# Patient Record
Sex: FEMALE | Race: WHITE | NOT HISPANIC OR LATINO | Employment: OTHER | URBAN - METROPOLITAN AREA
[De-identification: names, ages, dates, MRNs, and addresses within clinical notes are randomized per-mention and may not be internally consistent; named-entity substitution may affect disease eponyms.]

---

## 2023-02-24 LAB
NATRIURETIC PEPTIDE B (PG/ML) IN SER/PLAS: 140 PG/ML (ref 0–99)
THYROTROPIN (MIU/L) IN SER/PLAS BY DETECTION LIMIT <= 0.05 MIU/L: 1.47 MIU/L (ref 0.44–3.98)

## 2023-04-01 DIAGNOSIS — D64.9 ANEMIA, UNSPECIFIED: ICD-10-CM

## 2023-04-01 DIAGNOSIS — E11.49 TYPE 2 DIABETES MELLITUS WITH OTHER DIABETIC NEUROLOGICAL COMPLICATION (MULTI): ICD-10-CM

## 2023-04-01 DIAGNOSIS — I63.9 CEREBRAL INFARCTION, UNSPECIFIED (MULTI): ICD-10-CM

## 2023-04-01 DIAGNOSIS — E03.9 HYPOTHYROIDISM, UNSPECIFIED: ICD-10-CM

## 2023-04-01 DIAGNOSIS — I10 ESSENTIAL (PRIMARY) HYPERTENSION: ICD-10-CM

## 2023-04-01 DIAGNOSIS — E78.5 HYPERLIPIDEMIA, UNSPECIFIED: ICD-10-CM

## 2023-04-04 RX ORDER — LOSARTAN POTASSIUM 25 MG/1
TABLET ORAL
Qty: 30 TABLET | Refills: 1 | Status: SHIPPED | OUTPATIENT
Start: 2023-04-04 | End: 2023-04-24

## 2023-04-04 RX ORDER — ATORVASTATIN CALCIUM 80 MG/1
TABLET, FILM COATED ORAL
Qty: 90 TABLET | Refills: 0 | Status: SHIPPED | OUTPATIENT
Start: 2023-04-04 | End: 2024-01-09 | Stop reason: SDUPTHER

## 2023-04-04 RX ORDER — LEVOTHYROXINE SODIUM 150 UG/1
TABLET ORAL
Qty: 30 TABLET | Refills: 1 | Status: SHIPPED | OUTPATIENT
Start: 2023-04-04 | End: 2023-05-01

## 2023-04-04 RX ORDER — LEVOTHYROXINE SODIUM 137 UG/1
TABLET ORAL
Qty: 30 TABLET | Refills: 1 | OUTPATIENT
Start: 2023-04-04

## 2023-04-04 RX ORDER — GLIPIZIDE 5 MG/1
TABLET ORAL
Qty: 60 TABLET | Refills: 1 | Status: SHIPPED | OUTPATIENT
Start: 2023-04-04 | End: 2023-12-22 | Stop reason: SDUPTHER

## 2023-04-04 RX ORDER — FERROUS SULFATE TAB 325 MG (65 MG ELEMENTAL FE) 325 (65 FE) MG
TAB ORAL
Qty: 90 TABLET | Refills: 0 | Status: SHIPPED | OUTPATIENT
Start: 2023-04-04 | End: 2023-07-27 | Stop reason: SDUPTHER

## 2023-04-04 RX ORDER — CLOPIDOGREL BISULFATE 75 MG/1
TABLET ORAL
Qty: 90 TABLET | Refills: 0 | Status: SHIPPED | OUTPATIENT
Start: 2023-04-04 | End: 2023-04-24

## 2023-04-14 ENCOUNTER — PATIENT OUTREACH (OUTPATIENT)
Dept: CARE COORDINATION | Facility: CLINIC | Age: 88
End: 2023-04-14
Payer: MEDICARE

## 2023-04-21 ENCOUNTER — APPOINTMENT (OUTPATIENT)
Dept: PRIMARY CARE | Facility: CLINIC | Age: 88
End: 2023-04-21
Payer: MEDICARE

## 2023-04-26 ENCOUNTER — OFFICE VISIT (OUTPATIENT)
Dept: PRIMARY CARE | Facility: CLINIC | Age: 88
End: 2023-04-26
Payer: MEDICARE

## 2023-04-26 VITALS
BODY MASS INDEX: 23.08 KG/M2 | HEART RATE: 82 BPM | DIASTOLIC BLOOD PRESSURE: 71 MMHG | OXYGEN SATURATION: 96 % | WEIGHT: 135.2 LBS | SYSTOLIC BLOOD PRESSURE: 156 MMHG | HEIGHT: 64 IN

## 2023-04-26 DIAGNOSIS — R21 RASH: ICD-10-CM

## 2023-04-26 DIAGNOSIS — N18.32 STAGE 3B CHRONIC KIDNEY DISEASE (MULTI): ICD-10-CM

## 2023-04-26 DIAGNOSIS — R20.3 INCREASED SKIN SENSATION: ICD-10-CM

## 2023-04-26 DIAGNOSIS — R93.1 ABNORMAL ECHOCARDIOGRAM: ICD-10-CM

## 2023-04-26 DIAGNOSIS — E78.5 DYSLIPIDEMIA: ICD-10-CM

## 2023-04-26 DIAGNOSIS — G44.89 OTHER HEADACHE SYNDROME: ICD-10-CM

## 2023-04-26 DIAGNOSIS — D50.8 OTHER IRON DEFICIENCY ANEMIA: ICD-10-CM

## 2023-04-26 DIAGNOSIS — F32.0 CURRENT MILD EPISODE OF MAJOR DEPRESSIVE DISORDER WITHOUT PRIOR EPISODE (CMS-HCC): ICD-10-CM

## 2023-04-26 DIAGNOSIS — M31.6 TEMPORAL ARTERITIS (MULTI): ICD-10-CM

## 2023-04-26 DIAGNOSIS — I50.89 OTHER HEART FAILURE (MULTI): ICD-10-CM

## 2023-04-26 DIAGNOSIS — R13.12 OROPHARYNGEAL DYSPHAGIA: ICD-10-CM

## 2023-04-26 DIAGNOSIS — J98.4 PNEUMONITIS: Primary | ICD-10-CM

## 2023-04-26 DIAGNOSIS — E89.0 POSTABLATIVE HYPOTHYROIDISM: ICD-10-CM

## 2023-04-26 DIAGNOSIS — I10 BENIGN ESSENTIAL HTN: ICD-10-CM

## 2023-04-26 PROBLEM — R41.82 ACUTE ALTERATION IN MENTAL STATUS: Status: ACTIVE | Noted: 2023-04-26

## 2023-04-26 PROBLEM — H91.90 HARD OF HEARING: Status: ACTIVE | Noted: 2023-04-26

## 2023-04-26 PROBLEM — N39.0 ACUTE LOWER UTI: Status: ACTIVE | Noted: 2023-04-26

## 2023-04-26 PROBLEM — G89.29 CHRONIC PAIN OF BOTH KNEES: Status: ACTIVE | Noted: 2023-04-26

## 2023-04-26 PROBLEM — E16.2 HYPOGLYCEMIA: Status: ACTIVE | Noted: 2023-04-26

## 2023-04-26 PROBLEM — L30.9 ECZEMA: Status: ACTIVE | Noted: 2023-04-26

## 2023-04-26 PROBLEM — I50.9 HEART FAILURE (MULTI): Status: ACTIVE | Noted: 2023-04-26

## 2023-04-26 PROBLEM — M10.9 GOUT FLARE: Status: ACTIVE | Noted: 2023-04-26

## 2023-04-26 PROBLEM — M25.571 CHRONIC PAIN OF RIGHT ANKLE: Status: ACTIVE | Noted: 2023-04-26

## 2023-04-26 PROBLEM — M25.562 CHRONIC PAIN OF BOTH KNEES: Status: ACTIVE | Noted: 2023-04-26

## 2023-04-26 PROBLEM — E78.1 HYPERTRIGLYCERIDEMIA: Status: ACTIVE | Noted: 2023-04-26

## 2023-04-26 PROBLEM — R44.3 HALLUCINATION: Status: ACTIVE | Noted: 2023-04-26

## 2023-04-26 PROBLEM — I63.9 CVA (CEREBRAL VASCULAR ACCIDENT) (MULTI): Status: ACTIVE | Noted: 2023-04-26

## 2023-04-26 PROBLEM — R53.82 CHRONIC FATIGUE: Status: ACTIVE | Noted: 2023-04-26

## 2023-04-26 PROBLEM — S89.92XA: Status: ACTIVE | Noted: 2023-04-26

## 2023-04-26 PROBLEM — R29.6 FREQUENT FALLS: Status: ACTIVE | Noted: 2023-04-26

## 2023-04-26 PROBLEM — M19.90 ARTHRITIS: Status: ACTIVE | Noted: 2023-04-26

## 2023-04-26 PROBLEM — D64.9 ANEMIA: Status: ACTIVE | Noted: 2023-04-26

## 2023-04-26 PROBLEM — M25.561 CHRONIC PAIN OF BOTH KNEES: Status: ACTIVE | Noted: 2023-04-26

## 2023-04-26 PROBLEM — G89.29 CHRONIC PAIN OF RIGHT ANKLE: Status: ACTIVE | Noted: 2023-04-26

## 2023-04-26 PROBLEM — R05.9 COUGH: Status: ACTIVE | Noted: 2023-04-26

## 2023-04-26 PROBLEM — I25.10 ATHEROSCLEROSIS OF NATIVE CORONARY ARTERY OF NATIVE HEART WITHOUT ANGINA PECTORIS: Status: ACTIVE | Noted: 2023-04-26

## 2023-04-26 PROBLEM — E03.9 HYPOTHYROIDISM: Status: ACTIVE | Noted: 2023-04-26

## 2023-04-26 PROCEDURE — 3078F DIAST BP <80 MM HG: CPT | Performed by: FAMILY MEDICINE

## 2023-04-26 PROCEDURE — 3077F SYST BP >= 140 MM HG: CPT | Performed by: FAMILY MEDICINE

## 2023-04-26 PROCEDURE — 1160F RVW MEDS BY RX/DR IN RCRD: CPT | Performed by: FAMILY MEDICINE

## 2023-04-26 PROCEDURE — 1036F TOBACCO NON-USER: CPT | Performed by: FAMILY MEDICINE

## 2023-04-26 PROCEDURE — 1159F MED LIST DOCD IN RCRD: CPT | Performed by: FAMILY MEDICINE

## 2023-04-26 PROCEDURE — 99214 OFFICE O/P EST MOD 30 MIN: CPT | Performed by: FAMILY MEDICINE

## 2023-04-26 RX ORDER — FUROSEMIDE 20 MG/1
20 TABLET ORAL DAILY
COMMUNITY
End: 2023-06-02 | Stop reason: ALTCHOICE

## 2023-04-26 RX ORDER — TRIAMCINOLONE ACETONIDE 1 MG/G
OINTMENT TOPICAL
Qty: 60 G | Refills: 0 | Status: SHIPPED | OUTPATIENT
Start: 2023-04-26 | End: 2024-02-12 | Stop reason: WASHOUT

## 2023-04-26 RX ORDER — INSULIN DEGLUDEC 100 U/ML
16 INJECTION, SOLUTION SUBCUTANEOUS EVERY MORNING
COMMUNITY
Start: 2017-12-04 | End: 2023-11-27 | Stop reason: SDUPTHER

## 2023-04-26 RX ORDER — ERGOCALCIFEROL 1.25 MG/1
1 CAPSULE ORAL
COMMUNITY
End: 2023-06-02 | Stop reason: ALTCHOICE

## 2023-04-26 RX ORDER — MAGNESIUM HYDROXIDE 400 MG/5ML
1 SUSPENSION, ORAL (FINAL DOSE FORM) ORAL DAILY
COMMUNITY
End: 2023-06-02 | Stop reason: ALTCHOICE

## 2023-04-26 RX ORDER — LANOLIN ALCOHOL/MO/W.PET/CERES
1 CREAM (GRAM) TOPICAL DAILY
COMMUNITY
Start: 2021-03-07 | End: 2023-10-27 | Stop reason: SDUPTHER

## 2023-04-26 RX ORDER — METOPROLOL TARTRATE 25 MG/1
1 TABLET, FILM COATED ORAL 2 TIMES DAILY
COMMUNITY
Start: 2019-04-04 | End: 2023-05-19

## 2023-04-26 ASSESSMENT — PAIN SCALES - GENERAL: PAINLEVEL: 0-NO PAIN

## 2023-04-26 ASSESSMENT — ENCOUNTER SYMPTOMS
LOSS OF SENSATION IN FEET: 0
DEPRESSION: 0
OCCASIONAL FEELINGS OF UNSTEADINESS: 0

## 2023-04-26 NOTE — PROGRESS NOTES
Ashlee Douglas A Saturday is a 87 y.o. female who presents for No chief complaint on file..          HPI  The patient is a 87 year-old female presenting to the clinic after being discharged from a Rehabilitation facility.  Recently she was still in the hospital.  Was discharged home from rehabilitation facility  Blood sugars are running normal.  Recent blood sugar was 118.  Complaining hot sensation in the scalp on and off and happening for more than 6 months.  Also feels like she is having headache.  Denies tingling numbness.  Denies tingling weakness.  Treated in the hospital for pneumonitis.  History of congestive heart failure.  Denies PND.  Denies orthopnea.  Educated on chronic kidney disease.  We will continue monitor.  Has been having trouble swallowing on and off discussed echocardiogram results.  Refer to cardiology.  Educated on hypertension and low-salt and exercise.  Educated on hypothyroidism.  Educated on anemia.  Denies blood in the stool.  History of depression.  Denies depression.  Denies anxiety.  Denies suicidal.  Denies homicidal.  Educated on dyslipidemia and diet and exercise.    Was treated for temporal arteritis in the past.  Patient stated that she had been to the neurologist.              Review of Systems  Review of systems    General.  Denies fever.  Denies chills.    HEENT detail as above     respiratory.  Denies cough.  Denies shortness of breath.    Cardiovascular.  Denies chest pain.  Denies heart palpitations.  Denies shortness of breath.    Gastrointestinal.  Denies nausea vomiting diarrhea.  Denies abdominal pain.    Genitourinary denies burning urination.  Denies frequent urination.  Denies flank pain.  Denies blood in the urine.  Denies abnormal vaginal discharge.    Neurology.  Dictated as above  Musculoskeletal.  Denies body aches.  Denies joint pains.  Denies muscle aches.  Denies muscle weakness    Endocrinology.  Denies cold intolerance.  Denies hot  "intolerance.    Psychiatric.  Denies depression.  Denies anxiety.  Denies suicidal.  Denies homicidal.  Skin.  Dictated as above.  Objective       4/26/2023 11:19 AM   /71   BP Location Left arm   Patient Position Sitting   BP Cuff Size Adult   Pulse 82   SpO2 96 %   Weight 61.3 kg (135 lb 3.2 oz)   Height 1.626 m (5' 4\")   Pain Score 0-No pain    Other Vitals   BMI 23.21 kg/m2   BSA 1.66 m2   Menstrual status Postmenopausal   OB/Gyn status reviewed 4/26/2023   Tobacco   Smoking status Never   Counseling given? No   Smokeless status Never   Reviewed 4/26/2023          Physical Exam  General.  Not in distress.  HEENT normocephalic anicteric sclerae.  Neck soft supple no thyromegaly.  No carotid bruit.  Lungs are clear.  Heart regular.  Abdomen soft nontender nondistended bowel sounds are positive.  Extremities no clubbing cyanosis or edema.  Psychiatric.  Has good eye contact.  No crying spells noted.  Speech was normal.  Denies depression.  Denies suicidal.  Denies homicidal.  Foot exam.  Bilateral foot exam.  No rashes noted.  No lesions noted.  No ulcers noted.  No onychomycosis noted.  Sensation was intact with a monofilament.  Bilateral posterior tibial and dorsalis pedis arteries are palpable  Skin exam.  Skin rash on upper and lower extremities and on the trunk consistent with a dermatitis    Assessment/Plan   1.  Pneumonitis.  Lungs are clear.  Was recently treated in the hospital.  Denies shortness of breath.    2.  Chronic kidney disease.  Dictated as above    3.  Temporal arteritis.  Patient stated she had been to the specialist and she was treated by specialist.    Oropharyngeal dysphagia.  Recommended to the ENT.    5.  Abnormal echocardiogram.  Dictated as above    6.  Hypertension.  Educated on low-salt and exercise.  Continue current management.    7.  Hypothyroidism.  Educated on hypothyroidism.    8.  Iron deficiency anemia.  Dictated as above.    9.  Depression.  Denies anxiety.  Denies " suicidal.  Denies homicidal.    10.  Skin rash.  Educated on skin rash care.    11.  Headache.  Neurological exam was normal.                          4.      3.  Heart failure.  Dictated as above.    4.          Problem List Items Addressed This Visit    None  Scribe Attestation  By signing my name below, IGeovanna Scribe   attest that this documentation has been prepared under the direction and in the presence of Gamaliel Wallace MD.

## 2023-05-19 DIAGNOSIS — I25.10 ATHEROSCLEROTIC HEART DISEASE OF NATIVE CORONARY ARTERY WITHOUT ANGINA PECTORIS: ICD-10-CM

## 2023-05-19 RX ORDER — METOPROLOL TARTRATE 25 MG/1
TABLET, FILM COATED ORAL
Qty: 60 TABLET | Refills: 1 | Status: SHIPPED | OUTPATIENT
Start: 2023-05-19 | End: 2023-07-27 | Stop reason: SDUPTHER

## 2023-06-02 ENCOUNTER — OFFICE VISIT (OUTPATIENT)
Dept: PRIMARY CARE | Facility: CLINIC | Age: 88
End: 2023-06-02
Payer: MEDICARE

## 2023-06-02 VITALS
HEIGHT: 64 IN | HEART RATE: 60 BPM | WEIGHT: 131.5 LBS | OXYGEN SATURATION: 99 % | SYSTOLIC BLOOD PRESSURE: 153 MMHG | BODY MASS INDEX: 22.45 KG/M2 | DIASTOLIC BLOOD PRESSURE: 70 MMHG

## 2023-06-02 DIAGNOSIS — I63.89 CEREBROVASCULAR ACCIDENT (CVA) DUE TO OTHER MECHANISM (MULTI): ICD-10-CM

## 2023-06-02 DIAGNOSIS — N18.32 STAGE 3B CHRONIC KIDNEY DISEASE (MULTI): ICD-10-CM

## 2023-06-02 DIAGNOSIS — E87.6 HYPOKALEMIA: ICD-10-CM

## 2023-06-02 DIAGNOSIS — R60.0 LOCALIZED EDEMA: ICD-10-CM

## 2023-06-02 DIAGNOSIS — Z79.4 TYPE 2 DIABETES MELLITUS WITH OTHER SPECIFIED COMPLICATION, WITH LONG-TERM CURRENT USE OF INSULIN (MULTI): ICD-10-CM

## 2023-06-02 DIAGNOSIS — E11.69 TYPE 2 DIABETES MELLITUS WITH OTHER SPECIFIED COMPLICATION, WITH LONG-TERM CURRENT USE OF INSULIN (MULTI): ICD-10-CM

## 2023-06-02 DIAGNOSIS — E78.5 DYSLIPIDEMIA: ICD-10-CM

## 2023-06-02 DIAGNOSIS — E55.9 VITAMIN D DEFICIENCY: ICD-10-CM

## 2023-06-02 DIAGNOSIS — G44.89 OTHER HEADACHE SYNDROME: Primary | ICD-10-CM

## 2023-06-02 PROCEDURE — 1159F MED LIST DOCD IN RCRD: CPT | Performed by: FAMILY MEDICINE

## 2023-06-02 PROCEDURE — 99214 OFFICE O/P EST MOD 30 MIN: CPT | Performed by: FAMILY MEDICINE

## 2023-06-02 PROCEDURE — 3077F SYST BP >= 140 MM HG: CPT | Performed by: FAMILY MEDICINE

## 2023-06-02 PROCEDURE — 1036F TOBACCO NON-USER: CPT | Performed by: FAMILY MEDICINE

## 2023-06-02 PROCEDURE — 1160F RVW MEDS BY RX/DR IN RCRD: CPT | Performed by: FAMILY MEDICINE

## 2023-06-02 PROCEDURE — 3078F DIAST BP <80 MM HG: CPT | Performed by: FAMILY MEDICINE

## 2023-06-02 RX ORDER — ERGOCALCIFEROL 1.25 MG/1
1 CAPSULE ORAL
Qty: 12 CAPSULE | Refills: 0 | Status: CANCELLED | OUTPATIENT
Start: 2023-06-02

## 2023-06-02 RX ORDER — POTASSIUM CHLORIDE 20 MEQ/1
20 TABLET, EXTENDED RELEASE ORAL DAILY
Qty: 30 TABLET | Refills: 0 | Status: SHIPPED | OUTPATIENT
Start: 2023-06-02 | End: 2023-07-02

## 2023-06-02 RX ORDER — FUROSEMIDE 20 MG/1
20 TABLET ORAL DAILY
Qty: 90 TABLET | Refills: 1 | Status: CANCELLED | OUTPATIENT
Start: 2023-06-02

## 2023-06-02 ASSESSMENT — PAIN SCALES - GENERAL: PAINLEVEL: 0-NO PAIN

## 2023-06-02 NOTE — PROGRESS NOTES
Ashlee Douglas A Saturday is a 87 y.o. female who presents for Follow-up (Follow up meds/lab results).  Today she is accompanied by her son.      HPI  The patient is a 87 year-old female presenting to the clinic for follow up        Discussed diabetes and management.  Referral placed with Neurology 6/2/2023.  Take potassium as directed  Follow up in office.   Comes with her nephew.  I have reviewed MRI results.  IMPRESSION:  No acute intracranial abnormality was identified with nonspecific  white matter abnormalities likely due to chronic small vessel  ischemic changes given the age of the patient, multiple foci of old  ischemia most prominently involving the left cerebellar hemisphere  and thalami. Few nonspecific foci of cortical increased  susceptibility some of which may be due to remote injury, scattered  cavernomas, or in the appropriate clinical context minimal infectious  inflammatory change for instance.    Still experiencing headache.  Denies tingling numbness.  Denies weakness.  Recommended neurology consultation.  Advised to go to the emergency room if headache continues or gets worse.  Educated on edema.  Denies PND.  Denies orthopnea.  Educated on vitamin D deficiency.  Discussed chronic kidney disease.  Does not wish to go see the nephrologist but does not wish to go for 24-hour creatinine clearance.  We will continue monitor.  Educated on hypokalemia.  Has history of CVA.  Had been to the neurologist in the past.  Educated on dyslipidemia and diet and exercise but educated on type 2 diabetes and diet exercise.              Review of Systems  Review of systems:    General:  Denies fever.  Denies chills.    HEENT: Denies nasal congestion.  Denies sinus pressure.    Respiratory:  Denies cough.  Denies shortness of breath.    Cardiovascular:  Denies chest pain.  Denies heart palpitations.  Denies shortness of breath.    Gastrointestinal:  Denies nausea vomiting diarrhea.  Denies abdominal  "pain.    Genitourinary: Denies burning urination.  Denies frequent urination.  Denies flank pain.  Denies blood in the urine.  Denies abnormal vaginal discharge.    Neurology:  Denies tingling numbness but denies weakness.  Denies headache.  Denies blurred vision.    Musculoskeletal:  Denies body aches.  Denies joint pains.  Denies muscle aches.  Denies muscle weakness    Endocrinology:  Dictated as above.    Psychiatric:  Denies depression.  Denies anxiety.  Denies suicidal.  Denies homicidal.      Objective   /70 (BP Location: Left arm, Patient Position: Sitting, BP Cuff Size: Adult)   Pulse 60   Ht 1.626 m (5' 4\")   Wt 59.6 kg (131 lb 8 oz)   SpO2 99%   BMI 22.57 kg/m²           Physical Exam  General.  Not in distress.  HEENT normocephalic anicteric sclerae.  Neck soft supple no thyromegaly.  No carotid bruit.  Lungs are clear.  Heart regular.  Abdomen soft nontender nondistended bowel sounds are positive.  Extremities no clubbing cyanosis or edema.  Psychiatric.  Has good eye contact.  No crying spells noted.  Speech was normal.  Denies depression.  Denies suicidal.  Denies homicidal.  Neurological exam was normal.  Foot exam.  Bilateral foot exam.  No rashes noted.  No lesions noted.  No ulcers noted.  No onychomycosis noted.  Sensation was intact with a monofilament.  Bilateral posterior tibial and dorsalis pedis arteries are palpable    Assessment/Plan     1.  Headache.  Neurological exam was normal.  Reviewed MRI results.  Keep appointment with the neurologist.  2.  Edema.  Denies PND.  Denies orthopnea.  Continue current management.    3.  Vitamin D deficiency.  Educated on vitamin D deficiency we will continue monitor.    4.  Chronic kidney disease.  Dictated as above.    5.  Hypokalemia.  Educated on hypokalemia.  Advised to increase dietary potassium intake.    6.  CVA.  Dictated as above.    7.  Dyslipidemia.  Educated on diet exercise.  We will continue monitor    8.  Type 2 diabetes.  " Educated on diet and exercise.  Advised to check blood sugars twice daily.  Recommended eye exam once a year.  Educated on diabetic foot care.  We will continue monitor.                                  Problem List Items Addressed This Visit          Nervous    CVA (cerebral vascular accident) (CMS/[facility])       Genitourinary    Stage 3b chronic kidney disease       Other    Dyslipidemia     Other Visit Diagnoses       Other headache syndrome    -  Primary    Relevant Orders    Referral to Neurology    Vitamin D deficiency        Localized edema        Hypokalemia        Relevant Medications    potassium chloride CR (Klor-Con M20) 20 mEq ER tablet    Other Relevant Orders    Comprehensive metabolic panel    Type 2 diabetes mellitus with other specified complication, with long-term current use of insulin (CMS/[facility])            Scribe Attestation  By signing my name below, IGeovanna Scribe   attest that this documentation has been prepared under the direction and in the presence of Gamaliel Wallace MD.

## 2023-07-27 DIAGNOSIS — D64.9 ANEMIA, UNSPECIFIED: ICD-10-CM

## 2023-07-27 DIAGNOSIS — E03.9 HYPOTHYROIDISM, UNSPECIFIED: ICD-10-CM

## 2023-07-27 DIAGNOSIS — E87.6 HYPOKALEMIA: ICD-10-CM

## 2023-07-27 DIAGNOSIS — I25.10 ATHEROSCLEROTIC HEART DISEASE OF NATIVE CORONARY ARTERY WITHOUT ANGINA PECTORIS: ICD-10-CM

## 2023-07-27 RX ORDER — FERROUS SULFATE 325(65) MG
1 TABLET ORAL DAILY
Qty: 90 TABLET | Refills: 0 | Status: SHIPPED | OUTPATIENT
Start: 2023-07-27 | End: 2024-02-12 | Stop reason: SDUPTHER

## 2023-07-27 RX ORDER — METOPROLOL TARTRATE 25 MG/1
25 TABLET, FILM COATED ORAL 2 TIMES DAILY
Qty: 180 TABLET | Refills: 0 | Status: SHIPPED | OUTPATIENT
Start: 2023-07-27 | End: 2024-01-09 | Stop reason: SDUPTHER

## 2023-07-27 RX ORDER — POTASSIUM CHLORIDE 1500 MG/1
20 TABLET, EXTENDED RELEASE ORAL DAILY
Qty: 90 TABLET | Refills: 0 | Status: SHIPPED | OUTPATIENT
Start: 2023-07-27 | End: 2023-12-11 | Stop reason: SDUPTHER

## 2023-07-27 RX ORDER — LEVOTHYROXINE SODIUM 150 UG/1
150 TABLET ORAL DAILY
Qty: 90 TABLET | Refills: 0 | Status: SHIPPED | OUTPATIENT
Start: 2023-07-27 | End: 2023-12-07 | Stop reason: SDUPTHER

## 2023-07-27 NOTE — TELEPHONE ENCOUNTER
Apt with you on 10-9, pot was 3.3 on 4-12, TSH was 0.05 on 4-6, RBC was 3.17, hemo 9.0 and hemat 28.9 on 4-5, requesting the potassium be refilled it looks like this pt was receiving this from dr. GRACE johnson of hypokalemia, pharm confirms pt has been receiving, all changes have been made.

## 2023-08-17 ENCOUNTER — NURSING HOME VISIT (OUTPATIENT)
Dept: POST ACUTE CARE | Facility: EXTERNAL LOCATION | Age: 88
End: 2023-08-17
Payer: MEDICARE

## 2023-08-17 DIAGNOSIS — E03.9 HYPOTHYROIDISM, UNSPECIFIED TYPE: ICD-10-CM

## 2023-08-17 DIAGNOSIS — E87.6 HYPOKALEMIA: ICD-10-CM

## 2023-08-17 DIAGNOSIS — I21.4 NSTEMI (NON-ST ELEVATION MYOCARDIAL INFARCTION) (MULTI): ICD-10-CM

## 2023-08-17 DIAGNOSIS — I63.9 CEREBROVASCULAR ACCIDENT (CVA), UNSPECIFIED MECHANISM (MULTI): ICD-10-CM

## 2023-08-17 DIAGNOSIS — E78.5 HYPERLIPIDEMIA, UNSPECIFIED HYPERLIPIDEMIA TYPE: ICD-10-CM

## 2023-08-17 DIAGNOSIS — D50.8 OTHER IRON DEFICIENCY ANEMIA: ICD-10-CM

## 2023-08-17 DIAGNOSIS — E11.9 TYPE 2 DIABETES MELLITUS WITHOUT COMPLICATION, UNSPECIFIED WHETHER LONG TERM INSULIN USE (MULTI): ICD-10-CM

## 2023-08-17 DIAGNOSIS — R53.81 PHYSICAL DECONDITIONING: ICD-10-CM

## 2023-08-17 DIAGNOSIS — I10 BENIGN ESSENTIAL HTN: Primary | ICD-10-CM

## 2023-08-17 DIAGNOSIS — M15.9 OSTEOARTHRITIS OF MULTIPLE JOINTS, UNSPECIFIED OSTEOARTHRITIS TYPE: ICD-10-CM

## 2023-08-17 DIAGNOSIS — E55.9 VITAMIN D DEFICIENCY: ICD-10-CM

## 2023-08-17 DIAGNOSIS — N18.32 STAGE 3B CHRONIC KIDNEY DISEASE (MULTI): ICD-10-CM

## 2023-08-17 PROCEDURE — 99309 SBSQ NF CARE MODERATE MDM 30: CPT | Performed by: NURSE PRACTITIONER

## 2023-08-17 NOTE — LETTER
Patient: Stella Saturday  : 1935    Encounter Date: 2023    Patient ID: Stella WILKES Saturday is a 87 y.o. female who is acute skilled care being seen and evaluated for multiple medical problems.  [id number]   1935    /74   Pulse 80   Temp 36.1 °C (97 °F)   Resp 17   Wt 62.1 kg (137 lb)   SpO2 96%   BMI 23.52 kg/m²     Assessment/Plan  Problem List Items Addressed This Visit       Anemia     Ferrous sulfate 325 mg by mouth daily   B12 1000 mcg by mouth daily          Benign essential HTN - Primary     Losartan 25 mg by mouth daily   Metoprolol tart 25 mg by mouth BID         CVA (cerebral vascular accident) (CMS/[facility])     RUE weakness  MRI brain- Acute left MCA territory infarcts          Hyperlipemia     Atorvastatin 80 mg  by mouth daily          Hypothyroidism     Levothyroxine 150 mcg by mouth daily   TSH x 1            Stage 3b chronic kidney disease (CMS/[facility])     Avoid NSAIDS   Monitor renal panel  2023 BUN/Creat 18/1.4         Hypokalemia     Potassium 20 meq by mouth daily   2023 Potassium- 4.2         NSTEMI (non-ST elevation myocardial infarction) (CMS/[facility])     Denies CP  C 2023 Stenosis proximal nondominant left Cx, lesion stable   Beta Blocker, Statin, and  Plavix 75 mg by mouth daily                Osteoarthritis of multiple joints     Acetaminophen 650 mg by mouth every 6 hrs as needed          Physical deconditioning     PT/OT         Vitamin D deficiency     D2 50,000 units by mouth every 2023: Vitamin D- 31.72         Type 2 diabetes mellitus without complication (CMS/[facility])     Glipizide 5 mg by mouth BID  Degludec 16 units subcutaneous daily   Glucose checks BID               HPI: Client admitted at [facility] from 8/3/2023- 8/10/2023 skilled, diagnosis Weakness, CVA, NSTEMI, and Dementia. On 2023 client presented to [facility] ED with CP and SOB. Cardiology consulted- acute coronary syndrome. [facility]23. RUE weakness- MRI brain  showed acute left MCA CVA. Client is [place]. Client denies HA, dizziness, or lightheadedness. Denies CP, SOB, Cough, or increased edema. RA. Denies abdominal pain, N/V, diarrhea, or constipation. Denies dysuria. States appetite is decreased. No current C/O pain.      Review of Systems: Limited, poor historian     Physical Exam  Constitutional:       Comments: Resting in bed    HENT:      Mouth/Throat:      Mouth: Mucous membranes are moist.   Cardiovascular:      Rate and Rhythm: Normal rate.   Pulmonary:      Effort: Pulmonary effort is normal.      Breath sounds: Normal breath sounds.      Comments: RA  Abdominal:      General: Bowel sounds are normal.   Genitourinary:     Comments: Denies dysuria   Musculoskeletal:      Cervical back: Neck supple.      Comments: CVA- RUE weakness  PT/OT    Skin:     General: Skin is warm and dry.   Neurological:      Mental Status: She is alert. Mental status is at baseline.   Psychiatric:         Mood and Affect: Mood normal.      Comments: Conversational                    Electronically Signed By: ANUSHKA Angelo   8/19/23  8:36 PM

## 2023-08-19 VITALS
RESPIRATION RATE: 17 BRPM | TEMPERATURE: 97 F | WEIGHT: 137 LBS | SYSTOLIC BLOOD PRESSURE: 136 MMHG | DIASTOLIC BLOOD PRESSURE: 74 MMHG | HEART RATE: 80 BPM | OXYGEN SATURATION: 96 % | BODY MASS INDEX: 23.52 KG/M2

## 2023-08-19 PROBLEM — R53.82 CHRONIC FATIGUE: Status: RESOLVED | Noted: 2023-04-26 | Resolved: 2023-08-19

## 2023-08-19 PROBLEM — G47.30 SLEEP APNEA: Status: ACTIVE | Noted: 2020-09-21

## 2023-08-19 PROBLEM — R20.0 NUMBNESS: Status: ACTIVE | Noted: 2023-08-19

## 2023-08-19 PROBLEM — I25.118 CORONARY ARTERY DISEASE OF NATIVE ARTERY OF NATIVE HEART WITH STABLE ANGINA PECTORIS (CMS-HCC): Status: ACTIVE | Noted: 2023-08-01

## 2023-08-19 PROBLEM — R29.6 FREQUENT FALLS: Status: RESOLVED | Noted: 2023-04-26 | Resolved: 2023-08-19

## 2023-08-19 PROBLEM — R05.9 COUGH: Status: RESOLVED | Noted: 2023-04-26 | Resolved: 2023-08-19

## 2023-08-19 PROBLEM — R53.81 PHYSICAL DECONDITIONING: Status: ACTIVE | Noted: 2023-08-19

## 2023-08-19 PROBLEM — M15.9 OSTEOARTHRITIS OF MULTIPLE JOINTS: Status: ACTIVE | Noted: 2023-08-19

## 2023-08-19 PROBLEM — R42 DIZZINESS: Status: RESOLVED | Noted: 2023-08-19 | Resolved: 2023-08-19

## 2023-08-19 PROBLEM — N39.0 ACUTE LOWER UTI: Status: RESOLVED | Noted: 2023-04-26 | Resolved: 2023-08-19

## 2023-08-19 PROBLEM — M81.0 OSTEOPOROSIS: Status: ACTIVE | Noted: 2023-08-19

## 2023-08-19 PROBLEM — I63.512 ACUTE ISCHEMIC LEFT MCA STROKE (MULTI): Status: ACTIVE | Noted: 2023-08-01

## 2023-08-19 PROBLEM — I21.4 NSTEMI (NON-ST ELEVATION MYOCARDIAL INFARCTION) (MULTI): Status: ACTIVE | Noted: 2023-07-31

## 2023-08-19 PROBLEM — G47.00 INSOMNIA: Status: ACTIVE | Noted: 2023-08-19

## 2023-08-19 PROBLEM — H47.012: Status: ACTIVE | Noted: 2018-07-20

## 2023-08-19 PROBLEM — R42 DIZZINESS: Status: ACTIVE | Noted: 2023-08-19

## 2023-08-19 PROBLEM — R44.3 HALLUCINATION: Status: RESOLVED | Noted: 2023-04-26 | Resolved: 2023-08-19

## 2023-08-19 PROBLEM — K58.0 IRRITABLE BOWEL SYNDROME WITH DIARRHEA: Status: ACTIVE | Noted: 2020-09-21

## 2023-08-19 PROBLEM — G45.9 TIA (TRANSIENT ISCHEMIC ATTACK): Status: ACTIVE | Noted: 2021-03-07

## 2023-08-19 PROBLEM — L84 PRE-ULCERATIVE CORN OR CALLOUS: Status: ACTIVE | Noted: 2023-08-19

## 2023-08-19 PROBLEM — E55.9 VITAMIN D DEFICIENCY: Status: ACTIVE | Noted: 2023-08-19

## 2023-08-19 PROBLEM — E78.5 DYSLIPIDEMIA: Status: RESOLVED | Noted: 2023-04-26 | Resolved: 2023-08-19

## 2023-08-19 PROBLEM — E11.9 TYPE 2 DIABETES MELLITUS WITHOUT COMPLICATION (MULTI): Status: ACTIVE | Noted: 2023-08-19

## 2023-08-19 PROBLEM — H53.9 VISION CHANGES: Status: ACTIVE | Noted: 2023-08-19

## 2023-08-19 PROBLEM — D50.9 IRON DEFICIENCY ANEMIA: Status: RESOLVED | Noted: 2020-09-21 | Resolved: 2023-08-19

## 2023-08-19 PROBLEM — D50.9 IRON DEFICIENCY ANEMIA: Status: ACTIVE | Noted: 2020-09-21

## 2023-08-19 PROBLEM — E16.2 HYPOGLYCEMIA: Status: RESOLVED | Noted: 2023-04-26 | Resolved: 2023-08-19

## 2023-08-19 PROBLEM — F41.9 ANXIETY: Status: ACTIVE | Noted: 2020-09-21

## 2023-08-20 NOTE — PROGRESS NOTES
Patient ID: Stella WILKES Saturday is a 87 y.o. female who is acute skilled care being seen and evaluated for multiple medical problems.  [id number]   1935    /74   Pulse 80   Temp 36.1 °C (97 °F)   Resp 17   Wt 62.1 kg (137 lb)   SpO2 96%   BMI 23.52 kg/m²     Assessment/Plan  Problem List Items Addressed This Visit       Anemia     Ferrous sulfate 325 mg by mouth daily   B12 1000 mcg by mouth daily          Benign essential HTN - Primary     Losartan 25 mg by mouth daily   Metoprolol tart 25 mg by mouth BID         CVA (cerebral vascular accident) (CMS/[facility])     RUE weakness  MRI brain- Acute left MCA territory infarcts          Hyperlipemia     Atorvastatin 80 mg  by mouth daily          Hypothyroidism     Levothyroxine 150 mcg by mouth daily   TSH x 1            Stage 3b chronic kidney disease (CMS/[facility])     Avoid NSAIDS   Monitor renal panel  8/11/2023 BUN/Creat 18/1.4         Hypokalemia     Potassium 20 meq by mouth daily   8/11/2023 Potassium- 4.2         NSTEMI (non-ST elevation myocardial infarction) (CMS/[facility])     Denies CP  C 7/21/2023 Stenosis proximal nondominant left Cx, lesion stable   Beta Blocker, Statin, and  Plavix 75 mg by mouth daily                Osteoarthritis of multiple joints     Acetaminophen 650 mg by mouth every 6 hrs as needed          Physical deconditioning     PT/OT         Vitamin D deficiency     D2 50,000 units by mouth every Wednesday 8/11/2023: Vitamin D- 31.72         Type 2 diabetes mellitus without complication (CMS/[facility])     Glipizide 5 mg by mouth BID  Degludec 16 units subcutaneous daily   Glucose checks BID               HPI: Client admitted at [facility] from 8/3/2023- 8/10/2023 skilled, diagnosis Weakness, CVA, NSTEMI, and Dementia. On 7/30/2023 client presented to [facility] ED with CP and SOB. Cardiology consulted- acute coronary syndrome. [facility] completed 7/31/2023. RUE weakness- MRI brain showed acute left MCA CVA. Client is [place]. Client denies HA, dizziness, or  lightheadedness. Denies CP, SOB, Cough, or increased edema. RA. Denies abdominal pain, N/V, diarrhea, or constipation. Denies dysuria. States appetite is decreased. No current C/O pain.      Review of Systems: Limited, poor historian     Physical Exam  Constitutional:       Comments: Resting in bed    HENT:      Mouth/Throat:      Mouth: Mucous membranes are moist.   Cardiovascular:      Rate and Rhythm: Normal rate.   Pulmonary:      Effort: Pulmonary effort is normal.      Breath sounds: Normal breath sounds.      Comments: RA  Abdominal:      General: Bowel sounds are normal.   Genitourinary:     Comments: Denies dysuria   Musculoskeletal:      Cervical back: Neck supple.      Comments: CVA- RUE weakness  PT/OT    Skin:     General: Skin is warm and dry.   Neurological:      Mental Status: She is alert. Mental status is at baseline.   Psychiatric:         Mood and Affect: Mood normal.      Comments: Conversational

## 2023-08-20 NOTE — ASSESSMENT & PLAN NOTE
Denies BRIDGER  [facility] 7/21/2023 Stenosis proximal nondominant left Cx, lesion stable   Beta Blocker, Statin, and  Plavix 75 mg by mouth daily          ambulatory

## 2023-08-25 ENCOUNTER — NURSING HOME VISIT (OUTPATIENT)
Dept: POST ACUTE CARE | Facility: EXTERNAL LOCATION | Age: 88
End: 2023-08-25
Payer: MEDICARE

## 2023-08-25 DIAGNOSIS — E11.9 TYPE 2 DIABETES MELLITUS WITHOUT COMPLICATION, WITH LONG-TERM CURRENT USE OF INSULIN (MULTI): ICD-10-CM

## 2023-08-25 DIAGNOSIS — I10 BENIGN ESSENTIAL HTN: Primary | ICD-10-CM

## 2023-08-25 DIAGNOSIS — Z79.4 TYPE 2 DIABETES MELLITUS WITHOUT COMPLICATION, WITH LONG-TERM CURRENT USE OF INSULIN (MULTI): ICD-10-CM

## 2023-08-25 DIAGNOSIS — I63.9 CEREBROVASCULAR ACCIDENT (CVA), UNSPECIFIED MECHANISM (MULTI): ICD-10-CM

## 2023-08-25 DIAGNOSIS — R53.81 PHYSICAL DECONDITIONING: ICD-10-CM

## 2023-08-25 PROCEDURE — 99308 SBSQ NF CARE LOW MDM 20: CPT | Performed by: NURSE PRACTITIONER

## 2023-08-25 NOTE — LETTER
Patient: Stella Saturday  : 1935    Encounter Date: 2023    Patient ID: Stella WILKES Saturday is a 87 y.o. female who is acute skilled care being seen and evaluated for multiple medical problems.  [id number]   1935    /60   Pulse 67   Temp 36.6 °C (97.8 °F)   Resp 16   Wt 61.2 kg (135 lb)   SpO2 99%   BMI 23.17 kg/m²     Assessment/Plan  Problem List Items Addressed This Visit       Benign essential HTN - Primary     Losartan 25 mg by mouth daily   Metoprolol tart 25 mg by mouth BID         CVA (cerebral vascular accident) (CMS/HCC)     RUE weakness  MRI brain- Acute left MCA territory infarcts         Physical deconditioning     PT/OT         Type 2 diabetes mellitus without complication (CMS/HCC)     Glipizide 5 mg by mouth BID  Degludec 16 units subcutaneous daily   Glucose checks BID               HPI: Client continues to receive PT services. Client [place]. Client denies HA, dizziness, or lightheadedness. Denies CP, SOB, Cough, or increased edema. Denies abdominal pain, N/V, diarrhea, or constipation. Denies dysuria. Denies change in appetite. Denies insomnia. Denies current pain. Expresses desire to be DC to home.      Review of Systems ROS as described in in HPI     Physical Exam  Constitutional:       Comments: Sitting at bedside    HENT:      Mouth/Throat:      Mouth: Mucous membranes are moist.   Cardiovascular:      Rate and Rhythm: Normal rate.   Pulmonary:      Effort: Pulmonary effort is normal.      Breath sounds: Normal breath sounds.      Comments: RA  Abdominal:      General: Bowel sounds are normal.   Genitourinary:     Comments: Denies dysuria   Musculoskeletal:      Cervical back: Neck supple.      Comments: PT/OT  Slight RUE weakness  S/P CVA   Skin:     General: Skin is warm and dry.   Neurological:      Mental Status: She is alert. Mental status is at baseline.   Psychiatric:         Mood and Affect: Mood normal.      Comments: Conversational                     Electronically Signed By: SD Angelo-CNP   8/28/23  6:26 AM

## 2023-08-28 VITALS
TEMPERATURE: 97.8 F | DIASTOLIC BLOOD PRESSURE: 60 MMHG | HEART RATE: 67 BPM | RESPIRATION RATE: 16 BRPM | WEIGHT: 135 LBS | SYSTOLIC BLOOD PRESSURE: 133 MMHG | OXYGEN SATURATION: 99 % | BODY MASS INDEX: 23.17 KG/M2

## 2023-08-28 NOTE — PROGRESS NOTES
Patient ID: Stella WILKES Saturday is a 87 y.o. female who is acute skilled care being seen and evaluated for multiple medical problems.  [id number]   1935    /60   Pulse 67   Temp 36.6 °C (97.8 °F)   Resp 16   Wt 61.2 kg (135 lb)   SpO2 99%   BMI 23.17 kg/m²     Assessment/Plan  Problem List Items Addressed This Visit       Benign essential HTN - Primary     Losartan 25 mg by mouth daily   Metoprolol tart 25 mg by mouth BID         CVA (cerebral vascular accident) (CMS/HCC)     RUE weakness  MRI brain- Acute left MCA territory infarcts         Physical deconditioning     PT/OT         Type 2 diabetes mellitus without complication (CMS/HCC)     Glipizide 5 mg by mouth BID  Degludec 16 units subcutaneous daily   Glucose checks BID               HPI: Client continues to receive PT services. Client [place]. Client denies HA, dizziness, or lightheadedness. Denies CP, SOB, Cough, or increased edema. Denies abdominal pain, N/V, diarrhea, or constipation. Denies dysuria. Denies change in appetite. Denies insomnia. Denies current pain. Expresses desire to be DC to home.      Review of Systems ROS as described in in HPI     Physical Exam  Constitutional:       Comments: Sitting at bedside    HENT:      Mouth/Throat:      Mouth: Mucous membranes are moist.   Cardiovascular:      Rate and Rhythm: Normal rate.   Pulmonary:      Effort: Pulmonary effort is normal.      Breath sounds: Normal breath sounds.      Comments: RA  Abdominal:      General: Bowel sounds are normal.   Genitourinary:     Comments: Denies dysuria   Musculoskeletal:      Cervical back: Neck supple.      Comments: PT/OT  Slight RUE weakness  S/P CVA   Skin:     General: Skin is warm and dry.   Neurological:      Mental Status: She is alert. Mental status is at baseline.   Psychiatric:         Mood and Affect: Mood normal.      Comments: Conversational

## 2023-09-05 ENCOUNTER — NURSING HOME VISIT (OUTPATIENT)
Dept: POST ACUTE CARE | Facility: EXTERNAL LOCATION | Age: 88
End: 2023-09-05
Payer: MEDICARE

## 2023-09-05 DIAGNOSIS — I10 BENIGN ESSENTIAL HTN: Primary | ICD-10-CM

## 2023-09-05 DIAGNOSIS — E11.9 TYPE 2 DIABETES MELLITUS WITHOUT COMPLICATION, WITHOUT LONG-TERM CURRENT USE OF INSULIN (MULTI): ICD-10-CM

## 2023-09-05 DIAGNOSIS — R53.81 PHYSICAL DECONDITIONING: ICD-10-CM

## 2023-09-05 DIAGNOSIS — I63.9 CEREBROVASCULAR ACCIDENT (CVA), UNSPECIFIED MECHANISM (MULTI): ICD-10-CM

## 2023-09-05 PROCEDURE — 99308 SBSQ NF CARE LOW MDM 20: CPT | Performed by: NURSE PRACTITIONER

## 2023-09-05 NOTE — LETTER
Patient: Stella Saturday  : 1935    Encounter Date: 2023    Patient ID: Stella WILKES Saturday is a 87 y.o. female who is acute skilled care being seen and evaluated for multiple medical problems.  [id number]   1935    /89   Pulse 82   Temp 36.6 °C (97.8 °F)   Resp 15   Wt 61.1 kg (134 lb 12.8 oz)   SpO2 98%   BMI 23.14 kg/m²     Assessment/Plan  Problem List Items Addressed This Visit       Benign essential HTN - Primary     Losartan 25 mg by mouth daily   Metoprolol tart 25 mg by mouth BID         CVA (cerebral vascular accident) (CMS/[facility])     RUE weakness  MRI brain- Acute left MCA territory infarcts         Physical deconditioning     PT/OT         Type 2 diabetes mellitus without complication, without long-term current use of insulin (CMS/[facility])     Glipizide 5 mg by mouth BID  Degludec 16 units subcutaneous daily   Glucose checks BID               HPI: Client continues to receive PT services, H/O CVA. Client denies HA, dizziness, or lightheadedness. Denies CP, SOB, Cough, or increased edema. Denies abdominal pain, N/V, diarrhea, or constipation. Denies dysuria or hematuria. Denies change in appetite. Denies insomnia. Denies current pain.      Review of Systems ROS as described in in HPI     Physical Exam  Constitutional:       Comments: Sitting at bedside    HENT:      Mouth/Throat:      Mouth: Mucous membranes are moist.   Cardiovascular:      Rate and Rhythm: Normal rate.   Pulmonary:      Effort: Pulmonary effort is normal.      Breath sounds: Normal breath sounds.      Comments: RA  Abdominal:      General: Bowel sounds are normal.   Genitourinary:     Comments: Denies dysuria   Musculoskeletal:      Cervical back: Neck supple.      Right lower leg: Edema (slight) present.      Left lower leg: Edema (slight) present.      Comments: PT   Skin:     General: Skin is warm and dry.   Neurological:      Mental Status: She is alert. Mental status is at baseline.   Psychiatric:         Mood  and Affect: Mood normal.      Comments: Pleasant   Conversational                    Electronically Signed By: ANUSHKA Angelo   9/6/23  5:50 PM

## 2023-09-06 VITALS
HEART RATE: 82 BPM | DIASTOLIC BLOOD PRESSURE: 89 MMHG | BODY MASS INDEX: 23.14 KG/M2 | WEIGHT: 134.8 LBS | RESPIRATION RATE: 15 BRPM | SYSTOLIC BLOOD PRESSURE: 117 MMHG | TEMPERATURE: 97.8 F | OXYGEN SATURATION: 98 %

## 2023-09-06 PROBLEM — M15.0 PRIMARY GENERALIZED (OSTEO)ARTHRITIS: Status: ACTIVE | Noted: 2020-09-14

## 2023-09-06 PROBLEM — E11.641: Status: ACTIVE | Noted: 2020-09-21

## 2023-09-06 PROBLEM — E11.641: Status: RESOLVED | Noted: 2020-09-21 | Resolved: 2023-09-06

## 2023-09-06 PROBLEM — I11.9 HYPERTENSIVE HEART DISEASE WITHOUT HEART FAILURE: Status: ACTIVE | Noted: 2023-09-06

## 2023-09-06 PROBLEM — Z91.81 HISTORY OF FALLING: Status: ACTIVE | Noted: 2020-09-21

## 2023-09-06 PROBLEM — E11.9 TYPE 2 DIABETES MELLITUS WITHOUT COMPLICATION, WITHOUT LONG-TERM CURRENT USE OF INSULIN (MULTI): Status: ACTIVE | Noted: 2020-09-21

## 2023-09-06 PROBLEM — Z79.82 LONG TERM (CURRENT) USE OF ASPIRIN: Status: ACTIVE | Noted: 2020-09-21

## 2023-09-06 PROBLEM — K57.90 DIVERTICULOSIS OF INTESTINE, PART UNSPECIFIED, WITHOUT PERFORATION OR ABSCESS WITHOUT BLEEDING: Status: ACTIVE | Noted: 2020-09-21

## 2023-09-06 RX ORDER — GUAIFENESIN 600 MG/1
TABLET, EXTENDED RELEASE ORAL
COMMUNITY
Start: 2022-08-08 | End: 2024-02-12 | Stop reason: WASHOUT

## 2023-09-06 RX ORDER — ACETAMINOPHEN 325 MG/1
TABLET ORAL
COMMUNITY
Start: 2023-04-05

## 2023-09-06 RX ORDER — ERGOCALCIFEROL 1.25 MG/1
1 CAPSULE ORAL WEEKLY
COMMUNITY
Start: 2015-11-04 | End: 2023-10-27 | Stop reason: SDUPTHER

## 2023-09-06 RX ORDER — ROSUVASTATIN CALCIUM 40 MG/1
TABLET, COATED ORAL
COMMUNITY
Start: 2023-08-03 | End: 2024-02-12 | Stop reason: WASHOUT

## 2023-09-06 RX ORDER — TRIAMTERENE/HYDROCHLOROTHIAZID 37.5-25 MG
TABLET ORAL
COMMUNITY
Start: 2023-08-14 | End: 2024-02-12 | Stop reason: WASHOUT

## 2023-09-06 RX ORDER — PEN NEEDLE, DIABETIC 30 GX3/16"
NEEDLE, DISPOSABLE MISCELLANEOUS
COMMUNITY

## 2023-09-06 RX ORDER — LEVETIRACETAM 500 MG/1
TABLET ORAL
COMMUNITY
Start: 2022-04-25 | End: 2024-02-12 | Stop reason: WASHOUT

## 2023-09-06 RX ORDER — AMLODIPINE BESYLATE 2.5 MG/1
1 TABLET ORAL DAILY
COMMUNITY
Start: 2022-09-22 | End: 2024-04-22 | Stop reason: SDUPTHER

## 2023-09-06 RX ORDER — INSULIN ASPART 100 [IU]/ML
INJECTION, SOLUTION INTRAVENOUS; SUBCUTANEOUS
COMMUNITY
Start: 2023-04-05 | End: 2023-11-27 | Stop reason: SDUPTHER

## 2023-09-06 RX ORDER — SIMVASTATIN 40 MG/1
TABLET, FILM COATED ORAL
COMMUNITY
Start: 2018-11-08 | End: 2024-02-12 | Stop reason: WASHOUT

## 2023-09-06 RX ORDER — IBANDRONATE SODIUM 150 MG/1
TABLET, FILM COATED ORAL
COMMUNITY
End: 2024-02-12 | Stop reason: WASHOUT

## 2023-09-06 RX ORDER — IBUPROFEN 200 MG
TABLET ORAL
COMMUNITY
Start: 2020-08-26

## 2023-09-06 RX ORDER — QUETIAPINE FUMARATE 25 MG/1
TABLET, FILM COATED ORAL
COMMUNITY
Start: 2023-08-03 | End: 2024-02-12 | Stop reason: WASHOUT

## 2023-09-06 RX ORDER — ISOSORBIDE MONONITRATE 30 MG/1
TABLET, EXTENDED RELEASE ORAL
COMMUNITY
Start: 2023-08-04 | End: 2024-02-12 | Stop reason: WASHOUT

## 2023-09-06 RX ORDER — DOCUSATE SODIUM 100 MG/1
CAPSULE, LIQUID FILLED ORAL
COMMUNITY
Start: 2023-04-05

## 2023-09-06 RX ORDER — LIDOCAINE 50 MG/G
PATCH TOPICAL
COMMUNITY
Start: 2022-10-20

## 2023-09-06 RX ORDER — GLUCOSAM/CHONDRO/HERB 149/HYAL 750-100 MG
TABLET ORAL
COMMUNITY

## 2023-09-06 RX ORDER — LEVOTHYROXINE SODIUM 112 UG/1
TABLET ORAL
COMMUNITY
Start: 2022-11-18 | End: 2023-12-07 | Stop reason: WASHOUT

## 2023-09-06 RX ORDER — LEVOTHYROXINE SODIUM 100 UG/1
TABLET ORAL
COMMUNITY
Start: 2023-08-04 | End: 2023-12-07 | Stop reason: WASHOUT

## 2023-09-06 RX ORDER — COLCHICINE 0.6 MG/1
TABLET ORAL
COMMUNITY
Start: 2020-01-01 | End: 2024-02-12 | Stop reason: WASHOUT

## 2023-09-06 RX ORDER — LEVOTHYROXINE SODIUM 137 UG/1
TABLET ORAL
COMMUNITY
Start: 2023-02-03 | End: 2023-12-07 | Stop reason: WASHOUT

## 2023-09-06 RX ORDER — FUROSEMIDE 20 MG/1
1 TABLET ORAL DAILY
COMMUNITY
End: 2024-02-12 | Stop reason: WASHOUT

## 2023-09-06 RX ORDER — LEVOTHYROXINE SODIUM 75 UG/1
TABLET ORAL
COMMUNITY
Start: 2022-04-25 | End: 2023-12-07 | Stop reason: WASHOUT

## 2023-09-06 NOTE — PROGRESS NOTES
Patient ID: Stella WILKES Saturday is a 87 y.o. female who is acute skilled care being seen and evaluated for multiple medical problems.  [id number]   1935    /89   Pulse 82   Temp 36.6 °C (97.8 °F)   Resp 15   Wt 61.1 kg (134 lb 12.8 oz)   SpO2 98%   BMI 23.14 kg/m²     Assessment/Plan  Problem List Items Addressed This Visit       Benign essential HTN - Primary     Losartan 25 mg by mouth daily   Metoprolol tart 25 mg by mouth BID         CVA (cerebral vascular accident) (CMS/[facility])     RUE weakness  MRI brain- Acute left MCA territory infarcts         Physical deconditioning     PT/OT         Type 2 diabetes mellitus without complication, without long-term current use of insulin (CMS/[facility])     Glipizide 5 mg by mouth BID  Degludec 16 units subcutaneous daily   Glucose checks BID               HPI: Client continues to receive PT services, H/O CVA. Client denies HA, dizziness, or lightheadedness. Denies CP, SOB, Cough, or increased edema. Denies abdominal pain, N/V, diarrhea, or constipation. Denies dysuria or hematuria. Denies change in appetite. Denies insomnia. Denies current pain.      Review of Systems ROS as described in in HPI     Physical Exam  Constitutional:       Comments: Sitting at bedside    HENT:      Mouth/Throat:      Mouth: Mucous membranes are moist.   Cardiovascular:      Rate and Rhythm: Normal rate.   Pulmonary:      Effort: Pulmonary effort is normal.      Breath sounds: Normal breath sounds.      Comments: RA  Abdominal:      General: Bowel sounds are normal.   Genitourinary:     Comments: Denies dysuria   Musculoskeletal:      Cervical back: Neck supple.      Right lower leg: Edema (slight) present.      Left lower leg: Edema (slight) present.      Comments: PT   Skin:     General: Skin is warm and dry.   Neurological:      Mental Status: She is alert. Mental status is at baseline.   Psychiatric:         Mood and Affect: Mood normal.      Comments: Pleasant   Conversational

## 2023-09-21 ENCOUNTER — NURSING HOME VISIT (OUTPATIENT)
Dept: POST ACUTE CARE | Facility: EXTERNAL LOCATION | Age: 88
End: 2023-09-21
Payer: MEDICARE

## 2023-09-21 DIAGNOSIS — I10 BENIGN ESSENTIAL HTN: Primary | ICD-10-CM

## 2023-09-21 DIAGNOSIS — N18.32 STAGE 3B CHRONIC KIDNEY DISEASE (MULTI): ICD-10-CM

## 2023-09-21 DIAGNOSIS — E11.9 TYPE 2 DIABETES MELLITUS WITHOUT COMPLICATION, WITHOUT LONG-TERM CURRENT USE OF INSULIN (MULTI): ICD-10-CM

## 2023-09-21 DIAGNOSIS — R53.81 PHYSICAL DECONDITIONING: ICD-10-CM

## 2023-09-21 PROCEDURE — 99308 SBSQ NF CARE LOW MDM 20: CPT | Performed by: NURSE PRACTITIONER

## 2023-09-21 NOTE — LETTER
Patient: Stella Saturday  : 1935    Encounter Date: 2023    Patient ID: Stella WILKES Saturday is a 87 y.o. female who is acute skilled care being seen and evaluated for multiple medical problems.  [id number]   1935    /60   Pulse 72   Temp 36.6 °C (97.9 °F)   Resp 16   Wt 61.2 kg (135 lb)   SpO2 98%   BMI 23.17 kg/m²     Assessment/Plan  Problem List Items Addressed This Visit       Benign essential HTN - Primary     Losartan 25 mg by mouth daily   Metoprolol tart 25 mg by mouth BID         Stage 3b chronic kidney disease (CMS/[facility])     Avoid NSAIDS   Monitor renal panel  2023 BUN/Creat 18/1.4         Physical deconditioning     PT/OT         Type 2 diabetes mellitus without complication, without long-term current use of insulin (CMS/[facility])     Glipizide 5 mg by mouth BID  Degludec 16 units subcutaneous daily   Glucose checks BID   2023 HGBA1C 6.0              HPI: Client continues to receive PT services. Client pending DC to home tomorrow. Client denies HA, dizziness, or lightheadedness. Denies CP, SOB, Cough, or increased edema. Denies abdominal pain, N/V, diarrhea, or constipation. Denies dysuria. Denies change in appetite. Denies insomnia. No current C/O pain.     Review of Systems ROS as described in in HPI     Physical Exam  Constitutional:       Comments: Ambulating with Rolator    HENT:      Mouth/Throat:      Mouth: Mucous membranes are moist.   Cardiovascular:      Rate and Rhythm: Normal rate.   Pulmonary:      Effort: Pulmonary effort is normal.      Comments: RA  Abdominal:      General: Bowel sounds are normal.   Genitourinary:     Comments: Denies dysuria   Musculoskeletal:      Cervical back: Neck supple.      Comments: PT/OT  Rolator    Skin:     General: Skin is warm and dry.   Neurological:      Mental Status: She is alert. Mental status is at baseline.   Psychiatric:         Mood and Affect: Mood normal.      Comments: Pleasant  Pleased to be going home tomorrow                     Electronically Signed By: SD Angelo-CNP   9/23/23  7:15 PM

## 2023-09-23 VITALS
BODY MASS INDEX: 23.17 KG/M2 | HEART RATE: 72 BPM | OXYGEN SATURATION: 98 % | TEMPERATURE: 97.9 F | WEIGHT: 135 LBS | SYSTOLIC BLOOD PRESSURE: 112 MMHG | RESPIRATION RATE: 16 BRPM | DIASTOLIC BLOOD PRESSURE: 60 MMHG

## 2023-09-23 NOTE — PROGRESS NOTES
Patient ID: Stella WILKES Saturday is a 87 y.o. female who is acute skilled care being seen and evaluated for multiple medical problems.  [id number]   1935    /60   Pulse 72   Temp 36.6 °C (97.9 °F)   Resp 16   Wt 61.2 kg (135 lb)   SpO2 98%   BMI 23.17 kg/m²     Assessment/Plan  Problem List Items Addressed This Visit       Benign essential HTN - Primary     Losartan 25 mg by mouth daily   Metoprolol tart 25 mg by mouth BID         Stage 3b chronic kidney disease (CMS/HCC)     Avoid NSAIDS   Monitor renal panel  8/11/2023 BUN/Creat 18/1.4         Physical deconditioning     PT/OT         Type 2 diabetes mellitus without complication, without long-term current use of insulin (CMS/[facility])     Glipizide 5 mg by mouth BID  Degludec 16 units subcutaneous daily   Glucose checks BID   8/28/2023 HGBA1C 6.0              HPI: Client continues to receive PT services. Client pending DC to home tomorrow. Client denies HA, dizziness, or lightheadedness. Denies CP, SOB, Cough, or increased edema. Denies abdominal pain, N/V, diarrhea, or constipation. Denies dysuria. Denies change in appetite. Denies insomnia. No current C/O pain.     Review of Systems ROS as described in in HPI     Physical Exam  Constitutional:       Comments: Ambulating with Rolator    HENT:      Mouth/Throat:      Mouth: Mucous membranes are moist.   Cardiovascular:      Rate and Rhythm: Normal rate.   Pulmonary:      Effort: Pulmonary effort is normal.      Comments: RA  Abdominal:      General: Bowel sounds are normal.   Genitourinary:     Comments: Denies dysuria   Musculoskeletal:      Cervical back: Neck supple.      Comments: PT/OT  Rolator    Skin:     General: Skin is warm and dry.   Neurological:      Mental Status: She is alert. Mental status is at baseline.   Psychiatric:         Mood and Affect: Mood normal.      Comments: Pleasant  Pleased to be going home tomorrow

## 2023-09-23 NOTE — ASSESSMENT & PLAN NOTE
Glipizide 5 mg by mouth BID  Degludec 16 units subcutaneous daily   Glucose checks BID   8/28/2023 HGBA1C 6.0

## 2023-10-09 ENCOUNTER — OFFICE VISIT (OUTPATIENT)
Dept: PRIMARY CARE | Facility: CLINIC | Age: 88
End: 2023-10-09
Payer: MEDICARE

## 2023-10-09 VITALS
OXYGEN SATURATION: 96 % | HEIGHT: 64 IN | HEART RATE: 111 BPM | BODY MASS INDEX: 21.51 KG/M2 | WEIGHT: 126 LBS | DIASTOLIC BLOOD PRESSURE: 61 MMHG | SYSTOLIC BLOOD PRESSURE: 119 MMHG

## 2023-10-09 DIAGNOSIS — Z00.00 MEDICARE ANNUAL WELLNESS VISIT, SUBSEQUENT: ICD-10-CM

## 2023-10-09 DIAGNOSIS — R53.81 PHYSICAL DECONDITIONING: ICD-10-CM

## 2023-10-09 DIAGNOSIS — I11.9 HYPERTENSIVE HEART DISEASE WITHOUT HEART FAILURE: ICD-10-CM

## 2023-10-09 DIAGNOSIS — N18.9 CHRONIC KIDNEY DISEASE, UNSPECIFIED CKD STAGE: ICD-10-CM

## 2023-10-09 DIAGNOSIS — E11.9 TYPE 2 DIABETES MELLITUS WITHOUT COMPLICATION, WITHOUT LONG-TERM CURRENT USE OF INSULIN (MULTI): ICD-10-CM

## 2023-10-09 DIAGNOSIS — I21.4 NSTEMI (NON-ST ELEVATION MYOCARDIAL INFARCTION) (MULTI): ICD-10-CM

## 2023-10-09 DIAGNOSIS — D64.9 ANEMIA, UNSPECIFIED TYPE: ICD-10-CM

## 2023-10-09 DIAGNOSIS — Z91.81 HISTORY OF FALLING: ICD-10-CM

## 2023-10-09 DIAGNOSIS — I63.9 CEREBROVASCULAR ACCIDENT (CVA), UNSPECIFIED MECHANISM (MULTI): ICD-10-CM

## 2023-10-09 DIAGNOSIS — I25.118 CORONARY ARTERY DISEASE OF NATIVE ARTERY OF NATIVE HEART WITH STABLE ANGINA PECTORIS (CMS-HCC): ICD-10-CM

## 2023-10-09 DIAGNOSIS — N18.32 STAGE 3B CHRONIC KIDNEY DISEASE (MULTI): ICD-10-CM

## 2023-10-09 DIAGNOSIS — E78.5 HYPERLIPIDEMIA, UNSPECIFIED HYPERLIPIDEMIA TYPE: ICD-10-CM

## 2023-10-09 DIAGNOSIS — E03.9 HYPOTHYROIDISM, UNSPECIFIED TYPE: ICD-10-CM

## 2023-10-09 PROCEDURE — 1160F RVW MEDS BY RX/DR IN RCRD: CPT | Performed by: INTERNAL MEDICINE

## 2023-10-09 PROCEDURE — 1036F TOBACCO NON-USER: CPT | Performed by: INTERNAL MEDICINE

## 2023-10-09 PROCEDURE — 1126F AMNT PAIN NOTED NONE PRSNT: CPT | Performed by: INTERNAL MEDICINE

## 2023-10-09 PROCEDURE — 1170F FXNL STATUS ASSESSED: CPT | Performed by: INTERNAL MEDICINE

## 2023-10-09 PROCEDURE — 1159F MED LIST DOCD IN RCRD: CPT | Performed by: INTERNAL MEDICINE

## 2023-10-09 PROCEDURE — 3074F SYST BP LT 130 MM HG: CPT | Performed by: INTERNAL MEDICINE

## 2023-10-09 PROCEDURE — G0439 PPPS, SUBSEQ VISIT: HCPCS | Performed by: INTERNAL MEDICINE

## 2023-10-09 PROCEDURE — 99213 OFFICE O/P EST LOW 20 MIN: CPT | Performed by: INTERNAL MEDICINE

## 2023-10-09 PROCEDURE — 3078F DIAST BP <80 MM HG: CPT | Performed by: INTERNAL MEDICINE

## 2023-10-09 ASSESSMENT — ENCOUNTER SYMPTOMS
CARDIOVASCULAR NEGATIVE: 1
RESPIRATORY NEGATIVE: 1
GASTROINTESTINAL NEGATIVE: 1
CONSTITUTIONAL NEGATIVE: 1

## 2023-10-09 ASSESSMENT — PATIENT HEALTH QUESTIONNAIRE - PHQ9
2. FEELING DOWN, DEPRESSED OR HOPELESS: NOT AT ALL
SUM OF ALL RESPONSES TO PHQ9 QUESTIONS 1 AND 2: 0
1. LITTLE INTEREST OR PLEASURE IN DOING THINGS: NOT AT ALL

## 2023-10-09 ASSESSMENT — ACTIVITIES OF DAILY LIVING (ADL)
DOING_HOUSEWORK: TOTAL CARE
DRESSING: NEEDS ASSISTANCE
TAKING_MEDICATION: NEEDS ASSISTANCE
MANAGING_FINANCES: TOTAL CARE
GROCERY_SHOPPING: NEEDS ASSISTANCE
BATHING: NEEDS ASSISTANCE

## 2023-10-09 NOTE — PATIENT INSTRUCTIONS
Please continue to take your current medications   Please call us with updated medication list when you are able  Make sure you eat a snack at bedtime, such as cracker and cheese, avoid sugary foods at night  Decrease insulin to 14 units daily  Please get bloodwork drawn in two weeks. You do not need to fast for these labs. We will call you with results.    Follow up in three months

## 2023-10-09 NOTE — PROGRESS NOTES
Patient ID: She states she is doing ok, other than she has some shortness of breath after doing physical therapy this morning. She states she has been having SOB at times. She is accompanied by her nephew who reports she uses cane or walker to ambulate around her home and when out. Her nephew reports that she has diarrhea since being put on iron pill. She states she had a couple episodes of low blood sugar since being home from SNF and she had to take sugar tablets.      ANIBAL Douglas A Saturday is a 87 y.o. female with PMH remarkable for HTN, IDDM-II, hypothyroidism, CKD who presents to the office today to establish care. She was recently admitted to [facility] after presenting to ER on 7/30 with complaint of shortness of breath and dull chest pain. Initial troponin was 0.117 and trended up to 0.479. EKG showed normal sinus rhythm. Chest x-ray showed no acute abnormality. Per hospital notes, cardiology was consulted, suspecting acute coronary syndrome and not acute diastolic heart failure. LHC on 7/31 showed stenosis of proximal nondominant LCx, lesion is not critical or unstable, recommended nitrates in addition to beta-blockers, statin and Plavix. Could be feasible for PCI but would prefer to avoid per cardiology. Per hospital notes, pt was having right upper extremity weakness before the cath, therefore brain MRI was done which showed acute left MCA territory infarcts.  Per neurology , no indication for DAPT, might not be appropriate for oral anticoagulant with hx of falls. Attempted RE on 8/3 but pt became very drowsy after starting sedation so procedure was stopped. Discussed with cardiology, to hold off RE with  risk of sedation with anesthesia as it won't  regarding AC for now given her risk of falls. While inpatient, MOCA score was found to be 4, was felt she has dementia. She was discharged to SNF on 8/10/23 for rehab.     HEALTH MAINTENANCE: Establish Care  Previous PCP: Dr. Wallace  Smoking:  "never smoker  Mammogram (40-75): n/a due to age  Pap smear (21-65): n/a due to age  Labs: 8/4/23  Colonoscopy (45-75): n/a due to age  Lung cancer screening (55-80 + 30 pack year + smoking/quit in last 15 years): n/a  DEXA (65+, q 2 years): 10/28/2020 osteopenia  2D echo on 3/6/23: 1. Left ventricular systolic function is normal with a 65-70% estimated ejection fraction. 2. Spectral Doppler shows an impaired relaxation pattern of left ventricular diastolic filling. 3. There is moderate to severe mitral annular calcification. 4. Mild aortic valve stenosis. 5. There is moderate aortic valve cusp calcification.    SOCIAL HISTORY:  Social History     Tobacco Use    Smoking status: Never    Smokeless tobacco: Never   Vaping Use    Vaping Use: Never used   Substance Use Topics    Alcohol use: Never    Drug use: Never     IMMUNIZATIONS:  Immunization History   Administered Date(s) Administered    Influenza, High Dose Seasonal, Preservative Free 10/11/2016, 10/11/2017, 09/11/2018, 09/14/2020, 11/01/2021, 09/22/2022    Influenza, Unspecified 09/18/2013, 10/03/2019    Influenza, seasonal, injectable 10/08/2012    Moderna SARS-CoV-2 Vaccination 01/30/2021, 02/27/2021, 08/30/2022    Pneumococcal polysaccharide vaccine, 23-valent, age 2 years and older (PNEUMOVAX 23) 10/03/2019     REVIEW OF SYSTEMS:  Review of Systems   Constitutional: Negative.    HENT:  Positive for hearing loss.    Respiratory: Negative.     Cardiovascular: Negative.    Gastrointestinal: Negative.    Genitourinary: Negative.      ALLERGIES:  Allergies   Allergen Reactions    Metformin Diarrhea    Penicillins Nausea Only and Other     SOB    Sulfa (Sulfonamide Antibiotics) Unknown    Allopurinol Itching and Rash      VITAL SIGNS:  Vitals:    10/09/23 1037   BP: 119/61   BP Location: Right arm   Pulse: (!) 111   SpO2: 96%   Weight: 57.2 kg (126 lb)   Height: 1.626 m (5' 4\")     Physical Exam  Vitals reviewed.   Constitutional:       Appearance: She is " cachectic.   HENT:      Head: Normocephalic and atraumatic.   Cardiovascular:      Rate and Rhythm: Normal rate and regular rhythm.      Pulses: Normal pulses.      Heart sounds: Normal heart sounds.   Pulmonary:      Effort: Pulmonary effort is normal.      Breath sounds: Normal breath sounds.   Abdominal:      General: Bowel sounds are normal.      Palpations: Abdomen is soft.   Skin:     General: Skin is warm and dry.   Neurological:      General: No focal deficit present.      Mental Status: She is alert and oriented to person, place, and time.   Psychiatric:         Mood and Affect: Mood normal.         Behavior: Behavior normal.       MEDICATIONS:  Current Outpatient Medications on File Prior to Visit   Medication Sig Dispense Refill    acetaminophen (Tylenol) 325 mg tablet Take by mouth.      amLODIPine (Norvasc) 2.5 mg tablet Take 1 tablet (2.5 mg) by mouth once daily.      atorvastatin (Lipitor) 80 mg tablet take 1 tablet by mouth at bedtime 90 tablet 0    blood sugar diagnostic strip 3 times a day. TEST      clopidogrel (Plavix) 75 mg tablet TAKE 1 TABLET BY MOUTH EVERY DAY 90 tablet 0    colchicine, gout, (Colcrys) 0.6 mg tablet 0.6 mg DAILY (route: oral)      cyanocobalamin (Vitamin B-12) 1,000 mcg tablet Take 1 tablet (1,000 mcg) by mouth once daily.      docusate sodium (Colace) 100 mg capsule Take by mouth.      ergocalciferol (Vitamin D-2) 1.25 MG (75906 UT) capsule Take 1 capsule (1,250 mcg) by mouth once a week.      ferrous sulfate (FeroSuL) 325 (65 Fe) MG tablet Take 1 tablet (325 mg) by mouth once daily. as directed 90 tablet 0    furosemide (Lasix) 20 mg tablet Take 1 tablet (20 mg) by mouth once daily. As directed      glipiZIDE (Glucotrol) 5 mg tablet TAKE 1 (ONE) TABLET BY MOUTH TWICE DAILY BEFORE MEALS 60 tablet 1    glucose 4 gram chewable tablet 8 g AS DIRECTED (route: BY MOUTH)      guaiFENesin (Mucinex) 600 mg 12 hr tablet Take by mouth.      ibandronate (Boniva) 150 mg tablet Take by  "mouth.      insulin aspart (NovoLOG) 100 unit/mL (3 mL) pen Inject under the skin.      isosorbide mononitrate ER (Imdur) 30 mg 24 hr tablet Take by mouth.      levETIRAcetam (Keppra) 500 mg tablet Take by mouth.      levothyroxine (Synthroid, Levoxyl) 100 mcg tablet Take by mouth.      levothyroxine (Synthroid, Levoxyl) 112 mcg tablet Take by mouth.      levothyroxine (Synthroid, Levoxyl) 137 mcg tablet Take by mouth. AS DIRECTED      levothyroxine (Synthroid, Levoxyl) 150 mcg tablet Take 1 tablet (150 mcg) by mouth once daily. 90 tablet 0    levothyroxine (Synthroid, Levoxyl) 75 mcg tablet Take by mouth.      lidocaine (Lidoderm) 5 % patch Apply topically.      losartan (Cozaar) 25 mg tablet TAKE 1 TABLET BY MOUTH EVERY DAY 90 tablet 0    metoprolol tartrate (Lopressor) 25 mg tablet Take 1 tablet (25 mg) by mouth 2 times a day. 180 tablet 0    omega 3-dha-epa-fish oil (Fish OiL) 1,000 mg (120 mg-180 mg) capsule Take by mouth.      omega-3s-dha-epa-fish oil (Sea-Omega) 200 mg-300 mg- 100 mg-1,000 mg capsule Take by mouth.      pen needle, diabetic 31 gauge x 5/16\" needle Use 1 time daily with insulin pen      potassium chloride CR (K-Tab) 20 mEq ER tablet Take 1 tablet (20 mEq) by mouth once daily. Do not crush, chew, or split. 90 tablet 0    QUEtiapine (SEROquel) 25 mg tablet Take by mouth.      rosuvastatin (Crestor) 40 mg tablet Take by mouth.      simvastatin (Zocor) 40 mg tablet Take by mouth.      Tresiba FlexTouch U-100 100 unit/mL (3 mL) injection Inject 16 Units under the skin once daily in the morning.      triamcinolone (Kenalog) 0.1 % ointment Apply twice daily 60 g 0    triamterene-hydrochlorothiazid (Maxzide-25) 37.5-25 mg tablet Take by mouth.       No current facility-administered medications on file prior to visit.      LABORATORY DATA:  Lab Results   Component Value Date    WBC 10.7 08/04/2023    HGB 9.7 (L) 08/04/2023    HCT 29.7 (L) 08/04/2023     08/04/2023    CHOL 105 12/29/2022    TRIG " "268 (H) 12/29/2022    HDL 29.6 (A) 12/29/2022    LDLDIRECT 95 01/17/2019    ALT 12 12/29/2022    AST 17 12/29/2022     (L) 08/04/2023    K 3.6 08/04/2023     08/04/2023    CREATININE 1.70 (H) 08/04/2023    BUN 38 (H) 08/04/2023    CO2 22 08/04/2023    TSH 0.05 (L) 04/06/2023    INR 0.9 08/22/2018    HGBA1C 6.3 08/01/2023       ASSESSMENT AND PLAN:  Assessment/Plan   Health Maintenance: establish care, former patient of Dr. Wallace and Medicare wellness exam  - she had recent hospitalization for NSTEMI, CVA at [facility] from 8/3-->8/10/23, and then was transferred to SNF for rehab for several weeks, then discharged to home with home health, nursing and therapy  - she is accompanied by her nephew today who reports that home health has been handling all of her medications and he will have to call us back with updated list  - reviewed most recent SNF notes and recent hospital notes from [facility]  - she had labs in August, will input orders to repeat CBC, BMP, TSH and free T4 in two weeks  - no further mammograms, colonoscopy or dexa scans due to her age  - she denies any depression  - Follow up in three months    DM type 2  - according to most recent SNF progress note, her most recent HgbA1c on 8/28/23 was 6.0  - she was continued on Glipizide 5mg bid and Degludec 16units daily  - she reports today that she has had a couple episodes of hypoglycemia and has had to take sugar tabs  - we will decrease Degludec to 14units daily  - will repeat HgbA1c at next follow up    CAD/HTN/recent NSTEMI  - reviewed hospital notes from August which read: \"recent LHC on 7/31 showed stenosis of proximal nondominant LCx, lesion is not critical or unstable, recommended nitrates in addition to beta-blockers, statin and Plavix. Could be feasible for PCI but would prefer to avoid per cardiology\"  - she is followed by cardiology, most recently saw Sarika Dowd in May  - her BP today is 119/61  - according to " most recent SNF note, she is on Metoprolol 25mg bid, Losartan, Plavix, statin  - advised nephew to call office with updated medication list so we can update our records  - she is not a candidate for oral anticoagulation due to age, risk of falling  - she had right sided weakness with CVA, still receiving home PT/OT  - she uses cane and walker for ambulation, per nephew, still gets around pretty good, but has unsteady gait  - Follow up in three months    Recent CVA/low MOCA score  - she had right side weakness prior to heart cath in July, MRI was done which revealed: acute left MCA territory infarcts  - she has upcoming appointment with neurology on 10/11/23, with Dr. Guan  - she had low MOCA score while inpatient in August, 4  - she reports she is forgetful, but she answers all questions appropriately on exam    CKD  - most recent BUN/Creatnine according to SNF note on 08/23/23 was 18/1.40 which was improved  - will continue monitoring, repeat BMP in two weeks    Anemia  - her nephew reports that she has had diarrhea since starting iron supplement  - advised to try to stop iron supplement to see if diarrhea resolves  - will check CBC in two weeks  - advised ok to take PRN imodium for diarrhea    Hypothyroidism  - continue on current levothyroxine dose  - will check TSH and free T4 in two weeks    --------------------  Written by Abida Li LPN, acting as a scribe for Dr. Jolley. This note accurately reflects the work and decisions made by Dr. Jolley.     I, Dr. Jolley, attest all medical record entries made by the scribe were under my direction and were personally dictated by me. I have reviewed the chart and agree that the record accurately reflects my performance of the history, physical exam, and assessment and plan.

## 2023-10-11 ENCOUNTER — OFFICE VISIT (OUTPATIENT)
Dept: NEUROLOGY | Facility: CLINIC | Age: 88
End: 2023-10-11
Payer: MEDICARE

## 2023-10-11 VITALS
SYSTOLIC BLOOD PRESSURE: 108 MMHG | WEIGHT: 128 LBS | HEART RATE: 53 BPM | DIASTOLIC BLOOD PRESSURE: 54 MMHG | HEIGHT: 66 IN | BODY MASS INDEX: 20.57 KG/M2

## 2023-10-11 DIAGNOSIS — R27.8 SENSORY ATAXIA: ICD-10-CM

## 2023-10-11 DIAGNOSIS — G44.221 CHRONIC TENSION-TYPE HEADACHE, INTRACTABLE: Primary | ICD-10-CM

## 2023-10-11 DIAGNOSIS — F03.90 DEMENTIA WITHOUT BEHAVIORAL DISTURBANCE (MULTI): ICD-10-CM

## 2023-10-11 DIAGNOSIS — I63.9 CEREBROVASCULAR ACCIDENT (CVA), UNSPECIFIED MECHANISM (MULTI): ICD-10-CM

## 2023-10-11 PROCEDURE — 99205 OFFICE O/P NEW HI 60 MIN: CPT | Performed by: PSYCHIATRY & NEUROLOGY

## 2023-10-11 PROCEDURE — 1160F RVW MEDS BY RX/DR IN RCRD: CPT | Performed by: PSYCHIATRY & NEUROLOGY

## 2023-10-11 PROCEDURE — 1036F TOBACCO NON-USER: CPT | Performed by: PSYCHIATRY & NEUROLOGY

## 2023-10-11 PROCEDURE — 1159F MED LIST DOCD IN RCRD: CPT | Performed by: PSYCHIATRY & NEUROLOGY

## 2023-10-11 PROCEDURE — 1126F AMNT PAIN NOTED NONE PRSNT: CPT | Performed by: PSYCHIATRY & NEUROLOGY

## 2023-10-11 PROCEDURE — 3074F SYST BP LT 130 MM HG: CPT | Performed by: PSYCHIATRY & NEUROLOGY

## 2023-10-11 PROCEDURE — 3078F DIAST BP <80 MM HG: CPT | Performed by: PSYCHIATRY & NEUROLOGY

## 2023-10-11 RX ORDER — TIZANIDINE 2 MG/1
2 TABLET ORAL DAILY
Qty: 30 TABLET | Refills: 2 | Status: SHIPPED | OUTPATIENT
Start: 2023-10-11 | End: 2024-01-23 | Stop reason: SDUPTHER

## 2023-10-11 NOTE — PATIENT INSTRUCTIONS
You have a chronic tension type headache- try the nightly tizanidine at 2 mg every night.  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  Continue your current medications, using the rollator for balance impairment.  I recommend 24 hour supervision due to the confusion and high risk of falling.  Follow up in 2-3 months, call in 3-4 months with a progress report on the headaches.

## 2023-10-11 NOTE — PROGRESS NOTES
Ashlee Douglas A Saturday is a 87 y.o.   female.  Stroke      This is a 87 year old woman here with her nephew Royal.  In April, she had hot spots in her head, MRI of the brain in May, [facility]. It showed moderate-severe chronic microvascular changes- MRI images reviewed with her nephew.  Around July 30th she went to [facility] for a fall, SOB.  MRI of the brain showed small acute ischemic strokes in the left MCA distribution.  MRA studies of the brain and neck were normal.  She stayed in the hospital until August 4th, discharged to [facility]- for one week of rehab, then transferred to [facility] for 5 weeks of rehab- then home.  She lives at home, semi-independent.  She has a home health aid and PT.  Family monitors her medications.  She has had several falls, now using a rollator.  She has imbalance.  She may have dementia.  She has had several years of intermittent focal head pains.  History of seizures- not recently, not on a AED.    PMH: HTN, IDDM-2, she is very hard of hearing, hypothyroidism.    Objective   Neurological Exam  Mental Status  Awake, alert and oriented to person, place and time. Language is fluent with no aphasia.    Cranial Nerves  CN II: Visual fields full to confrontation.  CN III, IV, VI: Extraocular movements intact bilaterally.  CN V: Facial sensation is normal.  CN VII: Full and symmetric facial movement.  CN VIII: Hearing is normal.  CN IX, X: Palate elevates symmetrically  CN XI: Shoulder shrug strength is normal.  CN XII: Tongue midline without atrophy or fasciculations.    Motor  Normal muscle bulk throughout. No fasciculations present. Normal muscle tone. No abnormal involuntary movements. Strength is 5/5 throughout all four extremities.    Sensory  Sensation is intact to light touch, pinprick, vibration and proprioception in all four extremities.    Coordination  Right: Finger-to-nose normal.Left: Finger-to-nose normal.    Gait  Casual gait:  Wide-based, unsteady.    Physical  Exam  Eyes:      Extraocular Movements: Extraocular movements intact.   Neurological:      Motor: Motor strength is normal.      I personally reviewed laboratory, radiographic, and medical studies which were pertinent for Stella.    Assessment/Plan

## 2023-10-27 DIAGNOSIS — E55.9 VITAMIN D DEFICIENCY: ICD-10-CM

## 2023-10-27 DIAGNOSIS — R53.83 FATIGUE, UNSPECIFIED TYPE: ICD-10-CM

## 2023-10-27 RX ORDER — ERGOCALCIFEROL 1.25 MG/1
1 CAPSULE ORAL
Qty: 12 CAPSULE | Refills: 0 | Status: SHIPPED | OUTPATIENT
Start: 2023-10-27 | End: 2024-02-12 | Stop reason: SDUPTHER

## 2023-10-27 RX ORDER — LANOLIN ALCOHOL/MO/W.PET/CERES
1000 CREAM (GRAM) TOPICAL DAILY
Qty: 90 TABLET | Refills: 1 | Status: SHIPPED | OUTPATIENT
Start: 2023-10-27 | End: 2024-02-12 | Stop reason: SDUPTHER

## 2023-11-27 DIAGNOSIS — E11.9 TYPE 2 DIABETES MELLITUS WITHOUT COMPLICATION, WITHOUT LONG-TERM CURRENT USE OF INSULIN (MULTI): ICD-10-CM

## 2023-11-27 RX ORDER — INSULIN DEGLUDEC 100 U/ML
16 INJECTION, SOLUTION SUBCUTANEOUS EVERY MORNING
Qty: 12 ML | Refills: 1 | Status: SHIPPED | OUTPATIENT
Start: 2023-11-27 | End: 2024-02-12 | Stop reason: SDUPTHER

## 2023-11-27 RX ORDER — INSULIN ASPART 100 [IU]/ML
14 INJECTION, SOLUTION INTRAVENOUS; SUBCUTANEOUS
Qty: 12 ML | Refills: 3 | Status: SHIPPED | OUTPATIENT
Start: 2023-11-27 | End: 2024-02-12

## 2023-11-27 NOTE — TELEPHONE ENCOUNTER
Est pt seeing you next on 2-12, last A1c was 6.3 on 8-1, pt reports refrigerator has stopped working and needs insulin

## 2023-12-07 DIAGNOSIS — E03.9 HYPOTHYROIDISM, UNSPECIFIED: ICD-10-CM

## 2023-12-07 DIAGNOSIS — E11.9 TYPE 2 DIABETES MELLITUS WITHOUT COMPLICATION, WITHOUT LONG-TERM CURRENT USE OF INSULIN (MULTI): ICD-10-CM

## 2023-12-08 RX ORDER — LEVOTHYROXINE SODIUM 150 UG/1
150 TABLET ORAL DAILY
Qty: 90 TABLET | Refills: 1 | Status: SHIPPED | OUTPATIENT
Start: 2023-12-08 | End: 2024-02-12 | Stop reason: SDUPTHER

## 2023-12-11 DIAGNOSIS — E87.6 HYPOKALEMIA: ICD-10-CM

## 2023-12-11 RX ORDER — POTASSIUM CHLORIDE 20 MEQ/1
20 TABLET, EXTENDED RELEASE ORAL DAILY
Qty: 90 TABLET | Refills: 1 | Status: SHIPPED | OUTPATIENT
Start: 2023-12-11 | End: 2023-12-22 | Stop reason: SDUPTHER

## 2023-12-22 DIAGNOSIS — E11.49 TYPE 2 DIABETES MELLITUS WITH OTHER DIABETIC NEUROLOGICAL COMPLICATION (MULTI): ICD-10-CM

## 2023-12-22 DIAGNOSIS — E87.6 HYPOKALEMIA: ICD-10-CM

## 2023-12-22 RX ORDER — GLIPIZIDE 5 MG/1
5 TABLET ORAL
Qty: 180 TABLET | Refills: 1 | Status: SHIPPED | OUTPATIENT
Start: 2023-12-22 | End: 2024-02-01 | Stop reason: ALTCHOICE

## 2023-12-22 RX ORDER — POTASSIUM CHLORIDE 20 MEQ/1
20 TABLET, EXTENDED RELEASE ORAL DAILY
Qty: 90 TABLET | Refills: 1 | Status: SHIPPED | OUTPATIENT
Start: 2023-12-22 | End: 2024-02-12 | Stop reason: SDUPTHER

## 2024-01-09 DIAGNOSIS — I25.10 ATHEROSCLEROTIC HEART DISEASE OF NATIVE CORONARY ARTERY WITHOUT ANGINA PECTORIS: ICD-10-CM

## 2024-01-09 DIAGNOSIS — E78.5 HYPERLIPIDEMIA, UNSPECIFIED: ICD-10-CM

## 2024-01-09 DIAGNOSIS — I10 ESSENTIAL (PRIMARY) HYPERTENSION: ICD-10-CM

## 2024-01-09 NOTE — TELEPHONE ENCOUNTER
Est pt seeing you next on 2-12, last bun 38, creatinine 1.70 from 8-4, last chol. 118, HDL 25, LDL 53, triglycerides 200 from 8-1

## 2024-01-10 RX ORDER — ATORVASTATIN CALCIUM 80 MG/1
80 TABLET, FILM COATED ORAL NIGHTLY
Qty: 30 TABLET | Refills: 0 | Status: SHIPPED | OUTPATIENT
Start: 2024-01-10 | End: 2024-02-12 | Stop reason: SDUPTHER

## 2024-01-10 RX ORDER — LOSARTAN POTASSIUM 25 MG/1
25 TABLET ORAL DAILY
Qty: 30 TABLET | Refills: 0 | Status: SHIPPED | OUTPATIENT
Start: 2024-01-10 | End: 2024-02-12 | Stop reason: SDUPTHER

## 2024-01-10 RX ORDER — METOPROLOL TARTRATE 25 MG/1
25 TABLET, FILM COATED ORAL 2 TIMES DAILY
Qty: 60 TABLET | Refills: 0 | Status: SHIPPED | OUTPATIENT
Start: 2024-01-10 | End: 2024-02-12 | Stop reason: SDUPTHER

## 2024-01-23 DIAGNOSIS — G44.221 CHRONIC TENSION-TYPE HEADACHE, INTRACTABLE: ICD-10-CM

## 2024-01-23 RX ORDER — TIZANIDINE 2 MG/1
2 TABLET ORAL DAILY
Qty: 90 TABLET | Refills: 0 | Status: SHIPPED | OUTPATIENT
Start: 2024-01-23 | End: 2024-02-12 | Stop reason: SDUPTHER

## 2024-01-24 DIAGNOSIS — E55.9 VITAMIN D DEFICIENCY: ICD-10-CM

## 2024-01-27 DIAGNOSIS — E11.9 TYPE 2 DIABETES MELLITUS WITHOUT COMPLICATION, WITHOUT LONG-TERM CURRENT USE OF INSULIN (MULTI): ICD-10-CM

## 2024-01-27 DIAGNOSIS — I63.9 CEREBRAL INFARCTION, UNSPECIFIED (MULTI): ICD-10-CM

## 2024-01-27 DIAGNOSIS — E11.49 TYPE 2 DIABETES MELLITUS WITH OTHER DIABETIC NEUROLOGICAL COMPLICATION (MULTI): ICD-10-CM

## 2024-01-29 RX ORDER — BLOOD SUGAR DIAGNOSTIC
STRIP MISCELLANEOUS
Qty: 200 STRIP | Refills: 2 | Status: SHIPPED | OUTPATIENT
Start: 2024-01-29 | End: 2024-02-01

## 2024-01-30 ENCOUNTER — LAB (OUTPATIENT)
Dept: LAB | Facility: LAB | Age: 89
End: 2024-01-30
Payer: MEDICARE

## 2024-01-30 DIAGNOSIS — D64.9 ANEMIA, UNSPECIFIED TYPE: ICD-10-CM

## 2024-01-30 DIAGNOSIS — E03.9 HYPOTHYROIDISM, UNSPECIFIED TYPE: ICD-10-CM

## 2024-01-30 DIAGNOSIS — E55.9 VITAMIN D DEFICIENCY: ICD-10-CM

## 2024-01-30 DIAGNOSIS — N18.9 CHRONIC KIDNEY DISEASE, UNSPECIFIED CKD STAGE: ICD-10-CM

## 2024-01-30 LAB
ANION GAP SERPL CALC-SCNC: 18 MMOL/L (ref 10–20)
BASOPHILS # BLD AUTO: 0.09 X10*3/UL (ref 0–0.1)
BASOPHILS NFR BLD AUTO: 0.9 %
BUN SERPL-MCNC: 21 MG/DL (ref 6–23)
CALCIUM SERPL-MCNC: 9.6 MG/DL (ref 8.6–10.3)
CHLORIDE SERPL-SCNC: 106 MMOL/L (ref 98–107)
CO2 SERPL-SCNC: 23 MMOL/L (ref 21–32)
CREAT SERPL-MCNC: 1.22 MG/DL (ref 0.5–1.05)
EGFRCR SERPLBLD CKD-EPI 2021: 43 ML/MIN/1.73M*2
EOSINOPHIL # BLD AUTO: 0.31 X10*3/UL (ref 0–0.4)
EOSINOPHIL NFR BLD AUTO: 3.1 %
ERYTHROCYTE [DISTWIDTH] IN BLOOD BY AUTOMATED COUNT: 13.6 % (ref 11.5–14.5)
GLUCOSE SERPL-MCNC: 45 MG/DL (ref 74–99)
HCT VFR BLD AUTO: 38.5 % (ref 36–46)
HGB BLD-MCNC: 11.5 G/DL (ref 12–16)
IMM GRANULOCYTES # BLD AUTO: 0.03 X10*3/UL (ref 0–0.5)
IMM GRANULOCYTES NFR BLD AUTO: 0.3 % (ref 0–0.9)
LYMPHOCYTES # BLD AUTO: 2.49 X10*3/UL (ref 0.8–3)
LYMPHOCYTES NFR BLD AUTO: 25.2 %
MCH RBC QN AUTO: 29.8 PG (ref 26–34)
MCHC RBC AUTO-ENTMCNC: 29.9 G/DL (ref 32–36)
MCV RBC AUTO: 100 FL (ref 80–100)
MONOCYTES # BLD AUTO: 0.64 X10*3/UL (ref 0.05–0.8)
MONOCYTES NFR BLD AUTO: 6.5 %
NEUTROPHILS # BLD AUTO: 6.34 X10*3/UL (ref 1.6–5.5)
NEUTROPHILS NFR BLD AUTO: 64 %
NRBC BLD-RTO: 0 /100 WBCS (ref 0–0)
PLATELET # BLD AUTO: 241 X10*3/UL (ref 150–450)
POTASSIUM SERPL-SCNC: 4.6 MMOL/L (ref 3.5–5.3)
RBC # BLD AUTO: 3.86 X10*6/UL (ref 4–5.2)
SODIUM SERPL-SCNC: 142 MMOL/L (ref 136–145)
T4 FREE SERPL-MCNC: 1.85 NG/DL (ref 0.61–1.12)
TSH SERPL-ACNC: 0.04 MIU/L (ref 0.44–3.98)
WBC # BLD AUTO: 9.9 X10*3/UL (ref 4.4–11.3)

## 2024-01-30 PROCEDURE — 84439 ASSAY OF FREE THYROXINE: CPT

## 2024-01-30 PROCEDURE — 80048 BASIC METABOLIC PNL TOTAL CA: CPT

## 2024-01-30 PROCEDURE — 82306 VITAMIN D 25 HYDROXY: CPT

## 2024-01-30 PROCEDURE — 85025 COMPLETE CBC W/AUTO DIFF WBC: CPT

## 2024-01-30 PROCEDURE — 36415 COLL VENOUS BLD VENIPUNCTURE: CPT

## 2024-01-30 PROCEDURE — 84443 ASSAY THYROID STIM HORMONE: CPT

## 2024-01-31 LAB — 25(OH)D3 SERPL-MCNC: 89 NG/ML (ref 30–100)

## 2024-01-31 RX ORDER — CLOPIDOGREL BISULFATE 75 MG/1
75 TABLET ORAL DAILY
Qty: 90 TABLET | Refills: 0 | Status: SHIPPED | OUTPATIENT
Start: 2024-01-31 | End: 2024-02-12 | Stop reason: SDUPTHER

## 2024-01-31 RX ORDER — GLIPIZIDE 5 MG/1
5 TABLET ORAL
Qty: 180 TABLET | Refills: 1 | OUTPATIENT
Start: 2024-01-31

## 2024-02-01 DIAGNOSIS — E11.9 TYPE 2 DIABETES MELLITUS WITHOUT COMPLICATION, WITHOUT LONG-TERM CURRENT USE OF INSULIN (MULTI): ICD-10-CM

## 2024-02-01 RX ORDER — CALCIUM CITRATE/VITAMIN D3 200MG-6.25
1 TABLET ORAL 3 TIMES DAILY
Qty: 200 STRIP | Refills: 3 | Status: SHIPPED | OUTPATIENT
Start: 2024-02-01

## 2024-02-01 RX ORDER — LANCETS 26 GAUGE
EACH MISCELLANEOUS
Qty: 1 EACH | Refills: 3 | Status: SHIPPED | OUTPATIENT
Start: 2024-02-01 | End: 2025-01-31

## 2024-02-01 RX ORDER — INSULIN PUMP SYRINGE, 3 ML
EACH MISCELLANEOUS
Qty: 1 EACH | Refills: 0 | Status: SHIPPED | OUTPATIENT
Start: 2024-02-01 | End: 2025-01-31

## 2024-02-01 NOTE — PROGRESS NOTES
Pharmacy called requesting these items be ordered as they will only be supplying these items starting in April.

## 2024-02-09 PROBLEM — Z86.39 HISTORY OF HYPERCHOLESTEROLEMIA: Status: ACTIVE | Noted: 2024-02-09

## 2024-02-09 PROBLEM — M17.12 OSTEOARTHRITIS OF LEFT KNEE: Status: ACTIVE | Noted: 2024-02-09

## 2024-02-09 PROBLEM — T38.0X5A ADVERSE EFFECT OF CORTICOSTEROIDS: Status: ACTIVE | Noted: 2024-02-09

## 2024-02-09 PROBLEM — R51.9 HEADACHE DISORDER: Status: ACTIVE | Noted: 2023-05-20

## 2024-02-09 PROBLEM — R06.09 DYSPNEA ON EXERTION: Status: ACTIVE | Noted: 2024-02-09

## 2024-02-09 PROBLEM — R60.0 LOCALIZED EDEMA: Status: ACTIVE | Noted: 2024-02-09

## 2024-02-09 PROBLEM — R07.89 CHEST DISCOMFORT: Status: ACTIVE | Noted: 2024-02-09

## 2024-02-09 PROBLEM — M53.3 SACRAL BACK PAIN: Status: ACTIVE | Noted: 2024-02-09

## 2024-02-09 PROBLEM — L08.9 INFECTION OF SKIN: Status: ACTIVE | Noted: 2024-02-09

## 2024-02-09 PROBLEM — B35.1 ONYCHOMYCOSIS OF TOENAIL: Status: ACTIVE | Noted: 2024-02-09

## 2024-02-09 PROBLEM — K57.92 DIVERTICULITIS OF INTESTINE: Status: ACTIVE | Noted: 2024-02-09

## 2024-02-09 PROBLEM — M79.603 PAIN IN UPPER LIMB: Status: ACTIVE | Noted: 2024-02-09

## 2024-02-09 PROBLEM — F41.8 MIXED ANXIETY DEPRESSIVE DISORDER: Status: ACTIVE | Noted: 2020-09-21

## 2024-02-12 ENCOUNTER — OFFICE VISIT (OUTPATIENT)
Dept: PRIMARY CARE | Facility: CLINIC | Age: 89
End: 2024-02-12
Payer: MEDICARE

## 2024-02-12 ENCOUNTER — HOSPITAL ENCOUNTER (INPATIENT)
Facility: HOSPITAL | Age: 89
LOS: 3 days | Discharge: NURSING, REHAB OR LONG-TERM CARE | DRG: 302 | End: 2024-02-15
Attending: EMERGENCY MEDICINE | Admitting: NURSE PRACTITIONER
Payer: MEDICARE

## 2024-02-12 ENCOUNTER — APPOINTMENT (OUTPATIENT)
Dept: RADIOLOGY | Facility: HOSPITAL | Age: 89
DRG: 302 | End: 2024-02-12
Payer: MEDICARE

## 2024-02-12 ENCOUNTER — APPOINTMENT (OUTPATIENT)
Dept: CARDIOLOGY | Facility: HOSPITAL | Age: 89
DRG: 302 | End: 2024-02-12
Payer: MEDICARE

## 2024-02-12 VITALS
HEART RATE: 71 BPM | BODY MASS INDEX: 21.55 KG/M2 | HEIGHT: 66 IN | SYSTOLIC BLOOD PRESSURE: 123 MMHG | DIASTOLIC BLOOD PRESSURE: 73 MMHG | WEIGHT: 134.1 LBS | RESPIRATION RATE: 16 BRPM | OXYGEN SATURATION: 97 %

## 2024-02-12 DIAGNOSIS — E11.9 TYPE 2 DIABETES MELLITUS WITHOUT COMPLICATION, WITHOUT LONG-TERM CURRENT USE OF INSULIN (MULTI): ICD-10-CM

## 2024-02-12 DIAGNOSIS — N18.32 STAGE 3B CHRONIC KIDNEY DISEASE (MULTI): ICD-10-CM

## 2024-02-12 DIAGNOSIS — D64.9 ANEMIA, UNSPECIFIED: ICD-10-CM

## 2024-02-12 DIAGNOSIS — I11.9 HYPERTENSIVE HEART DISEASE WITHOUT HEART FAILURE: ICD-10-CM

## 2024-02-12 DIAGNOSIS — F32.0 CURRENT MILD EPISODE OF MAJOR DEPRESSIVE DISORDER WITHOUT PRIOR EPISODE (CMS-HCC): ICD-10-CM

## 2024-02-12 DIAGNOSIS — I25.118 CORONARY ARTERY DISEASE OF NATIVE ARTERY OF NATIVE HEART WITH STABLE ANGINA PECTORIS (CMS-HCC): ICD-10-CM

## 2024-02-12 DIAGNOSIS — I10 ESSENTIAL (PRIMARY) HYPERTENSION: ICD-10-CM

## 2024-02-12 DIAGNOSIS — R53.83 FATIGUE, UNSPECIFIED TYPE: ICD-10-CM

## 2024-02-12 DIAGNOSIS — E03.9 HYPOTHYROIDISM, UNSPECIFIED: ICD-10-CM

## 2024-02-12 DIAGNOSIS — I50.9 HEART FAILURE, UNSPECIFIED HF CHRONICITY, UNSPECIFIED HEART FAILURE TYPE (MULTI): ICD-10-CM

## 2024-02-12 DIAGNOSIS — I63.9 CEREBRAL INFARCTION, UNSPECIFIED (MULTI): ICD-10-CM

## 2024-02-12 DIAGNOSIS — R07.9 ACUTE CHEST PAIN: Primary | ICD-10-CM

## 2024-02-12 DIAGNOSIS — F03.90 DEMENTIA WITHOUT BEHAVIORAL DISTURBANCE (MULTI): ICD-10-CM

## 2024-02-12 DIAGNOSIS — G40.219 PARTIAL EPILEPSY WITH IMPAIRMENT OF CONSCIOUSNESS, INTRACTABLE (MULTI): ICD-10-CM

## 2024-02-12 DIAGNOSIS — E87.6 HYPOKALEMIA: ICD-10-CM

## 2024-02-12 DIAGNOSIS — G83.21: ICD-10-CM

## 2024-02-12 DIAGNOSIS — Z79.4 TYPE 2 DIABETES MELLITUS WITH OTHER SPECIFIED COMPLICATION, WITH LONG-TERM CURRENT USE OF INSULIN (MULTI): ICD-10-CM

## 2024-02-12 DIAGNOSIS — R64 CACHEXIA (MULTI): Primary | ICD-10-CM

## 2024-02-12 DIAGNOSIS — I25.10 ATHEROSCLEROTIC HEART DISEASE OF NATIVE CORONARY ARTERY WITHOUT ANGINA PECTORIS: ICD-10-CM

## 2024-02-12 DIAGNOSIS — E11.69 TYPE 2 DIABETES MELLITUS WITH OTHER SPECIFIED COMPLICATION, WITH LONG-TERM CURRENT USE OF INSULIN (MULTI): ICD-10-CM

## 2024-02-12 DIAGNOSIS — E78.5 HYPERLIPIDEMIA, UNSPECIFIED: ICD-10-CM

## 2024-02-12 DIAGNOSIS — M31.6 TEMPORAL ARTERITIS (MULTI): ICD-10-CM

## 2024-02-12 DIAGNOSIS — U07.1 COVID: ICD-10-CM

## 2024-02-12 DIAGNOSIS — G44.221 CHRONIC TENSION-TYPE HEADACHE, INTRACTABLE: ICD-10-CM

## 2024-02-12 DIAGNOSIS — E55.9 VITAMIN D DEFICIENCY: ICD-10-CM

## 2024-02-12 LAB
ALBUMIN SERPL BCP-MCNC: 4.4 G/DL (ref 3.4–5)
ALP SERPL-CCNC: 88 U/L (ref 33–136)
ALT SERPL W P-5'-P-CCNC: 11 U/L (ref 7–45)
ANION GAP SERPL CALC-SCNC: 16 MMOL/L (ref 10–20)
AST SERPL W P-5'-P-CCNC: 16 U/L (ref 9–39)
ATRIAL RATE: 54 BPM
BASOPHILS # BLD AUTO: 0.1 X10*3/UL (ref 0–0.1)
BASOPHILS NFR BLD AUTO: 0.9 %
BILIRUB SERPL-MCNC: 0.9 MG/DL (ref 0–1.2)
BUN SERPL-MCNC: 24 MG/DL (ref 6–23)
CALCIUM SERPL-MCNC: 10 MG/DL (ref 8.6–10.3)
CARDIAC TROPONIN I PNL SERPL HS: 9 NG/L (ref 0–13)
CARDIAC TROPONIN I PNL SERPL HS: 9 NG/L (ref 0–13)
CHLORIDE SERPL-SCNC: 107 MMOL/L (ref 98–107)
CO2 SERPL-SCNC: 19 MMOL/L (ref 21–32)
CREAT SERPL-MCNC: 1.4 MG/DL (ref 0.5–1.05)
D DIMER PPP FEU-MCNC: 822 NG/ML FEU
EGFRCR SERPLBLD CKD-EPI 2021: 37 ML/MIN/1.73M*2
EOSINOPHIL # BLD AUTO: 0.27 X10*3/UL (ref 0–0.4)
EOSINOPHIL NFR BLD AUTO: 2.4 %
ERYTHROCYTE [DISTWIDTH] IN BLOOD BY AUTOMATED COUNT: 13.5 % (ref 11.5–14.5)
FLUAV RNA RESP QL NAA+PROBE: NOT DETECTED
FLUBV RNA RESP QL NAA+PROBE: NOT DETECTED
GLUCOSE SERPL-MCNC: 104 MG/DL (ref 74–99)
HCT VFR BLD AUTO: 34 % (ref 36–46)
HGB BLD-MCNC: 11.2 G/DL (ref 12–16)
IMM GRANULOCYTES # BLD AUTO: 0.05 X10*3/UL (ref 0–0.5)
IMM GRANULOCYTES NFR BLD AUTO: 0.5 % (ref 0–0.9)
LYMPHOCYTES # BLD AUTO: 3.03 X10*3/UL (ref 0.8–3)
LYMPHOCYTES NFR BLD AUTO: 27.3 %
MCH RBC QN AUTO: 29.5 PG (ref 26–34)
MCHC RBC AUTO-ENTMCNC: 32.9 G/DL (ref 32–36)
MCV RBC AUTO: 90 FL (ref 80–100)
MONOCYTES # BLD AUTO: 0.78 X10*3/UL (ref 0.05–0.8)
MONOCYTES NFR BLD AUTO: 7 %
NEUTROPHILS # BLD AUTO: 6.85 X10*3/UL (ref 1.6–5.5)
NEUTROPHILS NFR BLD AUTO: 61.9 %
NRBC BLD-RTO: 0 /100 WBCS (ref 0–0)
P AXIS: 19 DEGREES
P OFFSET: 175 MS
P ONSET: 122 MS
PLATELET # BLD AUTO: 249 X10*3/UL (ref 150–450)
POC HEMOGLOBIN A1C: 6.6 % (ref 4.2–6.5)
POTASSIUM SERPL-SCNC: 4.2 MMOL/L (ref 3.5–5.3)
PR INTERVAL: 204 MS
PROT SERPL-MCNC: 6.7 G/DL (ref 6.4–8.2)
Q ONSET: 224 MS
QRS COUNT: 9 BEATS
QRS DURATION: 84 MS
QT INTERVAL: 440 MS
QTC CALCULATION(BAZETT): 417 MS
QTC FREDERICIA: 424 MS
R AXIS: 26 DEGREES
RBC # BLD AUTO: 3.8 X10*6/UL (ref 4–5.2)
SARS-COV-2 RNA RESP QL NAA+PROBE: NOT DETECTED
SODIUM SERPL-SCNC: 138 MMOL/L (ref 136–145)
T AXIS: 39 DEGREES
T OFFSET: 444 MS
VENTRICULAR RATE: 54 BPM
WBC # BLD AUTO: 11.1 X10*3/UL (ref 4.4–11.3)

## 2024-02-12 PROCEDURE — 71045 X-RAY EXAM CHEST 1 VIEW: CPT

## 2024-02-12 PROCEDURE — 2020000001 HC ICU ROOM DAILY: Mod: IPSPLIT

## 2024-02-12 PROCEDURE — 85379 FIBRIN DEGRADATION QUANT: CPT | Performed by: EMERGENCY MEDICINE

## 2024-02-12 PROCEDURE — 99214 OFFICE O/P EST MOD 30 MIN: CPT | Performed by: INTERNAL MEDICINE

## 2024-02-12 PROCEDURE — 71045 X-RAY EXAM CHEST 1 VIEW: CPT | Performed by: RADIOLOGY

## 2024-02-12 PROCEDURE — 1160F RVW MEDS BY RX/DR IN RCRD: CPT | Performed by: INTERNAL MEDICINE

## 2024-02-12 PROCEDURE — 1157F ADVNC CARE PLAN IN RCRD: CPT | Performed by: INTERNAL MEDICINE

## 2024-02-12 PROCEDURE — 1126F AMNT PAIN NOTED NONE PRSNT: CPT | Performed by: INTERNAL MEDICINE

## 2024-02-12 PROCEDURE — 84484 ASSAY OF TROPONIN QUANT: CPT | Performed by: EMERGENCY MEDICINE

## 2024-02-12 PROCEDURE — 87636 SARSCOV2 & INF A&B AMP PRB: CPT | Performed by: EMERGENCY MEDICINE

## 2024-02-12 PROCEDURE — 80053 COMPREHEN METABOLIC PANEL: CPT | Performed by: EMERGENCY MEDICINE

## 2024-02-12 PROCEDURE — 1159F MED LIST DOCD IN RCRD: CPT | Performed by: INTERNAL MEDICINE

## 2024-02-12 PROCEDURE — 99285 EMERGENCY DEPT VISIT HI MDM: CPT | Mod: 25 | Performed by: EMERGENCY MEDICINE

## 2024-02-12 PROCEDURE — 3074F SYST BP LT 130 MM HG: CPT | Performed by: INTERNAL MEDICINE

## 2024-02-12 PROCEDURE — 2500000004 HC RX 250 GENERAL PHARMACY W/ HCPCS (ALT 636 FOR OP/ED): Mod: IPSPLIT | Performed by: NURSE PRACTITIONER

## 2024-02-12 PROCEDURE — 83036 HEMOGLOBIN GLYCOSYLATED A1C: CPT | Performed by: INTERNAL MEDICINE

## 2024-02-12 PROCEDURE — 85025 COMPLETE CBC W/AUTO DIFF WBC: CPT | Performed by: EMERGENCY MEDICINE

## 2024-02-12 PROCEDURE — 1036F TOBACCO NON-USER: CPT | Performed by: INTERNAL MEDICINE

## 2024-02-12 PROCEDURE — 36415 COLL VENOUS BLD VENIPUNCTURE: CPT | Performed by: EMERGENCY MEDICINE

## 2024-02-12 PROCEDURE — 93005 ELECTROCARDIOGRAM TRACING: CPT

## 2024-02-12 PROCEDURE — 3078F DIAST BP <80 MM HG: CPT | Performed by: INTERNAL MEDICINE

## 2024-02-12 PROCEDURE — 2500000001 HC RX 250 WO HCPCS SELF ADMINISTERED DRUGS (ALT 637 FOR MEDICARE OP): Performed by: EMERGENCY MEDICINE

## 2024-02-12 RX ORDER — TIZANIDINE 2 MG/1
2 TABLET ORAL DAILY
Qty: 90 TABLET | Refills: 0 | Status: SHIPPED | OUTPATIENT
Start: 2024-02-12 | End: 2024-05-12

## 2024-02-12 RX ORDER — ERGOCALCIFEROL 1.25 MG/1
1 CAPSULE ORAL
Qty: 12 CAPSULE | Refills: 0 | Status: SHIPPED | OUTPATIENT
Start: 2024-02-12

## 2024-02-12 RX ORDER — ENOXAPARIN SODIUM 100 MG/ML
30 INJECTION SUBCUTANEOUS EVERY 24 HOURS
Status: DISCONTINUED | OUTPATIENT
Start: 2024-02-12 | End: 2024-02-16 | Stop reason: HOSPADM

## 2024-02-12 RX ORDER — ATORVASTATIN CALCIUM 80 MG/1
80 TABLET, FILM COATED ORAL NIGHTLY
Qty: 90 TABLET | Refills: 0 | Status: SHIPPED | OUTPATIENT
Start: 2024-02-12 | End: 2024-04-01

## 2024-02-12 RX ORDER — FERROUS SULFATE 325(65) MG
1 TABLET ORAL DAILY
Qty: 90 TABLET | Refills: 0 | Status: SHIPPED | OUTPATIENT
Start: 2024-02-12 | End: 2024-02-16

## 2024-02-12 RX ORDER — NAPROXEN SODIUM 220 MG/1
81 TABLET, FILM COATED ORAL DAILY
Status: DISCONTINUED | OUTPATIENT
Start: 2024-02-13 | End: 2024-02-13

## 2024-02-12 RX ORDER — LANOLIN ALCOHOL/MO/W.PET/CERES
1000 CREAM (GRAM) TOPICAL DAILY
Qty: 90 TABLET | Refills: 1 | Status: SHIPPED | OUTPATIENT
Start: 2024-02-12

## 2024-02-12 RX ORDER — NITROGLYCERIN 0.4 MG/1
0.4 TABLET SUBLINGUAL EVERY 5 MIN PRN
Status: DISCONTINUED | OUTPATIENT
Start: 2024-02-12 | End: 2024-02-16 | Stop reason: HOSPADM

## 2024-02-12 RX ORDER — NAPROXEN SODIUM 220 MG/1
324 TABLET, FILM COATED ORAL ONCE
Status: COMPLETED | OUTPATIENT
Start: 2024-02-12 | End: 2024-02-12

## 2024-02-12 RX ORDER — METOPROLOL TARTRATE 25 MG/1
25 TABLET, FILM COATED ORAL EVERY 12 HOURS SCHEDULED
Status: DISCONTINUED | OUTPATIENT
Start: 2024-02-12 | End: 2024-02-14

## 2024-02-12 RX ORDER — METOPROLOL TARTRATE 25 MG/1
25 TABLET, FILM COATED ORAL 2 TIMES DAILY
Qty: 180 TABLET | Refills: 0 | Status: SHIPPED | OUTPATIENT
Start: 2024-02-12 | End: 2024-02-15 | Stop reason: HOSPADM

## 2024-02-12 RX ORDER — ATORVASTATIN CALCIUM 40 MG/1
80 TABLET, FILM COATED ORAL NIGHTLY
Status: DISCONTINUED | OUTPATIENT
Start: 2024-02-12 | End: 2024-02-13

## 2024-02-12 RX ORDER — CLOPIDOGREL BISULFATE 75 MG/1
75 TABLET ORAL DAILY
Qty: 90 TABLET | Refills: 0 | Status: SHIPPED | OUTPATIENT
Start: 2024-02-12

## 2024-02-12 RX ORDER — LEVOTHYROXINE SODIUM 150 UG/1
150 TABLET ORAL DAILY
Qty: 90 TABLET | Refills: 1 | Status: SHIPPED | OUTPATIENT
Start: 2024-02-12 | End: 2024-04-22 | Stop reason: DRUGHIGH

## 2024-02-12 RX ORDER — LOSARTAN POTASSIUM 25 MG/1
25 TABLET ORAL DAILY
Qty: 90 TABLET | Refills: 0 | Status: SHIPPED | OUTPATIENT
Start: 2024-02-12 | End: 2024-02-15 | Stop reason: HOSPADM

## 2024-02-12 RX ORDER — INSULIN DEGLUDEC 100 U/ML
14 INJECTION, SOLUTION SUBCUTANEOUS EVERY MORNING
Qty: 12 ML | Refills: 1 | Status: SHIPPED | OUTPATIENT
Start: 2024-02-12

## 2024-02-12 RX ORDER — POTASSIUM CHLORIDE 20 MEQ/1
20 TABLET, EXTENDED RELEASE ORAL DAILY
Qty: 90 TABLET | Refills: 1 | Status: SHIPPED | OUTPATIENT
Start: 2024-02-12 | End: 2024-08-10

## 2024-02-12 RX ADMIN — NITROGLYCERIN 0.5 INCH: 20 OINTMENT TOPICAL at 21:20

## 2024-02-12 RX ADMIN — ASPIRIN 81 MG CHEWABLE TABLET 324 MG: 81 TABLET CHEWABLE at 17:14

## 2024-02-12 RX ADMIN — ENOXAPARIN SODIUM 30 MG: 30 INJECTION SUBCUTANEOUS at 22:14

## 2024-02-12 RX ADMIN — NITROGLYCERIN 0.5 INCH: 20 OINTMENT TOPICAL at 17:15

## 2024-02-12 SDOH — SOCIAL STABILITY: SOCIAL INSECURITY: DO YOU FEEL ANYONE HAS EXPLOITED OR TAKEN ADVANTAGE OF YOU FINANCIALLY OR OF YOUR PERSONAL PROPERTY?: NO

## 2024-02-12 SDOH — SOCIAL STABILITY: SOCIAL INSECURITY: DO YOU FEEL UNSAFE GOING BACK TO THE PLACE WHERE YOU ARE LIVING?: NO

## 2024-02-12 SDOH — SOCIAL STABILITY: SOCIAL INSECURITY: ARE YOU OR HAVE YOU BEEN THREATENED OR ABUSED PHYSICALLY, EMOTIONALLY, OR SEXUALLY BY ANYONE?: NO

## 2024-02-12 SDOH — SOCIAL STABILITY: SOCIAL INSECURITY: HAVE YOU HAD THOUGHTS OF HARMING ANYONE ELSE?: NO

## 2024-02-12 SDOH — SOCIAL STABILITY: SOCIAL INSECURITY: DOES ANYONE TRY TO KEEP YOU FROM HAVING/CONTACTING OTHER FRIENDS OR DOING THINGS OUTSIDE YOUR HOME?: NO

## 2024-02-12 SDOH — SOCIAL STABILITY: SOCIAL INSECURITY: WERE YOU ABLE TO COMPLETE ALL THE BEHAVIORAL HEALTH SCREENINGS?: YES

## 2024-02-12 SDOH — SOCIAL STABILITY: SOCIAL INSECURITY: ARE THERE ANY APPARENT SIGNS OF INJURIES/BEHAVIORS THAT COULD BE RELATED TO ABUSE/NEGLECT?: NO

## 2024-02-12 SDOH — SOCIAL STABILITY: SOCIAL INSECURITY: HAS ANYONE EVER THREATENED TO HURT YOUR FAMILY OR YOUR PETS?: NO

## 2024-02-12 SDOH — SOCIAL STABILITY: SOCIAL INSECURITY: ABUSE: ADULT

## 2024-02-12 ASSESSMENT — PATIENT HEALTH QUESTIONNAIRE - PHQ9
2. FEELING DOWN, DEPRESSED OR HOPELESS: NOT AT ALL
SUM OF ALL RESPONSES TO PHQ9 QUESTIONS 1 & 2: 0
1. LITTLE INTEREST OR PLEASURE IN DOING THINGS: NOT AT ALL
2. FEELING DOWN, DEPRESSED OR HOPELESS: NOT AT ALL
SUM OF ALL RESPONSES TO PHQ9 QUESTIONS 1 AND 2: 0
1. LITTLE INTEREST OR PLEASURE IN DOING THINGS: NOT AT ALL

## 2024-02-12 ASSESSMENT — COGNITIVE AND FUNCTIONAL STATUS - GENERAL
HELP NEEDED FOR BATHING: A LITTLE
DAILY ACTIVITIY SCORE: 21
MOVING TO AND FROM BED TO CHAIR: A LITTLE
WALKING IN HOSPITAL ROOM: A LITTLE
DRESSING REGULAR UPPER BODY CLOTHING: A LITTLE
CLIMB 3 TO 5 STEPS WITH RAILING: A LITTLE
MOBILITY SCORE: 20
DRESSING REGULAR LOWER BODY CLOTHING: A LITTLE
PATIENT BASELINE BEDBOUND: NO
STANDING UP FROM CHAIR USING ARMS: A LITTLE

## 2024-02-12 ASSESSMENT — ACTIVITIES OF DAILY LIVING (ADL)
FEEDING YOURSELF: INDEPENDENT
HEARING - LEFT EAR: DIFFICULTY WITH NOISE
BATHING: NEEDS ASSISTANCE
HEARING - RIGHT EAR: DIFFICULTY WITH NOISE
WALKS IN HOME: NEEDS ASSISTANCE
LACK_OF_TRANSPORTATION: NO
JUDGMENT_ADEQUATE_SAFELY_COMPLETE_DAILY_ACTIVITIES: YES
PATIENT'S MEMORY ADEQUATE TO SAFELY COMPLETE DAILY ACTIVITIES?: YES
TOILETING: INDEPENDENT
ADEQUATE_TO_COMPLETE_ADL: YES
BATHING: NEEDS ASSISTANCE
ADEQUATE_TO_COMPLETE_ADL: YES
FEEDING YOURSELF: INDEPENDENT
GROOMING: INDEPENDENT
DRESSING YOURSELF: NEEDS ASSISTANCE
ASSISTIVE_DEVICE: WALKER
PATIENT'S MEMORY ADEQUATE TO SAFELY COMPLETE DAILY ACTIVITIES?: YES
TOILETING: INDEPENDENT
HEARING - LEFT EAR: DIFFICULTY WITH NOISE
WALKS IN HOME: NEEDS ASSISTANCE
DRESSING YOURSELF: NEEDS ASSISTANCE
ASSISTIVE_DEVICE: WALKER
JUDGMENT_ADEQUATE_SAFELY_COMPLETE_DAILY_ACTIVITIES: YES
GROOMING: INDEPENDENT
HEARING - RIGHT EAR: DIFFICULTY WITH NOISE

## 2024-02-12 ASSESSMENT — COLUMBIA-SUICIDE SEVERITY RATING SCALE - C-SSRS
2. HAVE YOU ACTUALLY HAD ANY THOUGHTS OF KILLING YOURSELF?: NO
1. IN THE PAST MONTH, HAVE YOU WISHED YOU WERE DEAD OR WISHED YOU COULD GO TO SLEEP AND NOT WAKE UP?: NO
2. HAVE YOU ACTUALLY HAD ANY THOUGHTS OF KILLING YOURSELF?: NO
1. IN THE PAST MONTH, HAVE YOU WISHED YOU WERE DEAD OR WISHED YOU COULD GO TO SLEEP AND NOT WAKE UP?: NO
6. HAVE YOU EVER DONE ANYTHING, STARTED TO DO ANYTHING, OR PREPARED TO DO ANYTHING TO END YOUR LIFE?: NO
6. HAVE YOU EVER DONE ANYTHING, STARTED TO DO ANYTHING, OR PREPARED TO DO ANYTHING TO END YOUR LIFE?: NO

## 2024-02-12 ASSESSMENT — PAIN SCALES - GENERAL
PAINLEVEL: 0-NO PAIN
PAINLEVEL_OUTOF10: 0 - NO PAIN
PAINLEVEL_OUTOF10: 5 - MODERATE PAIN

## 2024-02-12 ASSESSMENT — ENCOUNTER SYMPTOMS
ROS GI COMMENTS: BLACK STOOLS
SHORTNESS OF BREATH: 1
NECK PAIN: 1

## 2024-02-12 ASSESSMENT — LIFESTYLE VARIABLES
AUDIT-C TOTAL SCORE: 0
AUDIT-C TOTAL SCORE: 0
HOW MANY STANDARD DRINKS CONTAINING ALCOHOL DO YOU HAVE ON A TYPICAL DAY: PATIENT DOES NOT DRINK
HOW OFTEN DO YOU HAVE A DRINK CONTAINING ALCOHOL: NEVER
HOW OFTEN DO YOU HAVE 6 OR MORE DRINKS ON ONE OCCASION: NEVER
SKIP TO QUESTIONS 9-10: 1

## 2024-02-12 ASSESSMENT — PAIN - FUNCTIONAL ASSESSMENT
PAIN_FUNCTIONAL_ASSESSMENT: 0-10
PAIN_FUNCTIONAL_ASSESSMENT: 0-10

## 2024-02-12 NOTE — ED PROVIDER NOTES
[facility]  ED  Provider Note  No admission date for patient encounter.  Room/bed info not found              History of Present Illness:   Stella A Saturday is a 88 y.o. female presenting to the ED for sharp left-sided chest pain, beginning this afternoon.  The complaint has been resolved, moderate in severity, and worsened by nothing.  Patient had about 30 minutes of sharp left-sided chest pain this afternoon.  The pain is now resolved completely.  She is not short of breath.  She denies any cough.  She has no nausea vomiting or diarrhea.  She denies any rash on the skin.  She denies flank pain.  She has no evidence of urinary tract infection.      Review of Systems:   Pertinent positives and review of systems as noted above.  Remaining 10 review of systems is negative or noncontributory to today's episode of care.  Review of Systems       --------------------------------------------- PAST HISTORY ---------------------------------------------  Past Medical History:  has a past medical history of BMI 21.0-21.9, adult, Chronic kidney disease, stage 3b (CMS/[facility]) (08/22/2022), Other specified diabetes mellitus with hyperosmolarity without nonketotic hyperglycemic-hyperosmolar coma ([facility]) (CMS/[facility]), Personal history of other diseases of the digestive system, Personal history of other diseases of the musculoskeletal system and connective tissue, Personal history of other endocrine, nutritional and metabolic disease, Personal history of other endocrine, nutritional and metabolic disease (02/28/2019), Personal history of other mental and behavioral disorders (08/09/2018), Personal history of other specified conditions (09/22/2022), and Soft tissue disorder, unspecified (10/02/2018).    Past Surgical History:  has a past surgical history that includes Foot surgery (08/28/2017); Gallbladder surgery (08/28/2017); Eye surgery (08/28/2017); Total thyroidectomy (10/11/2017); and Tonsillectomy (10/11/2017).    Social  History:  reports that she has never smoked. She has never used smokeless tobacco. She reports that she does not drink alcohol and does not use drugs.    Family History: family history includes Diabetes in her brother and mother; No Known Problems in her father. Unless otherwise noted, family history is non contributory    Patient's Medications   New Prescriptions    No medications on file   Previous Medications    ACETAMINOPHEN (TYLENOL) 325 MG TABLET    Take by mouth.    AMLODIPINE (NORVASC) 2.5 MG TABLET    Take 1 tablet (2.5 mg) by mouth once daily.    ATORVASTATIN (LIPITOR) 80 MG TABLET    Take 1 tablet (80 mg) by mouth once daily at bedtime.    AUTOLET (ACCU-CHEK FASTCLIX LANCING DEV) LANCING DEVICE    Use as instructed    BLOOD SUGAR DIAGNOSTIC (TRUE METRIX GLUCOSE TEST STRIP) STRIP    1 strip 3 times a day.    CLOPIDOGREL (PLAVIX) 75 MG TABLET    Take 1 tablet (75 mg) by mouth once daily.    COLCHICINE, GOUT, (COLCRYS) 0.6 MG TABLET    0.6 mg DAILY (route: oral)    CYANOCOBALAMIN (VITAMIN B-12) 1,000 MCG TABLET    Take 1 tablet (1,000 mcg) by mouth once daily.    DOCUSATE SODIUM (COLACE) 100 MG CAPSULE    Take by mouth.    ERGOCALCIFEROL (VITAMIN D-2) 1.25 MG (98866 UT) CAPSULE    Take 1 capsule (1,250 mcg) by mouth 1 (one) time per week.    FERROUS SULFATE, 325 MG FERROUS SULFATE, (FEROSUL) TABLET    Take 1 tablet by mouth once daily. as directed    FREESTYLE GLUCOSE MONITORING (TRUE METRIX GLUCOSE METER) KIT    Use as directed    FUROSEMIDE (LASIX) 20 MG TABLET    Take 1 tablet (20 mg) by mouth once daily. As directed    GLUCOSE 4 GRAM CHEWABLE TABLET    8 g AS DIRECTED (route: BY MOUTH)    GUAIFENESIN (MUCINEX) 600 MG 12 HR TABLET    Take by mouth.    IBANDRONATE (BONIVA) 150 MG TABLET    Take by mouth.    INSULIN DEGLUDEC (TRESIBA FLEXTOUCH U-100) 100 UNIT/ML (3 ML) INJECTION    Inject 14 Units under the skin once daily in the morning.    ISOSORBIDE MONONITRATE ER (IMDUR) 30 MG 24 HR TABLET    Take by  "mouth.    LEVETIRACETAM (KEPPRA) 500 MG TABLET    Take by mouth.    LEVOTHYROXINE (SYNTHROID, LEVOXYL) 150 MCG TABLET    Take 1 tablet (150 mcg) by mouth once daily.    LIDOCAINE (LIDODERM) 5 % PATCH    Apply topically.    LOSARTAN (COZAAR) 25 MG TABLET    Take 1 tablet (25 mg) by mouth once daily.    METOPROLOL TARTRATE (LOPRESSOR) 25 MG TABLET    Take 1 tablet (25 mg) by mouth 2 times a day.    OMEGA 3-DHA-EPA-FISH OIL (FISH OIL) 1,000 MG (120 MG-180 MG) CAPSULE    Take by mouth.    OMEGA-3S-DHA-EPA-FISH OIL (SEA-OMEGA) 200 MG-300 MG- 100 MG-1,000 MG CAPSULE    Take by mouth.    PEN NEEDLE, DIABETIC 31 GAUGE X 5/16\" NEEDLE    Use 1 time daily with insulin pen    POTASSIUM CHLORIDE CR 20 MEQ ER TABLET    Take 1 tablet (20 mEq) by mouth once daily. Do not crush, chew, or split.    QUETIAPINE (SEROQUEL) 25 MG TABLET    Take by mouth.    ROSUVASTATIN (CRESTOR) 40 MG TABLET    Take by mouth.    SIMVASTATIN (ZOCOR) 40 MG TABLET    Take by mouth.    TIZANIDINE (ZANAFLEX) 2 MG TABLET    Take 1 tablet (2 mg) by mouth once daily.    TRIAMCINOLONE (KENALOG) 0.1 % OINTMENT    Apply twice daily    TRIAMTERENE-HYDROCHLOROTHIAZID (MAXZIDE-25) 37.5-25 MG TABLET    Take by mouth.   Modified Medications    No medications on file   Discontinued Medications    No medications on file      The patient’s home medications have been reviewed.    Allergies: Metformin, Penicillins, Sulfa (sulfonamide antibiotics), and Allopurinol    -------------------------------------------------- RESULTS -------------------------------------------------  All laboratory and radiology results have been personally reviewed by myself   LABS:  Labs Reviewed   D-DIMER, VTE EXCLUSION - Abnormal       Result Value    D-Dimer, Quantitative VTE Exclusion 822 (*)     Narrative:     The VTE Exclusion D-Dimer assay is reported in ng/mL Fibrinogen Equivalent Units (FEU).    Per 's instructions for use, a value of less than 500 ng/mL (FEU) may help to " exclude DVT or PE in outpatients when the assay is used with a clinical pretest probability assessment.(A[facility] must utilize and document eCalc 'Wells Score Deep Vein Thrombosis Risk' for DVT exclusion only. Emergency Department should utilize  Guidelines for Emergency Department Use of the VTE Exclusion D-Dimer and Clinical Pretest probability assessment model for DVT or PE exclusion.)   COMPREHENSIVE METABOLIC PANEL - Abnormal    Glucose 104 (*)     Sodium 138      Potassium 4.2      Chloride 107      Bicarbonate 19 (*)     Anion Gap 16      Urea Nitrogen 24 (*)     Creatinine 1.40 (*)     eGFR 37 (*)     Calcium 10.0      Albumin 4.4      Alkaline Phosphatase 88      Total Protein 6.7      AST 16      Bilirubin, Total 0.9      ALT 11     CBC WITH AUTO DIFFERENTIAL - Abnormal    WBC 11.1      nRBC 0.0      RBC 3.80 (*)     Hemoglobin 11.2 (*)     Hematocrit 34.0 (*)     MCV 90      MCH 29.5      MCHC 32.9      RDW 13.5      Platelets 249      Neutrophils % 61.9      Immature Granulocytes %, Automated 0.5      Lymphocytes % 27.3      Monocytes % 7.0      Eosinophils % 2.4      Basophils % 0.9      Neutrophils Absolute 6.85 (*)     Immature Granulocytes Absolute, Automated 0.05      Lymphocytes Absolute 3.03 (*)     Monocytes Absolute 0.78      Eosinophils Absolute 0.27      Basophils Absolute 0.10     SERIAL TROPONIN-INITIAL - Normal    Troponin I, High Sensitivity 9      Narrative:     Less than 99th percentile of normal range cutoff-  Female and children under 18 years old <14 ng/L; Male <21 ng/L: Negative  Repeat testing should be performed if clinically indicated.     Female and children under 18 years old 14-50 ng/L; Male 21-50 ng/L:  Consistent with possible cardiac damage and possible increased clinical   risk. Serial measurements may help to assess extent of myocardial damage.     >50 ng/L: Consistent with cardiac damage, increased clinical risk and  myocardial infarction. Serial measurements may help assess  extent of   myocardial damage.      NOTE: Children less than 1 year old may have higher baseline troponin   levels and results should be interpreted in conjunction with the overall   clinical context.     NOTE: Troponin I testing is performed using a different   testing methodology at [facility] than at other   [facility]. Direct result comparisons should only   be made within the same method.   SERIAL TROPONIN, 1 HOUR - Normal    Troponin I, High Sensitivity 9      Narrative:     Less than 99th percentile of normal range cutoff-  Female and children under 18 years old <14 ng/L; Male <21 ng/L: Negative  Repeat testing should be performed if clinically indicated.     Female and children under 18 years old 14-50 ng/L; Male 21-50 ng/L:  Consistent with possible cardiac damage and possible increased clinical   risk. Serial measurements may help to assess extent of myocardial damage.     >50 ng/L: Consistent with cardiac damage, increased clinical risk and  myocardial infarction. Serial measurements may help assess extent of   myocardial damage.      NOTE: Children less than 1 year old may have higher baseline troponin   levels and results should be interpreted in conjunction with the overall   clinical context.     NOTE: Troponin I testing is performed using a different   testing methodology at [facility] than at other   [facility]. Direct result comparisons should only   be made within the same method.   SARS-COV-2 AND INFLUENZA A/B PCR - Normal    Flu A Result Not Detected      Flu B Result Not Detected      Coronavirus 2019, PCR Not Detected      Narrative:     This assay has received FDA Emergency Use Authorization (EUA) and  is only authorized for the duration of time that circumstances exist to justify the authorization of the emergency use of in vitro diagnostic tests for the detection of SARS-CoV-2 virus and/or diagnosis of COVID-19 infection under section 564(b)(1) of the  Act, 21 U.S.C. 360bbb-3(b)(1). Testing for SARS-CoV-2 is only recommended for patients who meet current clinical and/or epidemiological criteria as defined by federal, state, or local public health directives. This assay is an in vitro diagnostic nucleic acid amplification test for the qualitative detection of SARS-CoV-2, Influenza A, and Influenza B from nasopharyngeal specimens and has been validated for use at [facility]. Negative results do not preclude COVID-19 infections or Influenza A/B infections, and should not be used as the sole basis for diagnosis, treatment, or other management decisions. If Influenza A/B and RSV PCR results are negative, testing for Parainfluenza virus, Adenovirus and Metapneumovirus is routinely performed for [facility] pediatric oncology and intensive care inpatients, and is available on other patients by placing an add-on request.    TROPONIN SERIES- (INITIAL, 1 HR)    Narrative:     The following orders were created for panel order Troponin I Series, High Sensitivity (0, 1 HR).  Procedure                               Abnormality         Status                     ---------                               -----------         ------                     Troponin I, High Sensiti...[[id number]]  Normal              Final result               Troponin, High Sensitivi...[[id number]]  Normal              Final result                 Please view results for these tests on the individual orders.       EKG: Sinus bradycardia at 54 bpm, normal axis, normal's, no acute ST elevations, interpreted by SIRENA Santamaria MD    RADIOLOGY:  Interpreted by Radiologist.  XR chest 1 view   Final Result   1.  Low lung volumes with newly seen platelike atelectasis.                  MACRO:   None        Signed by: Manny Davila 2/12/2024 5:47 PM   Dictation workstation:   [id number]          ------------------------- NURSING NOTES AND VITALS REVIEWED ---------------------------   The nursing  notes within the ED encounter and vital signs as below have been reviewed.   There were no vitals taken for this visit.  Oxygen Saturation Interpretation: Normal      ---------------------------------------------------PHYSICAL EXAM--------------------------------------  Physical Exam   Constitutional/General: Alert and oriented x3, well appearing, non toxic in NAD  Head: Normocephalic and atraumatic  Eyes: PERRL, EOMI, conjunctiva normal, sclera non icteric  Mouth: Oropharynx clear, handling secretions, no trismus, no asymmetry of the posterior oropharynx or uvular edema  Neck: Supple, full ROM, non tender to palpation in the midline, no stridor, no crepitus, no meningeal signs  Respiratory: Lungs clear to auscultation bilaterally, no wheezes, rales, or rhonchi  Cardiovascular:  Regular rate. Regular rhythm. No murmurs, gallops, or rubs. 2+ distal pulses  Chest: No chest wall tenderness  GI:  Abdomen Soft, Non tender, Non distended.  +BS. No organomegaly, no palpable masses,  No rebound, guarding, or rigidity. No CVAT   Musculoskeletal: Moves all extremities x 4. Warm and well perfused, no clubbing, cyanosis, or edema. Capillary refill <3 seconds  Integument: skin warm and dry. No rashes.   Lymphatic: no lymphadenopathy noted  Neurologic:  No focal deficits, symmetric strength 5/5 in the upper and lower extremities bilaterally  Psychiatric: Normal Affect    Procedures    ------------------------------ ED COURSE/MEDICAL DECISION MAKING----------------------  ED Clinical Course:  Patient is an 88-year-old woman with sharp left-sided chest pain lasted about 30 minutes earlier today.  She is currently pain-free.  There is no radiation of the pain to her arm jaw or back.  There was no shortness of breath.  The pain worsens with deep breathing.  She denies any recent cough or cold symptoms.  Her EKG shows no acute ST elevations.  Initial troponin testing is normal.  The D-dimer test is elevated but less than 10 times her  age.  She is not hypoxemic or tachycardic.  Chest x-ray shows low lung volumes.  Patient has been treated for chest pain and has been stable during her ED course.      Medical Decision Making:   Patient admitted for monitoring serial enzymes and cardiology consult before disposition.  She is a full code.      Counseling:   The emergency provider has spoken with the patient and discussed today’s results, in addition to providing specific details for the plan of care and counseling regarding the diagnosis and prognosis.  Questions are answered at this time and they are agreeable with the plan.      --------------------------------- IMPRESSION AND DISPOSITION ---------------------------------        IMPRESSION  1. Acute chest pain        DISPOSITION  Disposition: Admit to CCU/ICU  Patient condition is serious      Billing Provider Critical Care Time: 0 minutes     Sorin Santamaria MD  02/12/24 [id number]       Sorin Santamaria MD  02/13/24 [id number]

## 2024-02-12 NOTE — PROGRESS NOTES
Left side sharp pain under breast, comes and goes, shortness of breath at rest and activity. Tremors to bilateral arms and hands new onset

## 2024-02-12 NOTE — PROGRESS NOTES
"Patient ID: She states she has been having episodes of sharp pain in left chest, describes it as stabbing pain. She states she has had two episodes of stabbing left side chest pain since signing in for appointment. She states she is more short of breath lately. She states that she also has pain on left side of her neck. She states that she has very dark, black stool. She states she was hit by car door in head, has small scab. She states that she has pain all over.     ANIBAL Douglas A Saturday is a 88 y.o. female with PMH remarkable for HTN, IDDM-II, hypothyroidism, CKD  who presents to the office today for Shortness of Breath and Head Injury.    HEALTH MAINTENANCE: FOLLOW UP  Smoking: never smoker  Mammogram (40-75): n/a due to age  Labs: 1/30/24  Colonoscopy (45-75):    SOCIAL HISTORY:  Social History     Tobacco Use    Smoking status: Never    Smokeless tobacco: Never   Vaping Use    Vaping Use: Never used   Substance Use Topics    Alcohol use: Never    Drug use: Never     IMMUNIZATIONS:  Immunization History   Administered Date(s) Administered    Influenza, High Dose Seasonal, Preservative Free 10/11/2016, 10/11/2017, 09/11/2018, 09/14/2020, 11/01/2021, 09/22/2022    Influenza, Unspecified 09/18/2013, 10/03/2019    Influenza, seasonal, injectable 10/08/2012    Moderna SARS-CoV-2 Vaccination 01/30/2021, 02/27/2021, 08/30/2022    Pneumococcal polysaccharide vaccine, 23-valent, age 2 years and older (PNEUMOVAX 23) 10/03/2019       Social History     Tobacco Use    Smoking status: Never    Smokeless tobacco: Never   Substance Use Topics    Alcohol use: Never        Review of Systems   Respiratory:  Positive for shortness of breath.    Cardiovascular:  Positive for chest pain.   Gastrointestinal:         Black stools   Genitourinary: Negative.    Musculoskeletal:  Positive for neck pain.       VITAL SIGNS:  Visit Vitals  /73 (BP Location: Left arm)   Pulse 71   Resp 16   Ht 1.676 m (5' 6\")   Wt 60.8 kg (134 lb 1.6 " oz)   SpO2 97%   BMI 21.64 kg/m²   OB Status Postmenopausal   Smoking Status Never   BSA 1.68 m²       ALLERGIES:  Allergies   Allergen Reactions    Metformin Diarrhea    Penicillins Nausea Only and Other     SOB    Sulfa (Sulfonamide Antibiotics) Unknown    Allopurinol Itching and Rash      Physical Exam  Vitals reviewed.   Constitutional:       Appearance: Normal appearance.   HENT:      Head: Normocephalic and atraumatic.   Cardiovascular:      Rate and Rhythm: Normal rate and regular rhythm.      Pulses: Normal pulses.      Heart sounds: Normal heart sounds.   Pulmonary:      Breath sounds: Normal breath sounds.      Comments: Tachypneic with some conversational dyspnea noted  Abdominal:      General: Bowel sounds are normal.      Palpations: Abdomen is soft.   Musculoskeletal:         General: Normal range of motion.   Neurological:      General: No focal deficit present.      Mental Status: She is alert and oriented to person, place, and time.   Psychiatric:         Mood and Affect: Mood normal.         Behavior: Behavior normal.       MEDICATIONS:  Current Outpatient Medications   Medication Instructions    acetaminophen (Tylenol) 325 mg tablet oral    amLODIPine (Norvasc) 2.5 mg tablet 1 tablet, oral, Daily    atorvastatin (LIPITOR) 80 mg, oral, Nightly    Autolet (Accu-Chek FastClix Lancing Dev) lancing device Use as instructed    blood sugar diagnostic (True Metrix Glucose Test Strip) strip 1 strip, miscellaneous, 3 times daily    clopidogrel (PLAVIX) 75 mg, oral, Daily    colchicine, gout, (Colcrys) 0.6 mg tablet 0.6 mg DAILY (route: oral)    cyanocobalamin (VITAMIN B-12) 1,000 mcg, oral, Daily    docusate sodium (Colace) 100 mg capsule oral    ergocalciferol (VITAMIN D-2) 1,250 mcg, oral, Weekly    ferrous sulfate (325 mg ferrous sulfate) (FEROSUL) 325 mg, oral, Daily, as directed    FreeStyle glucose monitoring (True Metrix Glucose Meter) kit Use as directed    furosemide (Lasix) 20 mg tablet 1 tablet,  "oral, Daily, As directed    glucose 4 gram chewable tablet 8 g AS DIRECTED (route: BY MOUTH)    guaiFENesin (Mucinex) 600 mg 12 hr tablet oral    ibandronate (Boniva) 150 mg tablet oral    insulin aspart (NOVOLOG) 14 Units, subcutaneous, 3 times daily before meals    insulin degludec (TRESIBA FLEXTOUCH U-100) 16 Units, subcutaneous, Every morning    isosorbide mononitrate ER (Imdur) 30 mg 24 hr tablet oral    levETIRAcetam (Keppra) 500 mg tablet oral    levothyroxine (SYNTHROID, LEVOXYL) 150 mcg, oral, Daily    lidocaine (Lidoderm) 5 % patch Topical    losartan (COZAAR) 25 mg, oral, Daily    metoprolol tartrate (LOPRESSOR) 25 mg, oral, 2 times daily    omega 3-dha-epa-fish oil (Fish OiL) 1,000 mg (120 mg-180 mg) capsule oral    omega-3s-dha-epa-fish oil (Sea-Omega) 200 mg-300 mg- 100 mg-1,000 mg capsule oral    pen needle, diabetic 31 gauge x 5/16\" needle Use 1 time daily with insulin pen    potassium chloride CR 20 mEq ER tablet 20 mEq, oral, Daily, Do not crush, chew, or split.    QUEtiapine (SEROquel) 25 mg tablet oral    rosuvastatin (Crestor) 40 mg tablet oral    simvastatin (Zocor) 40 mg tablet oral    tiZANidine (ZANAFLEX) 2 mg, oral, Daily    triamcinolone (Kenalog) 0.1 % ointment Apply twice daily    triamterene-hydrochlorothiazid (Maxzide-25) 37.5-25 mg tablet oral        RECENT LABS:  Lab Results   Component Value Date    WBC 9.9 01/30/2024    HGB 11.5 (L) 01/30/2024    HCT 38.5 01/30/2024     01/30/2024    CHOL 105 12/29/2022    TRIG 268 (H) 12/29/2022    HDL 29.6 (A) 12/29/2022    LDLDIRECT 95 01/17/2019    ALT 12 12/29/2022    AST 17 12/29/2022     01/30/2024    K 4.6 01/30/2024     01/30/2024    CREATININE 1.22 (H) 01/30/2024    BUN 21 01/30/2024    CO2 23 01/30/2024    TSH 0.04 (L) 01/30/2024    INR 0.9 08/22/2018    HGBA1C 6.3 08/01/2023       ASSESSMENT AND PLAN:  Assessment/Plan   Diagnoses and all orders for this visit:  Chest pain radiating to left side of neck, associated " with SOB and complaint of black stools  - will send patient to ER for cardiac workup, her nephew who is with her today will transport her to ER    Type 2 diabetes mellitus without complication, without long-term current use of insulin (CMS/[facility])  -     POCT glycosylated hemoglobin (Hb A1C) manually resulted with result of 6.6  -     insulin degludec (Tresiba FlexTouch U-100) 100 unit/mL (3 mL) injection; Inject 14 Units under the skin once daily in the morning.   -     patient's nephew states that she has not been taking humalog insulin, advised to continue to hold this     Coronary artery disease of native artery of native heart with stable angina pectoris (CMS/[facility])/HTN/Heart Failure        -     see above        -     continue with ASA, Plavix, Metoprolol, Losartan, lipid lowering agent    Hyperlipidemia, unspecified  -     atorvastatin (Lipitor) 80 mg tablet; Take 1 tablet (80 mg) by mouth once daily at bedtime.    Cerebral infarction, unspecified (CMS/[facility])  -     c/w Plavix, ASA, lipid lowering agent    Fatigue, unspecified type  -     cyanocobalamin (Vitamin B-12) 1,000 mcg tablet; Take 1 tablet (1,000 mcg) by mouth once daily.    Vitamin D deficiency  -     ergocalciferol (Vitamin D-2) 1.25 MG (31957 UT) capsule; Take 1 capsule (1,250 mcg) by mouth 1 (one) time per week.    Anemia, unspecified  -     ferrous sulfate, 325 mg ferrous sulfate, (FeroSuL) tablet; Take 1 tablet by mouth once daily. as directed  -     see above    Hypothyroidism, unspecified  -     levothyroxine (Synthroid, Levoxyl) 150 mcg tablet; Take 1 tablet (150 mcg) by mouth once daily.    Hypokalemia  -     potassium chloride CR 20 mEq ER tablet; Take 1 tablet (20 mEq) by mouth once daily. Do not crush, chew, or split.    Chronic tension-type headache, intractable  -     tiZANidine (Zanaflex) 2 mg tablet; Take 1 tablet (2 mg) by mouth once daily.    Stage 3b chronic kidney disease (CMS/[facility])  - continue to monitor kidney function          --------------------  Written by Abida Li LPN, acting as a scribe for Dr. Jolley. This note accurately reflects the work and decisions made by Dr. Jolley.     I, Dr. Jolley, attest all medical record entries made by the scribe were under my direction and were personally dictated by me. I have reviewed the chart and agree that the record accurately reflects my performance of the history, physical exam, and assessment and plan.

## 2024-02-13 ENCOUNTER — APPOINTMENT (OUTPATIENT)
Dept: RADIOLOGY | Facility: HOSPITAL | Age: 89
DRG: 302 | End: 2024-02-13
Payer: MEDICARE

## 2024-02-13 ENCOUNTER — APPOINTMENT (OUTPATIENT)
Dept: CARDIOLOGY | Facility: HOSPITAL | Age: 89
DRG: 302 | End: 2024-02-13
Payer: MEDICARE

## 2024-02-13 LAB
ANION GAP SERPL CALC-SCNC: 13 MMOL/L (ref 10–20)
AORTIC VALVE MEAN GRADIENT: 11 MMHG
AORTIC VALVE PEAK VELOCITY: 2.4 M/S
APTT PPP: 33 SECONDS (ref 27–38)
AV PEAK GRADIENT: 23 MMHG
AVA (PEAK VEL): 1.97 CM2
AVA (VTI): 1.95 CM2
BNP SERPL-MCNC: 155 PG/ML (ref 0–99)
BUN SERPL-MCNC: 23 MG/DL (ref 6–23)
CALCIUM SERPL-MCNC: 9 MG/DL (ref 8.6–10.3)
CARDIAC TROPONIN I PNL SERPL HS: 9 NG/L (ref 0–13)
CHLORIDE SERPL-SCNC: 111 MMOL/L (ref 98–107)
CHOLEST SERPL-MCNC: 99 MG/DL (ref 0–199)
CHOLESTEROL/HDL RATIO: 4.5
CO2 SERPL-SCNC: 19 MMOL/L (ref 21–32)
CREAT SERPL-MCNC: 1.3 MG/DL (ref 0.5–1.05)
CRP SERPL-MCNC: 0.14 MG/DL
EGFRCR SERPLBLD CKD-EPI 2021: 40 ML/MIN/1.73M*2
EJECTION FRACTION APICAL 4 CHAMBER: 70
EJECTION FRACTION: 70 %
ERYTHROCYTE [DISTWIDTH] IN BLOOD BY AUTOMATED COUNT: 13.7 % (ref 11.5–14.5)
EST. AVERAGE GLUCOSE BLD GHB EST-MCNC: 128 MG/DL
GLUCOSE BLD MANUAL STRIP-MCNC: 100 MG/DL (ref 74–99)
GLUCOSE BLD MANUAL STRIP-MCNC: 171 MG/DL (ref 74–99)
GLUCOSE BLD MANUAL STRIP-MCNC: 94 MG/DL (ref 74–99)
GLUCOSE SERPL-MCNC: 109 MG/DL (ref 74–99)
HBA1C MFR BLD: 6.1 %
HCT VFR BLD AUTO: 31.7 % (ref 36–46)
HDLC SERPL-MCNC: 22.1 MG/DL
HGB BLD-MCNC: 10.1 G/DL (ref 12–16)
INR PPP: 1.1 (ref 0.9–1.1)
LDLC SERPL CALC-MCNC: 40 MG/DL
LEFT ATRIUM VOLUME AREA LENGTH INDEX BSA: 27.4 ML/M2
LEFT VENTRICLE INTERNAL DIMENSION DIASTOLE: 3.19 CM (ref 3.5–6)
LEFT VENTRICULAR OUTFLOW TRACT DIAMETER: 1.9 CM
MCH RBC QN AUTO: 29.5 PG (ref 26–34)
MCHC RBC AUTO-ENTMCNC: 31.9 G/DL (ref 32–36)
MCV RBC AUTO: 93 FL (ref 80–100)
MITRAL VALVE E/A RATIO: 0.6
MITRAL VALVE E/E' RATIO: 14.33
NON HDL CHOLESTEROL: 77 MG/DL (ref 0–149)
NRBC BLD-RTO: 0 /100 WBCS (ref 0–0)
PLATELET # BLD AUTO: 188 X10*3/UL (ref 150–450)
POTASSIUM SERPL-SCNC: 4.1 MMOL/L (ref 3.5–5.3)
PROTHROMBIN TIME: 12.8 SECONDS (ref 9.8–12.8)
RBC # BLD AUTO: 3.42 X10*6/UL (ref 4–5.2)
RIGHT VENTRICLE FREE WALL PEAK S': 16.8 CM/S
RIGHT VENTRICLE PEAK SYSTOLIC PRESSURE: 38.5 MMHG
SODIUM SERPL-SCNC: 139 MMOL/L (ref 136–145)
TRICUSPID ANNULAR PLANE SYSTOLIC EXCURSION: 1.2 CM
TRIGL SERPL-MCNC: 184 MG/DL (ref 0–149)
VLDL: 37 MG/DL (ref 0–40)
WBC # BLD AUTO: 9.3 X10*3/UL (ref 4.4–11.3)

## 2024-02-13 PROCEDURE — 99223 1ST HOSP IP/OBS HIGH 75: CPT

## 2024-02-13 PROCEDURE — 86140 C-REACTIVE PROTEIN: CPT | Mod: IPSPLIT | Performed by: NURSE PRACTITIONER

## 2024-02-13 PROCEDURE — 93306 TTE W/DOPPLER COMPLETE: CPT | Mod: IPSPLIT

## 2024-02-13 PROCEDURE — 80061 LIPID PANEL: CPT | Mod: IPSPLIT | Performed by: NURSE PRACTITIONER

## 2024-02-13 PROCEDURE — 97535 SELF CARE MNGMENT TRAINING: CPT | Mod: GO,IPSPLIT

## 2024-02-13 PROCEDURE — 36415 COLL VENOUS BLD VENIPUNCTURE: CPT | Mod: IPSPLIT | Performed by: NURSE PRACTITIONER

## 2024-02-13 PROCEDURE — 93306 TTE W/DOPPLER COMPLETE: CPT | Performed by: INTERNAL MEDICINE

## 2024-02-13 PROCEDURE — 84484 ASSAY OF TROPONIN QUANT: CPT | Mod: IPSPLIT | Performed by: NURSE PRACTITIONER

## 2024-02-13 PROCEDURE — 80048 BASIC METABOLIC PNL TOTAL CA: CPT | Mod: IPSPLIT | Performed by: NURSE PRACTITIONER

## 2024-02-13 PROCEDURE — 85027 COMPLETE CBC AUTOMATED: CPT | Mod: IPSPLIT | Performed by: NURSE PRACTITIONER

## 2024-02-13 PROCEDURE — 85610 PROTHROMBIN TIME: CPT | Mod: IPSPLIT | Performed by: NURSE PRACTITIONER

## 2024-02-13 PROCEDURE — 82947 ASSAY GLUCOSE BLOOD QUANT: CPT | Mod: IPSPLIT

## 2024-02-13 PROCEDURE — 83036 HEMOGLOBIN GLYCOSYLATED A1C: CPT | Mod: GENLAB | Performed by: NURSE PRACTITIONER

## 2024-02-13 PROCEDURE — 2500000001 HC RX 250 WO HCPCS SELF ADMINISTERED DRUGS (ALT 637 FOR MEDICARE OP): Mod: IPSPLIT

## 2024-02-13 PROCEDURE — 97161 PT EVAL LOW COMPLEX 20 MIN: CPT | Mod: GP,IPSPLIT | Performed by: PHYSICAL THERAPIST

## 2024-02-13 PROCEDURE — 71275 CT ANGIOGRAPHY CHEST: CPT | Mod: IPSPLIT

## 2024-02-13 PROCEDURE — 97166 OT EVAL MOD COMPLEX 45 MIN: CPT | Mod: GO,IPSPLIT

## 2024-02-13 PROCEDURE — 2500000001 HC RX 250 WO HCPCS SELF ADMINISTERED DRUGS (ALT 637 FOR MEDICARE OP): Mod: IPSPLIT | Performed by: NURSE PRACTITIONER

## 2024-02-13 PROCEDURE — 2550000001 HC RX 255 CONTRASTS: Mod: IPSPLIT | Performed by: INTERNAL MEDICINE

## 2024-02-13 PROCEDURE — 2500000004 HC RX 250 GENERAL PHARMACY W/ HCPCS (ALT 636 FOR OP/ED): Mod: IPSPLIT

## 2024-02-13 PROCEDURE — 1200000002 HC GENERAL ROOM WITH TELEMETRY DAILY: Mod: IPSPLIT

## 2024-02-13 PROCEDURE — 83880 ASSAY OF NATRIURETIC PEPTIDE: CPT | Mod: IPSPLIT | Performed by: NURSE PRACTITIONER

## 2024-02-13 PROCEDURE — 71275 CT ANGIOGRAPHY CHEST: CPT | Performed by: RADIOLOGY

## 2024-02-13 PROCEDURE — 99222 1ST HOSP IP/OBS MODERATE 55: CPT | Performed by: NURSE PRACTITIONER

## 2024-02-13 PROCEDURE — 2500000002 HC RX 250 W HCPCS SELF ADMINISTERED DRUGS (ALT 637 FOR MEDICARE OP, ALT 636 FOR OP/ED): Mod: IPSPLIT

## 2024-02-13 RX ORDER — LEVOTHYROXINE SODIUM 75 UG/1
150 TABLET ORAL DAILY
Status: DISCONTINUED | OUTPATIENT
Start: 2024-02-13 | End: 2024-02-16 | Stop reason: HOSPADM

## 2024-02-13 RX ORDER — DEXTROSE MONOHYDRATE 100 MG/ML
0.3 INJECTION, SOLUTION INTRAVENOUS ONCE AS NEEDED
Status: DISCONTINUED | OUTPATIENT
Start: 2024-02-13 | End: 2024-02-16 | Stop reason: HOSPADM

## 2024-02-13 RX ORDER — ERGOCALCIFEROL 1.25 MG/1
1250 CAPSULE ORAL
Status: DISCONTINUED | OUTPATIENT
Start: 2024-02-13 | End: 2024-02-16 | Stop reason: HOSPADM

## 2024-02-13 RX ORDER — DEXTROSE MONOHYDRATE 100 MG/ML
0.3 INJECTION, SOLUTION INTRAVENOUS ONCE AS NEEDED
Status: DISCONTINUED | OUTPATIENT
Start: 2024-02-13 | End: 2024-02-13

## 2024-02-13 RX ORDER — CLOPIDOGREL BISULFATE 75 MG/1
75 TABLET ORAL DAILY
Status: DISCONTINUED | OUTPATIENT
Start: 2024-02-13 | End: 2024-02-16 | Stop reason: HOSPADM

## 2024-02-13 RX ORDER — POTASSIUM CHLORIDE 20 MEQ/1
20 TABLET, EXTENDED RELEASE ORAL DAILY
Status: DISCONTINUED | OUTPATIENT
Start: 2024-02-13 | End: 2024-02-16 | Stop reason: HOSPADM

## 2024-02-13 RX ORDER — INSULIN LISPRO 100 [IU]/ML
0-15 INJECTION, SOLUTION INTRAVENOUS; SUBCUTANEOUS
Status: DISCONTINUED | OUTPATIENT
Start: 2024-02-13 | End: 2024-02-16 | Stop reason: HOSPADM

## 2024-02-13 RX ORDER — TIZANIDINE 4 MG/1
2 TABLET ORAL DAILY
Status: DISCONTINUED | OUTPATIENT
Start: 2024-02-13 | End: 2024-02-16 | Stop reason: HOSPADM

## 2024-02-13 RX ORDER — LOSARTAN POTASSIUM 25 MG/1
25 TABLET ORAL DAILY
Status: DISCONTINUED | OUTPATIENT
Start: 2024-02-13 | End: 2024-02-16 | Stop reason: HOSPADM

## 2024-02-13 RX ORDER — FERROUS SULFATE 325(65) MG
1 TABLET ORAL DAILY
Status: DISCONTINUED | OUTPATIENT
Start: 2024-02-13 | End: 2024-02-16 | Stop reason: HOSPADM

## 2024-02-13 RX ORDER — INSULIN GLARGINE 100 [IU]/ML
11 INJECTION, SOLUTION SUBCUTANEOUS DAILY
Status: DISCONTINUED | OUTPATIENT
Start: 2024-02-13 | End: 2024-02-16 | Stop reason: HOSPADM

## 2024-02-13 RX ORDER — DOCUSATE SODIUM 100 MG/1
100 CAPSULE, LIQUID FILLED ORAL 2 TIMES DAILY
Status: DISCONTINUED | OUTPATIENT
Start: 2024-02-13 | End: 2024-02-16 | Stop reason: HOSPADM

## 2024-02-13 RX ORDER — AMLODIPINE BESYLATE 2.5 MG/1
2.5 TABLET ORAL DAILY
Status: DISCONTINUED | OUTPATIENT
Start: 2024-02-13 | End: 2024-02-16 | Stop reason: HOSPADM

## 2024-02-13 RX ORDER — LANOLIN ALCOHOL/MO/W.PET/CERES
1000 CREAM (GRAM) TOPICAL DAILY
Status: DISCONTINUED | OUTPATIENT
Start: 2024-02-13 | End: 2024-02-16 | Stop reason: HOSPADM

## 2024-02-13 RX ORDER — DEXTROSE 50 % IN WATER (D50W) INTRAVENOUS SYRINGE
25
Status: DISCONTINUED | OUTPATIENT
Start: 2024-02-13 | End: 2024-02-16 | Stop reason: HOSPADM

## 2024-02-13 RX ORDER — ATORVASTATIN CALCIUM 40 MG/1
80 TABLET, FILM COATED ORAL NIGHTLY
Status: DISCONTINUED | OUTPATIENT
Start: 2024-02-13 | End: 2024-02-16 | Stop reason: HOSPADM

## 2024-02-13 RX ORDER — METOPROLOL TARTRATE 25 MG/1
25 TABLET, FILM COATED ORAL 2 TIMES DAILY
Status: DISCONTINUED | OUTPATIENT
Start: 2024-02-13 | End: 2024-02-13

## 2024-02-13 RX ADMIN — AMLODIPINE BESYLATE 2.5 MG: 2.5 TABLET ORAL at 08:05

## 2024-02-13 RX ADMIN — TIZANIDINE 2 MG: 4 TABLET ORAL at 08:05

## 2024-02-13 RX ADMIN — DOCUSATE SODIUM 100 MG: 100 CAPSULE, LIQUID FILLED ORAL at 20:19

## 2024-02-13 RX ADMIN — METOPROLOL TARTRATE 25 MG: 25 TABLET, FILM COATED ORAL at 20:19

## 2024-02-13 RX ADMIN — IOHEXOL 75 ML: 350 INJECTION, SOLUTION INTRAVENOUS at 22:24

## 2024-02-13 RX ADMIN — ATORVASTATIN CALCIUM 80 MG: 40 TABLET, FILM COATED ORAL at 20:19

## 2024-02-13 RX ADMIN — ENOXAPARIN SODIUM 30 MG: 30 INJECTION SUBCUTANEOUS at 20:19

## 2024-02-13 RX ADMIN — CYANOCOBALAMIN TAB 1000 MCG 1000 MCG: 1000 TAB at 08:04

## 2024-02-13 RX ADMIN — POTASSIUM CHLORIDE 20 MEQ: 1500 TABLET, EXTENDED RELEASE ORAL at 08:05

## 2024-02-13 RX ADMIN — LEVOTHYROXINE SODIUM 150 MCG: 75 TABLET ORAL at 08:05

## 2024-02-13 RX ADMIN — ATORVASTATIN CALCIUM 80 MG: 40 TABLET, FILM COATED ORAL at 00:36

## 2024-02-13 RX ADMIN — DOCUSATE SODIUM 100 MG: 100 CAPSULE, LIQUID FILLED ORAL at 08:05

## 2024-02-13 RX ADMIN — LOSARTAN POTASSIUM 25 MG: 25 TABLET, FILM COATED ORAL at 08:05

## 2024-02-13 RX ADMIN — CLOPIDOGREL BISULFATE 75 MG: 75 TABLET ORAL at 08:05

## 2024-02-13 RX ADMIN — FERROUS SULFATE TAB 325 MG (65 MG ELEMENTAL FE) 1 TABLET: 325 (65 FE) TAB at 08:05

## 2024-02-13 SDOH — ECONOMIC STABILITY: FOOD INSECURITY: WITHIN THE PAST 12 MONTHS, THE FOOD YOU BOUGHT JUST DIDN'T LAST AND YOU DIDN'T HAVE MONEY TO GET MORE.: NEVER TRUE

## 2024-02-13 SDOH — ECONOMIC STABILITY: HOUSING INSECURITY
IN THE LAST 12 MONTHS, WAS THERE A TIME WHEN YOU DID NOT HAVE A STEADY PLACE TO SLEEP OR SLEPT IN A SHELTER (INCLUDING NOW)?: NO

## 2024-02-13 SDOH — SOCIAL STABILITY: SOCIAL NETWORK: ARE YOU MARRIED, WIDOWED, DIVORCED, SEPARATED, NEVER MARRIED, OR LIVING WITH A PARTNER?: WIDOWED

## 2024-02-13 SDOH — ECONOMIC STABILITY: TRANSPORTATION INSECURITY
IN THE PAST 12 MONTHS, HAS LACK OF TRANSPORTATION KEPT YOU FROM MEETINGS, WORK, OR FROM GETTING THINGS NEEDED FOR DAILY LIVING?: NO

## 2024-02-13 SDOH — ECONOMIC STABILITY: INCOME INSECURITY: HOW HARD IS IT FOR YOU TO PAY FOR THE VERY BASICS LIKE FOOD, HOUSING, MEDICAL CARE, AND HEATING?: NOT HARD AT ALL

## 2024-02-13 SDOH — ECONOMIC STABILITY: HOUSING INSECURITY: IN THE LAST 12 MONTHS, HOW MANY PLACES HAVE YOU LIVED?: 1

## 2024-02-13 SDOH — ECONOMIC STABILITY: FOOD INSECURITY: WITHIN THE PAST 12 MONTHS, YOU WORRIED THAT YOUR FOOD WOULD RUN OUT BEFORE YOU GOT MONEY TO BUY MORE.: NEVER TRUE

## 2024-02-13 SDOH — ECONOMIC STABILITY: INCOME INSECURITY: IN THE LAST 12 MONTHS, WAS THERE A TIME WHEN YOU WERE NOT ABLE TO PAY THE MORTGAGE OR RENT ON TIME?: NO

## 2024-02-13 SDOH — ECONOMIC STABILITY: INCOME INSECURITY: IN THE PAST 12 MONTHS, HAS THE ELECTRIC, GAS, OIL, OR WATER COMPANY THREATENED TO SHUT OFF SERVICE IN YOUR HOME?: NO

## 2024-02-13 SDOH — ECONOMIC STABILITY: TRANSPORTATION INSECURITY
IN THE PAST 12 MONTHS, HAS THE LACK OF TRANSPORTATION KEPT YOU FROM MEDICAL APPOINTMENTS OR FROM GETTING MEDICATIONS?: NO

## 2024-02-13 ASSESSMENT — COGNITIVE AND FUNCTIONAL STATUS - GENERAL
STANDING UP FROM CHAIR USING ARMS: A LITTLE
TURNING FROM BACK TO SIDE WHILE IN FLAT BAD: A LITTLE
TURNING FROM BACK TO SIDE WHILE IN FLAT BAD: A LITTLE
DAILY ACTIVITIY SCORE: 17
MOBILITY SCORE: 17
DRESSING REGULAR UPPER BODY CLOTHING: A LITTLE
DAILY ACTIVITIY SCORE: 17
PERSONAL GROOMING: A LITTLE
PERSONAL GROOMING: A LITTLE
HELP NEEDED FOR BATHING: A LOT
DRESSING REGULAR UPPER BODY CLOTHING: A LITTLE
STANDING UP FROM CHAIR USING ARMS: A LITTLE
MOVING FROM LYING ON BACK TO SITTING ON SIDE OF FLAT BED WITH BEDRAILS: A LITTLE
MOVING FROM LYING ON BACK TO SITTING ON SIDE OF FLAT BED WITH BEDRAILS: A LITTLE
MOVING TO AND FROM BED TO CHAIR: A LITTLE
TOILETING: A LITTLE
DRESSING REGULAR LOWER BODY CLOTHING: A LOT
MOVING TO AND FROM BED TO CHAIR: A LITTLE
WALKING IN HOSPITAL ROOM: A LITTLE
MOBILITY SCORE: 17
TOILETING: A LITTLE
CLIMB 3 TO 5 STEPS WITH RAILING: A LOT
CLIMB 3 TO 5 STEPS WITH RAILING: A LOT
HELP NEEDED FOR BATHING: A LOT
WALKING IN HOSPITAL ROOM: A LITTLE
DRESSING REGULAR LOWER BODY CLOTHING: A LOT

## 2024-02-13 ASSESSMENT — ACTIVITIES OF DAILY LIVING (ADL)
ADL_ASSISTANCE: INDEPENDENT
HOME_MANAGEMENT_TIME_ENTRY: 25
BATHING_ASSISTANCE: MODERATE
ADL_ASSISTANCE: INDEPENDENT
BATHING_LEVEL_OF_ASSISTANCE: SETUP;MODERATE ASSISTANCE

## 2024-02-13 ASSESSMENT — PAIN - FUNCTIONAL ASSESSMENT
PAIN_FUNCTIONAL_ASSESSMENT: 0-10

## 2024-02-13 ASSESSMENT — ENCOUNTER SYMPTOMS
EYES NEGATIVE: 1
MUSCULOSKELETAL NEGATIVE: 1
PSYCHIATRIC NEGATIVE: 1
GASTROINTESTINAL NEGATIVE: 1
ENDOCRINE NEGATIVE: 1
ALLERGIC/IMMUNOLOGIC NEGATIVE: 1
NEUROLOGICAL NEGATIVE: 1
HEMATOLOGIC/LYMPHATIC NEGATIVE: 1
FATIGUE: 1
SHORTNESS OF BREATH: 1

## 2024-02-13 ASSESSMENT — PAIN SCALES - GENERAL
PAINLEVEL_OUTOF10: 0 - NO PAIN

## 2024-02-13 NOTE — H&P
History Of Present Illness  Stella A Saturday is a 88 y.o. female presenting with chest pain. Pt was sent from PCP office for sharp left sided chest pain that radiated into the neck and the jaw. Pt states that the pain is intermittent, lasting about 2-3 minutes. The pain is worse with any exertion or breathing. Pt had a cardiac catheterization in July of 2023 that showed stenosis of proximal nondominant Lcx. At this time, pt also had an acute left MCA territory infarct. Pt is on nitrates, BB, statin, and plavix.     ED VS: T35.9, HR 64, RR 26, /80, Sp02 100%RA    Imaging: CXR- 1. Low lung volumes with newly seen platelike atelectasis.     Labs: Glu 104, Na 138, K 4.2, Bun/creat 24/1.40, Trop 9, D-dimer 822, WBC 11.1, H/H 11.2/34.0, Plt 249    Past Medical History  Past Medical History:   Diagnosis Date    BMI 21.0-21.9, adult     Chronic kidney disease, stage 3b (CMS/HCC) 08/22/2022    Stage 3b chronic kidney disease    Other specified diabetes mellitus with hyperosmolarity without nonketotic hyperglycemic-hyperosmolar coma ([facility]) (CMS/[facility])     Diabetes mellitus of other type with hyperosmolarity, with long-term current use of insulin    Personal history of other diseases of the digestive system     History of diverticulitis    Personal history of other diseases of the musculoskeletal system and connective tissue     History of gout    Personal history of other endocrine, nutritional and metabolic disease     History of hypercholesterolemia    Personal history of other endocrine, nutritional and metabolic disease 02/28/2019    History of type 2 diabetes mellitus    Personal history of other mental and behavioral disorders 08/09/2018    History of depression    Personal history of other specified conditions 09/22/2022    History of chronic fatigue    Soft tissue disorder, unspecified 10/02/2018    Soft tissue lesion of elbow region       Surgical History  Past Surgical History:   Procedure Laterality Date     EYE SURGERY  08/28/2017    Eye Surgery    FOOT SURGERY  08/28/2017    Foot Surgery    GALLBLADDER SURGERY  08/28/2017    Gallbladder Surgery    TONSILLECTOMY  10/11/2017    Tonsillectomy    TOTAL THYROIDECTOMY  10/11/2017    Thyroid Surgery Total Thyroidectomy        Social History  She reports that she has never smoked. She has never used smokeless tobacco. She reports that she does not drink alcohol and does not use drugs.    Family History  Family History   Problem Relation Name Age of Onset    Diabetes Mother      No Known Problems Father      Diabetes Brother          Allergies  Metformin, Penicillins, Sulfa (sulfonamide antibiotics), and Allopurinol    Review of Systems   Constitutional:  Positive for fatigue.   HENT: Negative.     Eyes: Negative.    Respiratory:  Positive for shortness of breath.    Cardiovascular:  Positive for chest pain.   Gastrointestinal: Negative.    Endocrine: Negative.    Genitourinary: Negative.    Musculoskeletal: Negative.    Skin: Negative.    Allergic/Immunologic: Negative.    Neurological: Negative.    Hematological: Negative.    Psychiatric/Behavioral: Negative.          Physical Exam  HENT:      Head: Normocephalic.      Nose: Nose normal.      Mouth/Throat:      Pharynx: Oropharynx is clear.   Eyes:      Conjunctiva/sclera: Conjunctivae normal.   Cardiovascular:      Rate and Rhythm: Normal rate and regular rhythm.      Heart sounds: Murmur heard.      Friction rub present.   Pulmonary:      Breath sounds: Decreased breath sounds present.   Abdominal:      General: Bowel sounds are normal.      Palpations: Abdomen is soft.   Musculoskeletal:         General: Normal range of motion.      Cervical back: Normal range of motion and neck supple.   Skin:     General: Skin is warm and dry.   Neurological:      General: No focal deficit present.      Mental Status: She is alert.   Psychiatric:         Mood and Affect: Mood normal.         Behavior: Behavior normal.          Last  "Recorded Vitals  Blood pressure 141/64, pulse 60, temperature 36.6 °C (97.9 °F), temperature source Temporal, resp. rate 18, height 1.6 m (5' 3\"), weight 57.6 kg (127 lb), SpO2 96 %.    Relevant Results  Scheduled medications  amLODIPine, 2.5 mg, oral, Daily  atorvastatin, 80 mg, oral, Nightly  clopidogrel, 75 mg, oral, Daily  cyanocobalamin, 1,000 mcg, oral, Daily  docusate sodium, 100 mg, oral, BID  enoxaparin, 30 mg, subcutaneous, q24h  ergocalciferol, 1,250 mcg, oral, Weekly  ferrous sulfate (325 mg ferrous sulfate), 1 tablet, oral, Daily  levothyroxine, 150 mcg, oral, Daily  losartan, 25 mg, oral, Daily  metoprolol tartrate, 25 mg, oral, q12h LJ  perflutren lipid microspheres, 0.5-10 mL of dilution, intravenous, Once in imaging  perflutren protein A microsphere, 0.5 mL, intravenous, Once in imaging  potassium chloride CR, 20 mEq, oral, Daily  sulfur hexafluoride microsphr, 2 mL, intravenous, Once in imaging  tiZANidine, 2 mg, oral, Daily      Continuous medications     PRN medications  PRN medications: nitroglycerin, oxygen    Results for orders placed or performed during the hospital encounter of 02/12/24 (from the past 24 hour(s))   ECG 12 Lead   Result Value Ref Range    Ventricular Rate 54 BPM    Atrial Rate 54 BPM    KY Interval 204 ms    QRS Duration 84 ms    QT Interval 440 ms    QTC Calculation(Bazett) 417 ms    P Axis 19 degrees    R Axis 26 degrees    T Axis 39 degrees    QRS Count 9 beats    Q Onset 224 ms    P Onset 122 ms    P Offset 175 ms    T Offset 444 ms    QTC Fredericia 424 ms   D-Dimer, VTE Exclusion   Result Value Ref Range    D-Dimer, Quantitative VTE Exclusion 822 (H) <=500 ng/mL FEU   Comprehensive Metabolic Panel   Result Value Ref Range    Glucose 104 (H) 74 - 99 mg/dL    Sodium 138 136 - 145 mmol/L    Potassium 4.2 3.5 - 5.3 mmol/L    Chloride 107 98 - 107 mmol/L    Bicarbonate 19 (L) 21 - 32 mmol/L    Anion Gap 16 10 - 20 mmol/L    Urea Nitrogen 24 (H) 6 - 23 mg/dL    Creatinine " 1.40 (H) 0.50 - 1.05 mg/dL    eGFR 37 (L) >60 mL/min/1.73m*2    Calcium 10.0 8.6 - 10.3 mg/dL    Albumin 4.4 3.4 - 5.0 g/dL    Alkaline Phosphatase 88 33 - 136 U/L    Total Protein 6.7 6.4 - 8.2 g/dL    AST 16 9 - 39 U/L    Bilirubin, Total 0.9 0.0 - 1.2 mg/dL    ALT 11 7 - 45 U/L   CBC and Auto Differential   Result Value Ref Range    WBC 11.1 4.4 - 11.3 x10*3/uL    nRBC 0.0 0.0 - 0.0 /100 WBCs    RBC 3.80 (L) 4.00 - 5.20 x10*6/uL    Hemoglobin 11.2 (L) 12.0 - 16.0 g/dL    Hematocrit 34.0 (L) 36.0 - 46.0 %    MCV 90 80 - 100 fL    MCH 29.5 26.0 - 34.0 pg    MCHC 32.9 32.0 - 36.0 g/dL    RDW 13.5 11.5 - 14.5 %    Platelets 249 150 - 450 x10*3/uL    Neutrophils % 61.9 40.0 - 80.0 %    Immature Granulocytes %, Automated 0.5 0.0 - 0.9 %    Lymphocytes % 27.3 13.0 - 44.0 %    Monocytes % 7.0 2.0 - 10.0 %    Eosinophils % 2.4 0.0 - 6.0 %    Basophils % 0.9 0.0 - 2.0 %    Neutrophils Absolute 6.85 (H) 1.60 - 5.50 x10*3/uL    Immature Granulocytes Absolute, Automated 0.05 0.00 - 0.50 x10*3/uL    Lymphocytes Absolute 3.03 (H) 0.80 - 3.00 x10*3/uL    Monocytes Absolute 0.78 0.05 - 0.80 x10*3/uL    Eosinophils Absolute 0.27 0.00 - 0.40 x10*3/uL    Basophils Absolute 0.10 0.00 - 0.10 x10*3/uL   Troponin I, High Sensitivity, Initial   Result Value Ref Range    Troponin I, High Sensitivity 9 0 - 13 ng/L   Sars-CoV-2 and Influenza A/B PCR   Result Value Ref Range    Flu A Result Not Detected Not Detected    Flu B Result Not Detected Not Detected    Coronavirus 2019, PCR Not Detected Not Detected   Troponin, High Sensitivity, 1 Hour   Result Value Ref Range    Troponin I, High Sensitivity 9 0 - 13 ng/L   Troponin I, High Sensitivity   Result Value Ref Range    Troponin I, High Sensitivity 9 0 - 13 ng/L   C-Reactive Protein   Result Value Ref Range    C-Reactive Protein 0.14 <1.00 mg/dL   B-Type Natriuretic Peptide   Result Value Ref Range     (H) 0 - 99 pg/mL   CBC   Result Value Ref Range    WBC 9.3 4.4 - 11.3 x10*3/uL     nRBC 0.0 0.0 - 0.0 /100 WBCs    RBC 3.42 (L) 4.00 - 5.20 x10*6/uL    Hemoglobin 10.1 (L) 12.0 - 16.0 g/dL    Hematocrit 31.7 (L) 36.0 - 46.0 %    MCV 93 80 - 100 fL    MCH 29.5 26.0 - 34.0 pg    MCHC 31.9 (L) 32.0 - 36.0 g/dL    RDW 13.7 11.5 - 14.5 %    Platelets 188 150 - 450 x10*3/uL   Coagulation Screen   Result Value Ref Range    Protime 12.8 9.8 - 12.8 seconds    INR 1.1 0.9 - 1.1    aPTT 33 27 - 38 seconds   Basic Metabolic Panel   Result Value Ref Range    Glucose 109 (H) 74 - 99 mg/dL    Sodium 139 136 - 145 mmol/L    Potassium 4.1 3.5 - 5.3 mmol/L    Chloride 111 (H) 98 - 107 mmol/L    Bicarbonate 19 (L) 21 - 32 mmol/L    Anion Gap 13 10 - 20 mmol/L    Urea Nitrogen 23 6 - 23 mg/dL    Creatinine 1.30 (H) 0.50 - 1.05 mg/dL    eGFR 40 (L) >60 mL/min/1.73m*2    Calcium 9.0 8.6 - 10.3 mg/dL   Lipid Panel   Result Value Ref Range    Cholesterol 99 0 - 199 mg/dL    HDL-Cholesterol 22.1 mg/dL    Cholesterol/HDL Ratio 4.5     LDL Calculated 40 <=99 mg/dL    VLDL 37 0 - 40 mg/dL    Triglycerides 184 (H) 0 - 149 mg/dL    Non HDL Cholesterol 77 0 - 149 mg/dL        Assessment/Plan   Principal Problem:    Acute chest pain      #Chest pain, ACS rule out   #Hypertension  #HLD  #Chronic diastolic heart failure  -CXR: 1. Low lung volumes with newly seen platelike atelectasis.   -Echo (3/23): 1. Left ventricular systolic function is normal with a 65-70% estimated ejection fraction.  2. Spectral Doppler shows an impaired relaxation pattern of left ventricular diastolic filling.  3. There is moderate to severe mitral annular calcification.  4. Mild aortic valve stenosis.  5. There is moderate aortic valve cusp calcification.  -Repeat echo pending   -D-dimer 822  -Trop 9 > 9 > 9  -  -CRP 0.14  -Lipid panel: Mariella 99, HDL 22.1, LDL 40, Trig 184  -CT PE pending  -Cardiac monitoring  -Cardiology consult, appreciate recs  -Continue amlodipine, atorvastatin, clopidogrel, losartan, metoprolol    #DM Type II  -SSI with  hypoglycemia protocol  -Monitor BG     #Hx of CVA  -Continue atorvastatin, clopidogrel     #CKD  -Baseline creat ~ 1.2  -Bun/creat 23/1.30  -Renally dose medications     #Hypothyroidism  -Continue levothyroxine    #Iron deficiency amenia  -Stable  -H/H 10.1/31.7  -Continue cyanocobalamin, ergocalciferol, ferrous sulfate     DVT ppx  -lovenox    F: PRN  E: Replete per protocol  N: Cardiac  A: PIV    Disposition: Pt requires more than 2 inpatient days at this time   Code Status: Full Code    Total accumulated time spent face to face and not face to face preparing to see the patient, obtaining and reviewing separately obtained history; performing a medically appropriate examination and/or evaluation; counseling and educating the patient, family; ordering medications, tests, or procedures; referring and communicating with other health care professionals; documenting clinical information in the patient's medical record; independently interpreting results and communicating the results to the patient, family; and care coordination was 30 minutes.        SD Reed-CNP

## 2024-02-13 NOTE — PROGRESS NOTES
Physical Therapy                 Therapy Communication Note    Patient Name: Stella WILKES Saturday  MRN: [id number]  Today's Date: 2/13/2024     Discipline: Physical Therapy    Missed Visit Reason: Missed Visit Reason:  (pt. working with OT; will check on pt. at a later time)    Missed Time: Attempt 900

## 2024-02-13 NOTE — PATIENT INSTRUCTIONS
It was great to see you in the office today! Here is what we discussed at your visit today:  As discussed, please go to [facility] for further workup due to your shortness of breath and chest pain

## 2024-02-13 NOTE — CONSULTS
Cardiology Consult Note  Patient: Stella WILKES Saturday  Unit/Bed: 02/02-A  YOB: 1935    Admitting Diagnosis: Acute chest pain [R07.9]  Date:  2/12/2024  Hospital Day: 1  Attending: Marika Peña MD      Chief Complaint   Patient presents with    Chest Pain     L side sharp intermittent pain starting around 1500        SUBJECTIVE:     History of Present Illness:  Stella WILKES Saturday is a 88 y.o. female patient who is being seen at the request of Dr. Jolley for inpatient consultation of chest pain. Presents to ED with complaints of left sided chest pain. Patient is relatively poor historian and only able to provide some history. Other information obtained from conversation with Patient nephtiki Paige. Patient was picked up yesterday for family practice appointment and mentioned left sided chest ache/pain. Per patient is was a sharp pain coming in waves lasting only a few seconds at a time.Nothing makes it better or worse. She notes that this is the same pain that she has been having for about a year now. While in the office she was instructed to go to the ED. Per nephew she has been having progressive weakness, falling frequently. History of CAD s/p [facility] 7/31/2023 with pLCx 80% recommended med management started on long acting nitrate. She is no longer on and unclear as to why. Concern for dementia but has not had a formal diagnosis yet. No further chest pain since admission.     All previous records reviewed.  PMHx significant for: CAD hx remote PCI, HTN, HLD, Type II DM, CVA and [place], HTN, HLD, [place], seizure disorder      Review of Systems:   Review of Systems   All other systems reviewed and are negative.      Allergies:  Allergies   Allergen Reactions    Metformin Diarrhea    Penicillins Nausea Only and Other     SOB    Sulfa (Sulfonamide Antibiotics) Unknown    Allopurinol Itching and Rash      Home Medication:  Medications Prior to Admission   Medication Sig Dispense Refill Last Dose    acetaminophen  "(Tylenol) 325 mg tablet Take by mouth.       amLODIPine (Norvasc) 2.5 mg tablet Take 1 tablet (2.5 mg) by mouth once daily.       atorvastatin (Lipitor) 80 mg tablet Take 1 tablet (80 mg) by mouth once daily at bedtime. 90 tablet 0     Autolet (Accu-Chek FastClix Lancing Dev) lancing device Use as instructed 1 each 3     blood sugar diagnostic (True Metrix Glucose Test Strip) strip 1 strip 3 times a day. 200 strip 3     clopidogrel (Plavix) 75 mg tablet Take 1 tablet (75 mg) by mouth once daily. 90 tablet 0     cyanocobalamin (Vitamin B-12) 1,000 mcg tablet Take 1 tablet (1,000 mcg) by mouth once daily. 90 tablet 1     docusate sodium (Colace) 100 mg capsule Take by mouth.       ergocalciferol (Vitamin D-2) 1.25 MG (82113 UT) capsule Take 1 capsule (1,250 mcg) by mouth 1 (one) time per week. 12 capsule 0     ferrous sulfate, 325 mg ferrous sulfate, (FeroSuL) tablet Take 1 tablet by mouth once daily. as directed 90 tablet 0     FreeStyle glucose monitoring (True Metrix Glucose Meter) kit Use as directed 1 each 0     glucose 4 gram chewable tablet 8 g AS DIRECTED (route: BY MOUTH)       insulin degludec (Tresiba FlexTouch U-100) 100 unit/mL (3 mL) injection Inject 14 Units under the skin once daily in the morning. 12 mL 1     levothyroxine (Synthroid, Levoxyl) 150 mcg tablet Take 1 tablet (150 mcg) by mouth once daily. 90 tablet 1     lidocaine (Lidoderm) 5 % patch Apply topically.       losartan (Cozaar) 25 mg tablet Take 1 tablet (25 mg) by mouth once daily. 90 tablet 0     metoprolol tartrate (Lopressor) 25 mg tablet Take 1 tablet (25 mg) by mouth 2 times a day. 180 tablet 0     omega 3-dha-epa-fish oil (Fish OiL) 1,000 mg (120 mg-180 mg) capsule Take by mouth.       pen needle, diabetic 31 gauge x 5/16\" needle Use 1 time daily with insulin pen       potassium chloride CR 20 mEq ER tablet Take 1 tablet (20 mEq) by mouth once daily. Do not crush, chew, or split. 90 tablet 1     tiZANidine (Zanaflex) 2 mg tablet Take " 1 tablet (2 mg) by mouth once daily. 90 tablet 0       PMHx:  Past Medical History:   Diagnosis Date    BMI 21.0-21.9, adult     Chronic kidney disease, stage 3b (CMS/[facility]) 08/22/2022    Stage 3b chronic kidney disease    Other specified diabetes mellitus with hyperosmolarity without nonketotic hyperglycemic-hyperosmolar coma ([facility]) (CMS/[facility])     Diabetes mellitus of other type with hyperosmolarity, with long-term current use of insulin    Personal history of other diseases of the digestive system     History of diverticulitis    Personal history of other diseases of the musculoskeletal system and connective tissue     History of gout    Personal history of other endocrine, nutritional and metabolic disease     History of hypercholesterolemia    Personal history of other endocrine, nutritional and metabolic disease 02/28/2019    History of type 2 diabetes mellitus    Personal history of other mental and behavioral disorders 08/09/2018    History of depression    Personal history of other specified conditions 09/22/2022    History of chronic fatigue    Soft tissue disorder, unspecified 10/02/2018    Soft tissue lesion of elbow region       PSHx:  Past Surgical History:   Procedure Laterality Date    EYE SURGERY  08/28/2017    Eye Surgery    FOOT SURGERY  08/28/2017    Foot Surgery    GALLBLADDER SURGERY  08/28/2017    Gallbladder Surgery    TONSILLECTOMY  10/11/2017    Tonsillectomy    TOTAL THYROIDECTOMY  10/11/2017    Thyroid Surgery Total Thyroidectomy       Social Hx:  Social History     Socioeconomic History    Marital status:      Spouse name: None    Number of children: None    Years of education: None    Highest education level: None   Occupational History    None   Tobacco Use    Smoking status: Never    Smokeless tobacco: Never   Vaping Use    Vaping Use: Never used   Substance and Sexual Activity    Alcohol use: Never    Drug use: Never    Sexual activity: Not Currently   Other Topics Concern     None   Social History Narrative    None     Social Determinants of Health     Financial Resource Strain: Low Risk  (2/13/2024)    Overall Financial Resource Strain (CARDIA)     Difficulty of Paying Living Expenses: Not hard at all   Food Insecurity: No Food Insecurity (2/13/2024)    Hunger Vital Sign     Worried About Running Out of Food in the Last Year: Never true     Ran Out of Food in the Last Year: Never true   Transportation Needs: No Transportation Needs (2/13/2024)    PRAPARE - Transportation     Lack of Transportation (Medical): No     Lack of Transportation (Non-Medical): No   Physical Activity: Not on file   Stress: Not on file   Social Connections: Unknown (2/13/2024)    Social Connection and Isolation Panel [NHANES]     Frequency of Communication with Friends and Family: Not on file     Frequency of Social Gatherings with Friends and Family: Not on file     Attends Orthodoxy Services: Not on file     Active Member of Clubs or Organizations: Not on file     Attends Club or Organization Meetings: Not on file     Marital Status:    Intimate Partner Violence: Not on file   Housing Stability: Low Risk  (2/13/2024)    Housing Stability Vital Sign     Unable to Pay for Housing in the Last Year: No     Number of Places Lived in the Last Year: 1     Unstable Housing in the Last Year: No       Family Hx:  Family History   Problem Relation Name Age of Onset    Diabetes Mother      No Known Problems Father      Diabetes Brother         OBJECTIVE  Vitals:   Visit Vitals  /64 (BP Location: Right arm, Patient Position: Lying)   Pulse 60   Temp 36.6 °C (97.9 °F) (Temporal)   Resp 18        Wt Readings from Last 5 Encounters:   02/13/24 57.6 kg (127 lb)   02/12/24 60.8 kg (134 lb 1.6 oz)   10/11/23 58.1 kg (128 lb)   10/09/23 57.2 kg (126 lb)   09/21/23 61.2 kg (135 lb)       Intake / Output:   I/O last 3 completed shifts:  In: - (0 mL/kg)   Out: 200 (3.5 mL/kg) [Urine:200 (0.1 mL/kg/hr)]  Weight: 57.6 kg       Tele monitoring: SR rate 50-60s    Physical Examination:    Vitals reviewed.   Constitutional:       General: Awake.      Appearance: Normal and healthy appearance. Well-developed and not in distress.   Eyes:      General: Lids are normal.      Pupils: Pupils are equal, round, and reactive to light.   HENT:      Nose: Nose normal.    Mouth/Throat:      Lips: Pink.      Mouth: Mucous membranes are moist.   Neck:      Vascular: JVD normal.   Pulmonary:      Effort: Pulmonary effort is normal.      Breath sounds: Normal breath sounds and air entry.   Chest:      Chest wall: Not tender to palpatation.   Cardiovascular:      PMI at left midclavicular line. Normal rate. Regular rhythm. Normal S1. Normal S2.       Murmurs: There is a grade 2/6 blowing holosystolic murmur.   Edema:     Peripheral edema absent.   Abdominal:      General: Bowel sounds are normal.      Palpations: Abdomen is soft.      Tenderness: There is no abdominal tenderness.   Musculoskeletal: Normal range of motion.      Cervical back: Full passive range of motion without pain, normal range of motion and neck supple. Skin:     General: Skin is warm and dry.   Neurological:      General: No focal deficit present.      Mental Status: Alert, oriented to person, place, and time and oriented to person, place and time. Mental status is at baseline.   Psychiatric:         Attention and Perception: Attention and perception normal.         Mood and Affect: Mood and affect normal.         Speech: Speech normal.         Behavior: Behavior normal. Behavior is cooperative.         Thought Content: Thought content normal.        LABS:  CMP:   Recent Labs     02/13/24  0442 02/12/24  1715 01/30/24  0836 08/04/23  0719 04/12/23  0700 12/29/22  0843 09/28/22  1042 06/23/22  1148 03/08/22  0832 10/15/21  0847    138 142 135* 141 141 138 143 141 138   K 4.1 4.2 4.6 3.6 3.3* 4.0 3.9 4.1 4.3 4.4   * 107 106 107 110* 107 105 109* 106 105   CO2 19* 19* 23 22  "24 24 22 24 26 22   ANIONGAP 13 16 18 10 10 14 15 14 13 15   BUN 23 24* 21 38* 25* 18 23 19 18 26*   CREATININE 1.30* 1.40* 1.22* 1.70* 1.23* 1.23* 1.48* 1.18* 1.25* 1.29*   EGFR 40* 37* 43*  --   --   --   --   --   --   --    MG  --   --   --  2.05  --   --   --   --   --   --      LIVER ENZYMES:   Recent Labs     02/12/24  1715 08/04/23  0719 12/29/22  0843 09/28/22  1042 06/23/22  1148 03/08/22  0832 10/15/21  0847 04/07/20  0822 01/17/19  0915   ALBUMIN 4.4 3.7 3.9 4.3 4.3 4.3 4.2 4.4 3.7   ALT 11  --  12 14 8 11 6* 8 9   AST 16  --  17 21 14 16 14 17 14   BILITOT 0.9  --  0.4 0.9 0.8 0.4 0.5 0.6 0.3     CBC:  Recent Labs     02/13/24 0442 02/12/24 1715 01/30/24  0836 08/04/23  0719 12/29/22  0843 09/28/22  1042 06/23/22  1148 03/08/22  0832   WBC 9.3 11.1 9.9 10.7 12.3* 9.2 7.7 10.3   HGB 10.1* 11.2* 11.5* 9.7* 10.4* 11.2* 11.4* 12.0   HCT 31.7* 34.0* 38.5 29.7* 33.3* 35.1* 36.6 38.7    249 241 257 390 226 239 219   MCV 93 90 100 89 94 93 95 94     COAG:   Recent Labs     02/13/24 0442 08/22/18  1118   INR 1.1 0.9     ABO: No results for input(s): \"ABO\" in the last 71860 hours.  HEME/ENDO:  Recent Labs     02/12/24  1551 01/30/24  0836 08/01/23  0844 04/06/23  1550 11/02/22  0834 10/06/22  1343 04/23/22  1541 03/08/22  0832 04/07/20  0822 02/28/19  1132   FERRITIN  --   --   --   --   --  53  --   --   --  50   IRONSAT  --   --   --   --   --  24*  --  12*  --  14*   TSH  --  0.04*  --  0.05*   < >  --    < > 1.76   < > 0.62   HGBA1C 6.6*  --  6.3  --    < >  --    < > 6.2*   < > 8.1    < > = values in this interval not displayed.      CARDIAC:   Recent Labs     02/13/24  0442 02/12/24  1807 02/12/24  1715 02/24/23  1130 10/06/22  1343   LDH  --   --   --   --  180   TROPHS 9 9 9  --   --    *  --   --  140*  --        Lipid Panel:  Lab Results   Component Value Date    HDL 22.1 02/13/2024    CHHDL 4.5 02/13/2024    VLDL 37 02/13/2024    TRIG 184 (H) 02/13/2024    NHDL 77 02/13/2024    "     Current Medications:   MEDICATIONS  Infusions:     Scheduled:  amLODIPine, 2.5 mg, Daily  atorvastatin, 80 mg, Nightly  clopidogrel, 75 mg, Daily  cyanocobalamin, 1,000 mcg, Daily  docusate sodium, 100 mg, BID  enoxaparin, 30 mg, q24h  ergocalciferol, 1,250 mcg, Weekly  ferrous sulfate (325 mg ferrous sulfate), 1 tablet, Daily  levothyroxine, 150 mcg, Daily  losartan, 25 mg, Daily  metoprolol tartrate, 25 mg, q12h LJ  perflutren lipid microspheres, 0.5-10 mL of dilution, Once in imaging  perflutren protein A microsphere, 0.5 mL, Once in imaging  potassium chloride CR, 20 mEq, Daily  sulfur hexafluoride microsphr, 2 mL, Once in imaging  tiZANidine, 2 mg, Daily      PRN:  nitroglycerin, 0.4 mg, q5 min PRN  oxygen, , Continuous PRN - O2/gases        Cardiovascular studies:    EKG: All available ECGs independently reviewed  Encounter Date: 02/12/24   ECG 12 Lead   Result Value    Ventricular Rate 54    Atrial Rate 54    AL Interval 204    QRS Duration 84    QT Interval 440    QTC Calculation(Bazett) 417    P Axis 19    R Axis 26    T Axis 39    QRS Count 9    Q Onset 224    P Onset 122    P Offset 175    T Offset 444    QTC Fredericia 424    Narrative    Sinus bradycardia  Otherwise normal ECG  When compared with ECG of 05-AUG-2023 09:03,  Previous ECG has undetermined rhythm, needs review  QT has shortened  See ED provider note for full interpretation and clinical correlation  Confirmed by Diana Sanchez ([id number]) on 2/12/2024 6:47:20 PM     Echocardiogram: THU(GOQL167:1:1825)   Echocardiogram     Narrative  [facility], [address] [place] [address]  Tel [phone] and Fax [phone]    TRANSTHORACIC ECHOCARDIOGRAM REPORT      Patient Name:     JADA WILKES SATURDAY Reading Physician:   [id number] Mariann Carbone MD  Study Date:       3/6/2023         Referring Physician: ELVIA SANTIAGO  MRN/PID:          [id number]         PCP:  Accession/Order#: [id number]     Department Location:  [facility]  YOB: 1935       Fellow:  Gender:           F                Nurse:  Admit Date:                        Sonographer:         Mariaa Gonzalez RDCS,  RDMS  Admission Status: Outpatient       Additional Staff:  Height:           162.56 cm        CC Report to:        Gamaliel Wallace MD  Weight:           62.60 kg         Study Type:          Echocardiogram  BSA:              1.67 m2  Blood Pressure: 186 /67 mmHg    Diagnosis/ICD: R06.02-Shortness of breath  Indication:    SOB  Procedure/CPT: Echo Complete w Full Doppler-[id number]    Patient History:  Pertinent History: HTN, CVA, Dyslipidemia, CHF and Hyperlipidemia. hypothyroid,  SOB, CKDIII.    Study Detail: The following Echo studies were performed: 2D, M-Mode, Doppler and  color flow. Technically challenging study due to body habitus. The  patient was awake.      PHYSICIAN INTERPRETATION:  Left Ventricle: The left ventricular systolic function is normal, with an estimated ejection fraction of 65-70%. There are no regional wall motion abnormalities. The left ventricular cavity size is normal. There is mild to moderate concentric left ventricular hypertrophy. Spectral Doppler shows an impaired relaxation pattern of left ventricular diastolic filling.  Left Atrium: The left atrium is mildly dilated.  Right Ventricle: The right ventricle is normal in size. There is normal right ventricular global systolic function.  Right Atrium: The right atrium is normal in size.  Aortic Valve: The aortic valve is probably trileaflet. There is moderate aortic valve cusp calcification. There is moderate aortic valve thickening. There is evidence of mild aortic valve stenosis.  There is no evidence of aortic valve regurgitation. The peak instantaneous gradient of the aortic valve is 15.1 mmHg. The mean gradient of the aortic valve is 8.0 mmHg.  Mitral Valve: The mitral valve is normal in structure. There is moderate to severe mitral annular  calcification. There is mild mitral valve regurgitation.  Tricuspid Valve: The tricuspid valve is structurally normal. There is mild tricuspid regurgitation.  Pulmonic Valve: The pulmonic valve is not well visualized. There is physiologic pulmonic valve regurgitation.  Pericardium: There is no pericardial effusion noted.  Aorta: The aortic root was not well visualized.      CONCLUSIONS:  1. Left ventricular systolic function is normal with a 65-70% estimated ejection fraction.  2. Spectral Doppler shows an impaired relaxation pattern of left ventricular diastolic filling.  3. There is moderate to severe mitral annular calcification.  4. Mild aortic valve stenosis.  5. There is moderate aortic valve cusp calcification.    QUANTITATIVE DATA SUMMARY:  2D MEASUREMENTS:  Normal Ranges:  Ao Root d:     3.20 cm    (2.0-3.7cm)  LAs:           3.20 cm    (2.7-4.0cm)  IVSd:          1.34 cm    (0.6-1.1cm)  LVPWd:         1.24 cm    (0.6-1.1cm)  LVIDd:         3.96 cm    (3.9-5.9cm)  LVIDs:         2.60 cm  LV Mass Index: 108.7 g/m2  LV % FS        34.3 %    LA VOLUME:  Normal Ranges:  LA Vol A4C:        58.6 ml    (22+/-6mL/m2)  LA Vol A2C:        58.8 ml  LA Vol BP:         61.8 ml  LA Vol Index A4C:  35.1ml/m2  LA Vol Index A2C:  35.2 ml/m2  LA Vol Index BP:   37.0 ml/m2  LA Area A4C:       20.5 cm2  LA Area A2C:       19.5 cm2  LA Major Axis A4C: 6.1 cm  LA Major Axis A2C: 5.5 cm  LA Volume Index:   34.0 ml/m2    RA VOLUME BY A/L METHOD:  Normal Ranges:  RA Vol A4C:        38.0 ml    (8.3-19.5ml)  RA Vol Index A4C:  22.7 ml/m2  RA Area A4C:       15.1 cm2  RA Major Axis A4C: 5.1 cm    M-MODE MEASUREMENTS:  Normal Ranges:  Ao Root: 3.00 cm (2.0-3.7cm)  LAs:     4.60 cm (2.7-4.0cm)    AORTA MEASUREMENTS:  Normal Ranges:  Asc Ao, d: 3.10 cm (2.1-3.4cm)    LV SYSTOLIC FUNCTION BY 2D PLANIMETRY (MOD):  Normal Ranges:  EF-A4C View: 68.9 % (>=55%)  EF-A2C View: 75.6 %  EF-Biplane:  72.1 %    LV DIASTOLIC FUNCTION:  Normal  Ranges:  MV Peak E:        1.01 m/s    (0.7-1.2 m/s)  MV Peak A:        1.26 m/s    (0.42-0.7 m/s)  E/A Ratio:        0.80        (1.0-2.2)  MV e'             0.08 m/s    (>8.0)  MV lateral e'     0.09 m/s  MV medial e'      0.06 m/s  MV A Dur:         161.00 msec  E/e' Ratio:       13.47       (<8.0)  PulmV Sys John:    57.50 cm/s  PulmV Membreno John:   39.60 cm/s  PulmV S/D John:    1.50  PulmV A Revs John: 37.20 cm/s  PulmV A Revs Dur: 172.00 msec    MITRAL VALVE:  Normal Ranges:  MV DT: 267 msec (150-240msec)    AORTIC VALVE:  Normal Ranges:  AoV Vmax:                1.94 m/s  (<=1.7m/s)  AoV Peak PG:             15.1 mmHg (<20mmHg)  AoV Mean P.0 mmHg  (1.7-11.5mmHg)  LVOT Max John:            1.12 m/s  (<=1.1m/s)  AoV VTI:                 49.60 cm  (18-25cm)  LVOT VTI:                28.10 cm  LVOT Diameter:           2.00 cm   (1.8-2.4cm)  AoV Area, VTI:           1.78 cm2  (2.5-5.5cm2)  AoV Area,Vmax:           1.81 cm2  (2.5-4.5cm2)  AoV Dimensionless Index: 0.57    RIGHT VENTRICLE:  RV 1   3.4 cm  RV 2   2.3 cm  RV 3   6.4 cm  TAPSE: 14.7 mm  RV s'  0.12 m/s    TRICUSPID VALVE/RVSP:  Normal Ranges:  Peak TR Velocity: 3.30 m/s  Est. RA Pressure: 3 mmHg  RV Syst Pressure: 46.6 mmHg (< 30mmHg)  IVC Diam:         1.00 cm    PULMONIC VALVE:  Normal Ranges:  PV Max John: 0.9 m/s  (0.6-0.9m/s)  PV Max PG:  3.3 mmHg    Pulmonary Veins:  PulmV A Revs Dur: 172.00 msec  PulmV A Revs John: 37.20 cm/s  PulmV Membreno John:   39.60 cm/s  PulmV S/D Ojhn:    1.50  PulmV Sys John:    57.50 cm/s      [id number] Mariann Carbone MD  Electronically signed on 3/6/2023 at 5:39:36 PM    ECHOCARDIOGRAM:   CONCLUSIONS:   - Exam indication: Stroke   - The left ventricle is normal in size. There is moderate left ventricular   hypertrophy. Left ventricular systolic function is normal. EF = 60 ± 5% (2D   biplane) Definity contrast used for endocardial border detection. Grade I left   ventricular diastolic dysfunction.   - The right  ventricle is normal in size. Right ventricular systolic function is   normal.   - The left atrial cavity is moderately dilated.   - Exam was compared with the prior  echocardiographic exam performed on   4/25/2022     Stress Testing THU(NDN6256:1:1825): No results found for this or any previous visit from the past 1825 days.    Cardiac Catheterization:  7/31/2023  Coronary Anatomy: Right Dominant     Injection Site(s): Left Main Coronary Artery and Right Coronary Artery     LMT: _ The LMT is normal.   _ There was mild diffuse disease.     LAD:   _ The LAD is normal.   _ There was mild diffuse disease.     LCX: _ The proximal circumflex is narrowed 80 % - focal disease.   _ The 1st obtuse marginal circumflex - moderate diffuse disease and small     caliber vessel.     RAMUS: _ The Ramus is Absent.     RCA:   _ The proximal RCA is narrowed 50 % - focal disease.     Cardiac Scoring: No results found for this or any previous visit from the past 1825 days.    AAA : No results found for this or any previous visit from the past 1825 days.    OTHER: No results found for this or any previous visit from the past 1825 days.      LAST IMAGING RESULTS   ECG 12 Lead  Sinus bradycardia  Otherwise normal ECG  When compared with ECG of 05-AUG-2023 09:03,  Previous ECG has undetermined rhythm, needs review  QT has shortened  See ED provider note for full interpretation and clinical correlation  Confirmed by Diana Sanchez ([id number]) on 2/12/2024 6:47:20 PM  XR chest 1 view  Narrative: Interpreted By:  Manny Davila,   STUDY:  XR CHEST 1 VIEW;  2/12/2024 5:07 pm      INDICATION:  Signs/Symptoms:chest pain.      COMPARISON:  December 4, 2017 and February 24, 2023 chest radiographs      ACCESSION NUMBER(S):  [id number]      ORDERING CLINICIAN:  BILL RAJPUT      FINDINGS:  AP radiograph of the chest was provided.      Overlapping radiopaque leads      CARDIOMEDIASTINAL SILHOUETTE:  Grossly stable cardiomediastinal  silhouette allowing for difference  in patient position and technique.      LUNGS:  Slight increase crowding of the markings with platelike densities  left chest favoring platelike atelectasis. No well-delineated  consolidative pneumonia, edema, or effusion.      ABDOMEN:  No remarkable upper abdominal findings.      BONES:  No acute osseous changes.      Impression: 1.  Low lung volumes with newly seen platelike atelectasis.              MACRO:  None      Signed by: Manny Davila 2/12/2024 5:47 PM  Dictation workstation:   [id number]      Assessment:    Patient Active Problem List   Diagnosis    Acute alteration in mental status    Anemia    Primary generalized (osteo)arthritis    Atherosclerosis of native coronary artery of native heart without angina pectoris    Benign essential HTN    Blunt trauma of lower leg, left, initial encounter    Chronic pain of both knees    Chronic pain of right ankle    Current mild episode of major depressive disorder without prior episode (CMS/HCC)    CVA (cerebral vascular accident) (CMS/HCC)    Eczema    Gout flare    Hard of hearing    Heart failure (CMS/HCC)    Hyperlipemia    Hypertriglyceridemia    Hypothyroidism    Temporal arteritis (CMS/HCC)    Stage 3b chronic kidney disease (CMS/HCC)    Hypokalemia    Anxiety    Arteritic AION (anterior ischemic optic neuropathy), left    Insomnia    Irritable bowel syndrome with diarrhea    NSTEMI (non-ST elevation myocardial infarction) (CMS/HCC)    Numbness    Osteoarthritis of multiple joints    Osteoporosis    Physical deconditioning    Pre-ulcerative corn or callous    Sleep apnea    Vision changes    Vitamin D deficiency    Coronary artery disease of native artery of native heart with stable angina pectoris (CMS/HCC)    Acute ischemic left MCA stroke (CMS/HCC)    TIA (transient ischemic attack)    Type 2 diabetes mellitus without complication, without long-term current use of insulin (CMS/HCC)    Long term (current) use of  aspirin    Hypertensive heart disease without heart failure    History of falling    Diverticulosis of intestine, part unspecified, without perforation or abscess without bleeding    Sensory ataxia    Chronic tension-type headache, intractable    Dementia without behavioral disturbance (CMS/HCC)    Adverse effect of corticosteroids    Chest discomfort    Diverticulitis of intestine    Dyspnea on exertion    Headache disorder    History of hypercholesterolemia    Infection of skin    Localized edema    Mixed anxiety depressive disorder    Onychomycosis of toenail    Osteoarthritis of left knee    Pain in upper limb    Sacral back pain    Cachexia (CMS/HCC)    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 side, unspecified etiology (CMS/HCC)    Partial epilepsy with impairment of consciousness, intractable (CMS/HCC)    Acute chest pain        Assessment/Plan   Problem List Items Addressed This Visit             ICD-10-CM    Heart failure (CMS/HCC) I50.9    Relevant Medications    metoprolol tartrate (Lopressor) tablet 25 mg    nitroglycerin (Nitrostat) SL tablet 0.4 mg    amLODIPine (Norvasc) tablet 2.5 mg    clopidogrel (Plavix) tablet 75 mg    Other Relevant Orders    Transthoracic Echo (TTE) Complete    * (Principal) Acute chest pain - Primary R07.9    Relevant Orders    Transthoracic Echo (TTE) Complete     87 YO women admitted with Chest pain, stable angina in the setting of small vessel disease  CAD, [facility] 7/31/2023  HTN, HLD  CKD  Hx of CVA  VIRGINIE  Type II DM    Plan:  Echocardiogram 8/2023 reviewed preserved LVEF; Repeat per primary team pending, murmur appreciated on physical exam  HS troponin negative, no ECG changes, pain is somewhat atypical however ongoing and unchanged  Recommend to treat as stable angina in the setting of known small vessel disease  Start Isosorbide mononitrate 30 mg daily when BP will allow  Continue all home going medications as tolerated  Recommend OP follow up with  Cardiology    Further recommendations pending Echo.   Per Nephew patient is a DNR and paperwork previously provided to  during last hospitalization to [facility].     Discussed with ANUSHKA Reed    Thank you for the opportunity to participate in the care of your patient.  Do not hesitate to call if you have any questions.    Electronically signed by ANUSHKA Kinney on 2/13/2024 at 10:56 AM  ____________________________________________________________  Division of Cardiovascular Medicine  [facility]  [facility]

## 2024-02-13 NOTE — DISCHARGE INSTR - OTHER ORDERS
Thank you for choosing [facility] for your Health Care needs.  Also, thank you for allowing us to take you and your families preferences into account when determining your discharge plan.  Stay well!    Your Care Transitions Team Member: Cece Raymond Amanda or Richard [phone]     For questions about medications listed on your discharge instructions, please call the Nurses Station at [phone].       [facility] [place][note 2] is a free community service that provides information about social health and government resources 24 hours a day.  It provides Human services agencies, food and shelter providers,  resources, special services for senior and volunteer opportunities.  [email or web address]

## 2024-02-13 NOTE — PROGRESS NOTES
Critical Care Note    HPI: 88 year old female with hx of htn, dm, hypothyroidism, CKD here with chest pain.  No other complaints.  BP elevated to 200s on arrival, started on hydralazine.  HR low, toprolol with holding orders.          ROS:   10 system ROS negative outside of HPI.          Physical Exam:  General: comfortable  cardiac: sinus  resp: cta  Gi:soft  Neuro:nonfocal     Assessment + Plan  Chest pain, rule out acute MI  Htn urgency  Ckd  Htn  Hld  hypothyroidism     Neuro  On seroquel historically, can restart if indicated  There was a suspicion of dementia on PMD visit    CV  Hydralazine 20 q4 Prn elevated pressure  Nitroglycerin paste started  Tropolol with holding orders  Asa  Cardiology consult, echo, EKG if chest pain     Pulm  No issues     Renal  Ckd towards baseline     ID  No sirs   No indication for abx     GI   Diet as tolerated     Endocrine  Synthroid 150mcg at home  Dm on tresiba, can hold start ssi as indicated     Heme/DVT ppx      Code Status: full     Critical care Time: 34

## 2024-02-13 NOTE — CARE PLAN
The clinical goals for the shift include Patient will not fall this shift    Patient was in chair upon assuming care. Patient is alert and oriented but has moments of forgetfulness and confusion. Patients nephew called and stated patient did one week of rehab in [place] and 2 months at [facility] prior to returning home and kicking home PT and OT out. Patient states that she falls 4 times and month and uses a walker with a seat at home and is often short of breath with any exertion. Patient did ambulate to the bed side commode multiple times this shift with walker and heavy assistance. Patient continent and produced multiple bowl movements today. Patient than returned back to bed after lunch to rest before moving to Med Surg. Upon moving patient in wheel chair patient asked this RN for her insulin, no insulin had been ordered at this point. Once the patient was transferred this nurse secured chart the primary nurse, resource nurse and provider for home medication review and insulin.

## 2024-02-13 NOTE — NURSING NOTE
Recd report from Tammy RN, ICU    1441 pt in Room 237, assessment complete, oriented to room, call bell within reach    1621 nephew @ bedside, copy of Living Will/ Power of  placed in chart

## 2024-02-13 NOTE — CARE PLAN
The patient's goals for the shift include      The clinical goals for the shift include Patient will not fall this shift    Transfer from ICU, pleasantly confused/ forgetful, very talkative.  Nephew in to visit.  Plan to dc to [place] 2/15.

## 2024-02-13 NOTE — NURSING NOTE
Dr Cortez notified of icu admission for CP. Pt is hypertensive with BP of 228/89 with heart rate of 58. Ordered to apply nitropaste and recheck bp in half hour. New order for hydralazine prn for sbp greater than 180. Pt c/o left sided rib pain occassionally, does not want medicated with tylenol at this time.  .

## 2024-02-13 NOTE — PROGRESS NOTES
Occupational Therapy    Evaluation/Treatment    Patient Name: Stella WILKES Saturday  MRN: [id number]  : 1935  Today's Date: 24  Time Calculation  Start Time: 828  Stop Time: 908  Time Calculation (min): 40 min     Assessment:  OT Assessment: Pt. displays a decline from her prior level of functioning for self care skills & functional transfers due to her hospitalization for acute chest pain & HTN.  OT intervention is indicated to address the pt.'s deficit areas to restore the pt. to her previous level of functioning for self care skills & functional transfers.  Prognosis: Good  Barriers to Discharge: Decreased caregiver support  Evaluation/Treatment Tolerance:  Limited by shortness of breath  Medical Staff Made Aware: Yes  End of Session Communication: Bedside nurse  End of Session Patient Position: Up in chair, Alarm on  Prognosis: Good  Barriers to Discharge: Decreased caregiver support  Evaluation/Treatment Tolerance: Limited by shortness of breath  Medical Staff Made Aware: Yes  Plan:  Treatment Interventions: ADL retraining, Functional transfer training, Endurance training, Cognitive reorientation, Patient/family training, Equipment evaluation/education, Neuromuscular reeducation, Compensatory technique education  OT Frequency: 3 times per week  OT Discharge Recommendations: Moderate intensity level of continued care  OT Recommended Transfer Status: Minimal assist  OT - OK to Discharge: Yes Based on completed evaluation and care plan recommendations, no barriers to discharge to next site of care    Treatment Interventions: ADL retraining, Functional transfer training, Endurance training, Cognitive reorientation, Patient/family training, Equipment evaluation/education, Neuromuscular reeducation, Compensatory technique education    Subjective   Current Problem:  1. Acute chest pain  Transthoracic Echo (TTE) Complete    Transthoracic Echo (TTE) Complete      2. Heart failure, unspecified HF chronicity,  unspecified heart failure type (CMS/HCC)  Transthoracic Echo (TTE) Complete    Transthoracic Echo (TTE) Complete        General:   OT Received On: 02/13/24  General  Reason for Referral: Impaired self care skills & functional transfers due to hospitalization for acute chest pain & HTN.  Referred By: Radhames Blanco CNP  Past Medical History Relevant to Rehab: HTN, DM, CKD, foot surgery, gall bladder surgery, eye surgery, total thyroidectomy, tonsillectomy  Family/Caregiver Present: No  Prior to Session Communication: Bedside nurse  Patient Position Received: Alarm on, Bed, 2 rail up  Preferred Learning Style: verbal  General Comment: Pt. wears a R knee brace.  Precautions:  Medical Precautions: Fall precautions  Precautions Comment: safety precautions, fall risk, telemetry, chair alarm  Vital Signs:  Heart Rate: 67  Heart Rate Source: Monitor  SpO2: 99 %  BP: 131/60  BP Location: Right arm  BP Method: Automatic  Patient Position: Lying  Pain:  Pain Assessment  Pain Assessment: 0-10  Pain Score: 0 - No pain    Objective   Cognition:  Memory: Impaired recall.  Pt. told this therapist repeated  times that she was at [place] for rehab & then went to assisted living for a month.  Safety/Judgement: mod cues for safety       Home Living:  Type of Home: House  Lives With: Alone  Home Adaptive Equipment: rollator, shower chair, cane, grab bar in the tub, medical alert.  Home Layout: One level, Laundry in basement  Home Access: Ramped entrance.  Has 10 steps with 1 railing to the basement.  Bathroom Shower/Tub: Tub/shower unit  Bathroom Toilet: Standard  Bathroom Equipment: grab bar in the tub, shower chair  Prior Function:  Level of [place]: Independent with ADLs and functional transfers, Independent with homemaking with ambulation  Receives Help From: Pt.'s nephew assists her at times as needed. Pt. reported that her nephew pays her bills & assists her with managing her finances. Pt. has a friend who takes her grocery  shopping.  ADL Assistance: Independent  Homemaking Assistance: Pt. prepares simple meals. Pt. reported doing her laundry by placing it in bag & throwing it down the stairs.  When her laundry is done, she places it in a bag & throws it up the stairs.  Ambulatory Assistance: Modified independent ambulation with use of a rollator.  Pt. reported using a cane on the stairs.  Leisure: Pt. watches tv & likes to read.  Hand Dominance: Right  IADL History:  Current License: No  Mode of Transportation: Friends  ADL:  Eating Assistance: Independent  Grooming Assistance: Close SBA from a sitting position.  Bathing Assistance: Moderate  Bathing Deficit: Setup, Steadying, Verbal cueing, Left lower leg including foot, Right lower leg including foot  UE Dressing Assistance: Minimal  LE Dressing Assistance: Moderate  Toileting Assistance with Device: Minimal  Activities of Daily Living: Grooming  Grooming Level of Assistance: Close SBA with set up for grooming from a sitting position.  Grooming Where Assessed: Chair    UE Bathing  UE Bathing Level of Assistance: S set up  & cues  UE Bathing Where Assessed: bedside chair    LE Bathing  LE Bathing Level of Assistance: Setup, Moderate assistance  LE Bathing Where Assessed: bedside chair  LE Bathing Comments: Pt. required assistance to bathe her distal LE's & CGA/Vimal & cues for balance while performing perineal hygiene care with cues for one UE for support to reduce her risk for falls.    UE Dressing  UE Dressing Level of Assistance: Minimum assistance, Setup  UE Dressing Where Assessed: Chair  UE Dressing Comments: Vimal & cues to assist with donning a hospital gown.    LE Dressing  LE Dressing: Yes  Sock Level of Assistance: Minimum assistance, Setup  LE Dressing Where Assessed: Chair  LE Dressing Comments: Pt. required assistance to begin the pull up brief over her B LE's & CGA/Vimal & cues for standing  balance while adjusting the brief over her hips.     Activity  Tolerance:  Endurance: Decreased tolerance for upright activites (Pt. displays shortness of breath with minimal activity, however her O2 saturation level remained above 90% at this time.)       Bed Mobility/Transfers: Bed Mobility  Bed Mobility: Yes  Bed Mobility 1  Level of Assistance 1: Close SBA & cues with use of a bed rail    Transfer 1  Technique 1: Stand pivot, Sit to stand  Transfer Device 1: Walker, Gait belt  Transfer Level of Assistance 1: Minimum assistance    Standing Balance:  Static Standing Balance  Static Standing-Level of Assistance: CGA/Vimal & cues for standing balance during self care with a FWW     Vision:Vision - Basic Assessment  Current Vision: Wears glasses all the time. Pt. reported poor vision in her L eye & decreased vision in her R eye.     Strength:  Strength Comments: Grossly G- B UE strength      Hand Function:  Hand Function  Gross Grasp: Functional  Coordination: R UE WFL.  L UE minimally impaired.  Extremities: RUE   RUE : Within Functional Limits and LUE   LUE: Within Functional Limits    Outcome Measures: [facility] Daily Activity  Putting on and taking off regular lower body clothing: A lot  Bathing (including washing, rinsing, drying): A lot  Putting on and taking off regular upper body clothing: A little  Toileting, which includes using toilet, bedpan or urinal: A little  Taking care of personal grooming such as brushing teeth: A little  Eating Meals: None  Daily Activity - Total Score: 17      Education Documentation  Body Mechanics, taught by Ericka Ahumada OT at 2/13/2024 12:30 PM.  Learner: Patient  Readiness: Acceptance  Method: Explanation, Demonstration  Response: Verbalizes Understanding, Needs Reinforcement, Demonstrated Understanding  Comment: Education on safety with transfers, safety with balance & energy conservation.    ADL Training, taught by Ericka Ahumada OT at 2/13/2024 12:30 PM.  Learner: Patient  Readiness: Acceptance  Method: Explanation,  Demonstration  Response: Verbalizes Understanding, Needs Reinforcement, Demonstrated Understanding  Comment: Education on safety with transfers, safety with balance & energy conservation.      Goals:  Encounter Problems       Encounter Problems (Active)       ADLs       Patient with complete upper body dressing with S set up.       Start:  02/13/24    Expected End:  02/27/24            Patient will complete daily grooming tasks S with A device for balance.       Start:  02/13/24    Expected End:  02/27/24            S LE dressing with use of adaptive equipment PRN.       Start:  02/13/24    Expected End:  02/27/24            S toileting with A/safety devices.       Start:  02/13/24    Expected End:  02/27/24            S bathing with A/safety devices.       Start:  02/13/24    Expected End:  02/27/24               BALANCE       S & good safety with standing balance as needed for ADL task performance with A device as indicated.       Start:  02/13/24    Expected End:  02/27/24                 TRANSFERS       S & good safety with transfers to the bed, chair & toilet with A/safety devices.       Start:  02/13/24    Expected End:  02/27/24

## 2024-02-13 NOTE — PROGRESS NOTES
TCC spoke with patient regarding discharge planning and home going needs. Patient lives  alone in a single story house with a basement.  Her laundry room is downstairs.  She will put her laundry in a bag and toss her laundry down the stairs.  She uses the hand rail and cane to walk down.  When completed, she tosses the bag up and does the same going up. She says she checks her blood glucose before each meal-3x daily.   Her nephews help her with transportation, filling her pill box (every Sunday),  and paying bills.  She also has a friend that drives her to the store or [employer].   Patient says uses a Rolator as well.  She doesn't cook for herself anymore.  She buys TV Dinners.    Confirmed with patient PCP is  Dr. Marika Peña.  Discharge Plan is for patient to be determined after therapy evaluates.  TCC will continue to follow for changes in discharge planning needs.     1:40 PM Patient informed of recommendation for MOD level of therapy/Skilled Nursing Facility upon discharge.  Printed insurance choice list for patient.  She asked TCC to call ismael Paige for recommendations.  Royal informed and his SNF choices are:  1.  [facility] and 2. [facility]  TCC sending referrals.         2:20 pm  Patient accepted at [facility] 2/15.

## 2024-02-13 NOTE — PROGRESS NOTES
Physical Therapy    Physical Therapy Evaluation    Patient Name: Stella WILKES Saturday  MRN: [id number]  Today's Date: 2/13/2024   Time Calculation  Start Time: 1005  Stop Time: 1028  Time Calculation (min): 23 min    Assessment/Plan   PT Assessment  PT Assessment Results: Decreased strength, Decreased endurance, Impaired balance, Decreased mobility, Decreased cognition, Impaired judgement, Decreased safety awareness, Impaired hearing, Impaired vision  Rehab Prognosis: Good  Evaluation/Treatment Tolerance: Patient limited by fatigue  End of Session Communication: Bedside nurse  Assessment Comment: Pleasant 88 y.o presents with impaired mobility, weakness and decreased endurance. Pt. lives alone and is normally IND with Rollator. Pt. currently requires Vimal for transfers and would benefit from additional PT to address above noted limitations and prevent further decline.  End of Session Patient Position: Up in chair, Alarm on  IP OR SWING BED PT PLAN  Inpatient or Swing Bed: Inpatient  PT Plan  Treatment/Interventions: Bed mobility, Transfer training, Gait training, Stair training, Balance training, Strengthening, Endurance training, Therapeutic exercise, Therapeutic activity, Home exercise program  PT Plan: Skilled PT  PT Frequency: 3 times per week  PT Discharge Recommendations: Moderate intensity level of continued care  PT Recommended Transfer Status: Assist x1  PT - OK to Discharge: Yes Based on completed evaluation and care plan recommendations, no barriers to discharge to next site of care      Subjective   General Visit Information:  General  Reason for Referral: impaired mobility  Referred By: ANUSHKA Burris  Past Medical History Relevant to Rehab: HTN, DM, CKD, foot surgery, gall bladder surgery, eye surgery, total thyroidectomy, tonsillectomy  Prior to Session Communication: Bedside nurse  Patient Position Received: Up in chair, Alarm off, caregiver present  General Comment: tele, pulse ox, [place]  Home  "Living:  Home Living  Type of Home: House  Lives With: Alone  Home Adaptive Equipment:  (rollator, shower chair, cane, grab bar in the tub, medical alert)  Home Layout: One level (laundry in basement)  Home Access: Ramped entrance (Has 10 steps with 1 railing to the basement.)  Bathroom Shower/Tub: Tub/shower unit  Bathroom Toilet: Standard  Bathroom Equipment:  (grab bar in the tub, shower chair)  Prior Level of Function:  Prior Function Per Pt/Caregiver Report  Level of [place]: Independent with ADLs and functional transfers, Independent with homemaking with ambulation  Receives Help From:  (Per EMR: Pt.'s nephew assists her at times as needed. Pt. reported that her nephew pays her bills & assists her with managing her finances. Pt. has a friend who takes her grocery shopping.)  ADL Assistance: Independent  Homemaking Assistance:  (Pt. prepares simple meals. Pt. reported doing her laundry by placing it in bag & throwing it  down the stairs.)  Ambulatory Assistance:  (Modified independent ambulation with use of a rollator. Reji with cane in basement)  Precautions:  Precautions  Hearing/Visual Limitations: vision impairment  Medical Precautions: Fall precautions (reports multiple falls)  Vital Signs:  Vital Signs  Heart Rate: 60  SpO2: 100 %    Objective   Pain:  Pain Assessment  Pain Assessment: 0-10  Pain Score: 0 - No pain  Cognition:  Cognition  Overall Cognitive Status:  (Ax0 x 3 but pt. forgetful at times)    General Assessments:     Activity Tolerance  Endurance: Decreased tolerance for upright activites (pt. SOB with activity) SPO2 >92% throughout session  Rate of Perceived Exertion (RPE): pt. reported \"alot\" of effort with transfer <> toilet    Sensation  Sensation Comment: denies deficits    Strength  Strength Comments: BLE: grossly 4-/5  Coordination  Movements are Fluid and Coordinated: Yes    Static Standing Balance  Static Standing-Comment/Number of Minutes: F+ with BUE support  Dynamic Standing " Balance  Dynamic Standing-Comments: F+ with BUE support; able to assist with pericare using 1 UE for support with assist  Functional Assessments:  Transfers  Transfer: Yes  Transfer 1  Technique 1: Sit to stand, Stand to sit  Transfer Device 1: Walker  Transfer Level of Assistance 1: Minimum assistance, Moderate verbal cues  Transfers 2  Technique 2: Stand pivot (chair<>toilet)  Transfer Device 2: Walker  Transfer Level of Assistance 2: Minimum assistance    Ambulation/Gait Training  Ambulation/Gait Training Performed: Yes  Ambulation/Gait Training 1  Gait Support Devices: Gait belt  Assistance 1: Contact guard  Quality of Gait 1: Decreased step length, Diminished heel strike  Comments/Distance (ft) 1: 3'  Extremity/Trunk Assessments:  RLE   RLE : Within Functional Limits  LLE   LLE : Within Functional Limits  Outcome Measures:  [facility] Basic Mobility  Turning from your back to your side while in a flat bed without using bedrails: A little  Moving from lying on your back to sitting on the side of a flat bed without using bedrails: A little  Moving to and from bed to chair (including a wheelchair): A little  Standing up from a chair using your arms (e.g. wheelchair or bedside chair): A little  To walk in hospital room: A little  Climbing 3-5 steps with railing: A lot  Basic Mobility - Total Score: 17    Encounter Problems       Encounter Problems (Active)       Balance       STG - Maintains dynamic standing balance with upper extremity support >= 5 min with >=good- balance        Start:  02/13/24    Expected End:  02/27/24               Mobility       LTG - Patient will ambulate community distance with SUP with WW       Start:  02/13/24    Expected End:  02/27/24            STG - Patient will ascend and descend a flight of stairs with SBA with 1 rail and cane        Start:  02/13/24    Expected End:  02/27/24               Mobility       LTG - Patient will ambulate household distance with SUP with cane        Start:   02/13/24    Expected End:  02/27/24               Pain - Adult          Transfers       STG - Patient will perform bed mobility IND        Start:  02/13/24    Expected End:  02/27/24            STG - Patient will transfer sit to and from stand with SUP and WW        Start:  02/13/24    Expected End:  02/27/24                   Education Documentation  Home Exercise Program, taught by Abida Mahan, PT at 2/13/2024 12:16 PM.  Learner: Patient  Readiness: Acceptance  Method: Explanation  Response: Verbalizes Understanding  Comment: Educated pt. on PT POC    Education Comments  No comments found.

## 2024-02-14 DIAGNOSIS — E55.9 VITAMIN D DEFICIENCY: ICD-10-CM

## 2024-02-14 DIAGNOSIS — D64.9 ANEMIA, UNSPECIFIED: ICD-10-CM

## 2024-02-14 PROBLEM — I20.89 STABLE ANGINA (CMS-HCC): Status: ACTIVE | Noted: 2024-02-14

## 2024-02-14 LAB
ANION GAP SERPL CALC-SCNC: 14 MMOL/L (ref 10–20)
BUN SERPL-MCNC: 22 MG/DL (ref 6–23)
CALCIUM SERPL-MCNC: 9 MG/DL (ref 8.6–10.3)
CHLORIDE SERPL-SCNC: 108 MMOL/L (ref 98–107)
CO2 SERPL-SCNC: 20 MMOL/L (ref 21–32)
CREAT SERPL-MCNC: 1.36 MG/DL (ref 0.5–1.05)
EGFRCR SERPLBLD CKD-EPI 2021: 38 ML/MIN/1.73M*2
ERYTHROCYTE [DISTWIDTH] IN BLOOD BY AUTOMATED COUNT: 13.7 % (ref 11.5–14.5)
GLUCOSE BLD MANUAL STRIP-MCNC: 146 MG/DL (ref 74–99)
GLUCOSE BLD MANUAL STRIP-MCNC: 152 MG/DL (ref 74–99)
GLUCOSE BLD MANUAL STRIP-MCNC: 153 MG/DL (ref 74–99)
GLUCOSE BLD MANUAL STRIP-MCNC: 157 MG/DL (ref 74–99)
GLUCOSE SERPL-MCNC: 160 MG/DL (ref 74–99)
HCT VFR BLD AUTO: 32.1 % (ref 36–46)
HGB BLD-MCNC: 10.4 G/DL (ref 12–16)
MCH RBC QN AUTO: 29.6 PG (ref 26–34)
MCHC RBC AUTO-ENTMCNC: 32.4 G/DL (ref 32–36)
MCV RBC AUTO: 92 FL (ref 80–100)
NRBC BLD-RTO: 0 /100 WBCS (ref 0–0)
PLATELET # BLD AUTO: 197 X10*3/UL (ref 150–450)
POTASSIUM SERPL-SCNC: 4.1 MMOL/L (ref 3.5–5.3)
RBC # BLD AUTO: 3.51 X10*6/UL (ref 4–5.2)
SODIUM SERPL-SCNC: 138 MMOL/L (ref 136–145)
WBC # BLD AUTO: 10.1 X10*3/UL (ref 4.4–11.3)

## 2024-02-14 PROCEDURE — 80048 BASIC METABOLIC PNL TOTAL CA: CPT | Mod: IPSPLIT

## 2024-02-14 PROCEDURE — 2500000002 HC RX 250 W HCPCS SELF ADMINISTERED DRUGS (ALT 637 FOR MEDICARE OP, ALT 636 FOR OP/ED): Mod: IPSPLIT

## 2024-02-14 PROCEDURE — 99232 SBSQ HOSP IP/OBS MODERATE 35: CPT

## 2024-02-14 PROCEDURE — 99232 SBSQ HOSP IP/OBS MODERATE 35: CPT | Performed by: NURSE PRACTITIONER

## 2024-02-14 PROCEDURE — 97110 THERAPEUTIC EXERCISES: CPT | Mod: GP,CQ,IPSPLIT

## 2024-02-14 PROCEDURE — 97116 GAIT TRAINING THERAPY: CPT | Mod: GP,CQ,IPSPLIT

## 2024-02-14 PROCEDURE — 2500000004 HC RX 250 GENERAL PHARMACY W/ HCPCS (ALT 636 FOR OP/ED): Mod: IPSPLIT

## 2024-02-14 PROCEDURE — 1200000002 HC GENERAL ROOM WITH TELEMETRY DAILY: Mod: IPSPLIT

## 2024-02-14 PROCEDURE — 82947 ASSAY GLUCOSE BLOOD QUANT: CPT | Mod: IPSPLIT

## 2024-02-14 PROCEDURE — 85027 COMPLETE CBC AUTOMATED: CPT | Mod: IPSPLIT

## 2024-02-14 PROCEDURE — 36415 COLL VENOUS BLD VENIPUNCTURE: CPT | Mod: IPSPLIT

## 2024-02-14 PROCEDURE — 2500000001 HC RX 250 WO HCPCS SELF ADMINISTERED DRUGS (ALT 637 FOR MEDICARE OP): Mod: IPSPLIT

## 2024-02-14 RX ORDER — ISOSORBIDE MONONITRATE 30 MG/1
30 TABLET, EXTENDED RELEASE ORAL DAILY
Status: DISCONTINUED | OUTPATIENT
Start: 2024-02-14 | End: 2024-02-16 | Stop reason: HOSPADM

## 2024-02-14 RX ORDER — METOPROLOL TARTRATE 25 MG/1
12.5 TABLET, FILM COATED ORAL EVERY 12 HOURS SCHEDULED
Status: DISCONTINUED | OUTPATIENT
Start: 2024-02-14 | End: 2024-02-16 | Stop reason: HOSPADM

## 2024-02-14 RX ADMIN — CYANOCOBALAMIN TAB 1000 MCG 1000 MCG: 1000 TAB at 09:54

## 2024-02-14 RX ADMIN — METOPROLOL TARTRATE 25 MG: 25 TABLET, FILM COATED ORAL at 09:55

## 2024-02-14 RX ADMIN — FERROUS SULFATE TAB 325 MG (65 MG ELEMENTAL FE) 1 TABLET: 325 (65 FE) TAB at 09:54

## 2024-02-14 RX ADMIN — LEVOTHYROXINE SODIUM 150 MCG: 75 TABLET ORAL at 09:55

## 2024-02-14 RX ADMIN — POTASSIUM CHLORIDE 20 MEQ: 1500 TABLET, EXTENDED RELEASE ORAL at 09:54

## 2024-02-14 RX ADMIN — CLOPIDOGREL BISULFATE 75 MG: 75 TABLET ORAL at 09:54

## 2024-02-14 RX ADMIN — DOCUSATE SODIUM 100 MG: 100 CAPSULE, LIQUID FILLED ORAL at 20:30

## 2024-02-14 RX ADMIN — ISOSORBIDE MONONITRATE 30 MG: 30 TABLET, EXTENDED RELEASE ORAL at 09:55

## 2024-02-14 RX ADMIN — INSULIN GLARGINE 11 UNITS: 100 INJECTION, SOLUTION SUBCUTANEOUS at 09:58

## 2024-02-14 RX ADMIN — AMLODIPINE BESYLATE 2.5 MG: 2.5 TABLET ORAL at 09:55

## 2024-02-14 RX ADMIN — TIZANIDINE 2 MG: 4 TABLET ORAL at 09:54

## 2024-02-14 RX ADMIN — ENOXAPARIN SODIUM 30 MG: 30 INJECTION SUBCUTANEOUS at 20:29

## 2024-02-14 RX ADMIN — LOSARTAN POTASSIUM 25 MG: 25 TABLET, FILM COATED ORAL at 09:55

## 2024-02-14 RX ADMIN — DOCUSATE SODIUM 100 MG: 100 CAPSULE, LIQUID FILLED ORAL at 09:54

## 2024-02-14 RX ADMIN — ATORVASTATIN CALCIUM 80 MG: 40 TABLET, FILM COATED ORAL at 20:29

## 2024-02-14 ASSESSMENT — COGNITIVE AND FUNCTIONAL STATUS - GENERAL
TOILETING: A LITTLE
MOBILITY SCORE: 16
DAILY ACTIVITIY SCORE: 18
TURNING FROM BACK TO SIDE WHILE IN FLAT BAD: A LITTLE
WALKING IN HOSPITAL ROOM: A LITTLE
DRESSING REGULAR LOWER BODY CLOTHING: A LITTLE
MOVING FROM LYING ON BACK TO SITTING ON SIDE OF FLAT BED WITH BEDRAILS: A LITTLE
DRESSING REGULAR UPPER BODY CLOTHING: A LITTLE
CLIMB 3 TO 5 STEPS WITH RAILING: TOTAL
EATING MEALS: A LITTLE
CLIMB 3 TO 5 STEPS WITH RAILING: A LOT
WALKING IN HOSPITAL ROOM: A LOT
STANDING UP FROM CHAIR USING ARMS: A LITTLE
MOVING TO AND FROM BED TO CHAIR: A LITTLE
MOVING FROM LYING ON BACK TO SITTING ON SIDE OF FLAT BED WITH BEDRAILS: A LITTLE
MOBILITY SCORE: 16
STANDING UP FROM CHAIR USING ARMS: A LITTLE
PERSONAL GROOMING: A LITTLE
MOVING TO AND FROM BED TO CHAIR: A LITTLE
TURNING FROM BACK TO SIDE WHILE IN FLAT BAD: A LITTLE
HELP NEEDED FOR BATHING: A LITTLE

## 2024-02-14 ASSESSMENT — PAIN SCALES - GENERAL
PAINLEVEL_OUTOF10: 0 - NO PAIN
PAINLEVEL_OUTOF10: 0 - NO PAIN

## 2024-02-14 ASSESSMENT — PAIN - FUNCTIONAL ASSESSMENT: PAIN_FUNCTIONAL_ASSESSMENT: 0-10

## 2024-02-14 NOTE — PROGRESS NOTES
Occupational Therapy    Therapy Communication Note    Patient Name: Stella WILKES Saturday  MRN: [id number]  Today's Date: 2/14/2024     Discipline: Occupational Therapy    Missed Visit Reason: Missed Visit Reason: Patient placed on medical hold (Pt. HR low on pressors and BP dropped. Pt. in trendelenberg position and RN deferred treatment at this time.)    Missed Time: Attempt 1336

## 2024-02-14 NOTE — PROGRESS NOTES
Cardiology Progress Note  Patient: Stella WILKES Saturday  Unit/Bed: 237/237-A  YOB: 1935    Admitting Diagnosis: Acute chest pain [R07.9]  Date:  2/12/2024  Attending: Marika Peañ MD      Hospital Day: 2    SUBJECTIVE:   Awake in the chair, denies chest discomfort , appears comfortable         OBJECTIVE  Vitals:   Visit Vitals  /61 (BP Location: Right arm, Patient Position: Lying)   Pulse 66   Temp 36.9 °C (98.4 °F) (Temporal)   Resp 18        Wt Readings from Last 5 Encounters:   02/14/24 57.2 kg (126 lb 1.7 oz)   02/12/24 60.8 kg (134 lb 1.6 oz)   10/11/23 58.1 kg (128 lb)   10/09/23 57.2 kg (126 lb)   09/21/23 61.2 kg (135 lb)       Intake / Output:   I/O last 3 completed shifts:  In: 680 (11.9 mL/kg) [P.O.:680]  Out: 200 (3.5 mL/kg) [Urine:200 (0.1 mL/kg/hr)]  Weight: 57.2 kg      Tele monitoring: SR 68    Physical Examination:    Constitutional:       Appearance: Healthy appearance. Not in distress.   Eyes:      Conjunctiva/sclera: Conjunctivae normal.   Neck:      Vascular: JVD normal.   Pulmonary:      Effort: Pulmonary effort is normal.      Breath sounds: Normal breath sounds.   Cardiovascular:      PMI at left midclavicular line. Normal rate. Regular rhythm. Normal S1. Normal S2.       Murmurs: There is a grade 2/6 systolic murmur at the URSB.      No rub.   Pulses:     Intact distal pulses.   Edema:     Peripheral edema absent.   Abdominal:      General: Bowel sounds are normal.   Musculoskeletal:      Cervical back: Neck supple. Skin:     General: Skin is warm and dry.   Neurological:      Mental Status: Alert and oriented to person, place and time.            LABS:  CMP:   Recent Labs     02/14/24  0633 02/13/24  0442 02/12/24  1715 01/30/24  0836 08/04/23  0719 04/12/23  0700 12/29/22  0843 09/28/22  1042 06/23/22  1148 03/08/22  0832    139 138 142 135* 141 141 138 143 141   K 4.1 4.1 4.2 4.6 3.6 3.3* 4.0 3.9 4.1 4.3   * 111* 107 106 107 110* 107 105 109* 106  "  CO2 20* 19* 19* 23 22 24 24 22 24 26   ANIONGAP 14 13 16 18 10 10 14 15 14 13   BUN 22 23 24* 21 38* 25* 18 23 19 18   CREATININE 1.36* 1.30* 1.40* 1.22* 1.70* 1.23* 1.23* 1.48* 1.18* 1.25*   EGFR 38* 40* 37* 43*  --   --   --   --   --   --    MG  --   --   --   --  2.05  --   --   --   --   --      LIVER ENZYMES:   Recent Labs     02/12/24  1715 08/04/23  0719 12/29/22  0843 09/28/22  1042 06/23/22  1148 03/08/22  0832 10/15/21  0847 04/07/20  0822 01/17/19  0915   ALBUMIN 4.4 3.7 3.9 4.3 4.3 4.3 4.2 4.4 3.7   ALT 11  --  12 14 8 11 6* 8 9   AST 16  --  17 21 14 16 14 17 14   BILITOT 0.9  --  0.4 0.9 0.8 0.4 0.5 0.6 0.3     CBC:  Recent Labs     02/14/24  0634 02/13/24 0442 02/12/24 1715 01/30/24  0836 08/04/23  0719 12/29/22  0843 09/28/22  1042 06/23/22  1148   WBC 10.1 9.3 11.1 9.9 10.7 12.3* 9.2 7.7   HGB 10.4* 10.1* 11.2* 11.5* 9.7* 10.4* 11.2* 11.4*   HCT 32.1* 31.7* 34.0* 38.5 29.7* 33.3* 35.1* 36.6    188 249 241 257 390 226 239   MCV 92 93 90 100 89 94 93 95     COAG:   Recent Labs     02/13/24 0442 08/22/18  1118   INR 1.1 0.9     ABO: No results for input(s): \"ABO\" in the last 04950 hours.  HEME/ENDO:  Recent Labs     02/13/24 0442 02/12/24  1551 01/30/24  0836 08/01/23  0844 04/06/23  1550 11/02/22  0834 10/06/22  1343 04/23/22  1541 03/08/22  0832 04/07/20  0822 02/28/19  1132   FERRITIN  --   --   --   --   --   --  53  --   --   --  50   IRONSAT  --   --   --   --   --   --  24*  --  12*  --  14*   TSH  --   --  0.04*  --  0.05*   < >  --    < > 1.76   < > 0.62   HGBA1C 6.1* 6.6*  --    < >  --    < >  --    < > 6.2*   < > 8.1    < > = values in this interval not displayed.      CARDIAC:   Recent Labs     02/13/24  0442 02/12/24  1807 02/12/24  1715 02/24/23  1130 10/06/22  1343   LDH  --   --   --   --  180   TROPHS 9 9 9  --   --    *  --   --  140*  --        Lipid Panel:  Lab Results   Component Value Date    HDL 22.1 02/13/2024    CHHDL 4.5 02/13/2024    VLDL 37 02/13/2024 "    TRIG 184 (H) 02/13/2024    NHDL 77 02/13/2024          Current Medications:   MEDICATIONS  Infusions:     Scheduled:  amLODIPine, 2.5 mg, Daily  atorvastatin, 80 mg, Nightly  clopidogrel, 75 mg, Daily  cyanocobalamin, 1,000 mcg, Daily  docusate sodium, 100 mg, BID  enoxaparin, 30 mg, q24h  ergocalciferol, 1,250 mcg, Weekly  ferrous sulfate (325 mg ferrous sulfate), 1 tablet, Daily  insulin glargine, 11 Units, Daily  insulin lispro, 0-15 Units, TID with meals  isosorbide mononitrate ER, 30 mg, Daily  levothyroxine, 150 mcg, Daily  losartan, 25 mg, Daily  metoprolol tartrate, 25 mg, q12h LJ  perflutren lipid microspheres, 0.5-10 mL of dilution, Once in imaging  perflutren protein A microsphere, 0.5 mL, Once in imaging  potassium chloride CR, 20 mEq, Daily  sulfur hexafluoride microsphr, 2 mL, Once in imaging  tiZANidine, 2 mg, Daily      PRN:  dextrose 10 % in water (D10W), 0.3 g/kg/hr, Once PRN  dextrose, 25 g, q15 min PRN  dextrose, 25 g, q15 min PRN  glucagon, 1 mg, q15 min PRN  nitroglycerin, 0.4 mg, q5 min PRN  oxygen, , Continuous PRN - O2/gases            Cardiovascular studies:    Echocardiogram 2/1/2024  CONCLUSIONS:   1. Left ventricular systolic function is normal with a 65-70% estimated ejection fraction.   2. Spectral Doppler shows an impaired relaxation pattern of left ventricular diastolic filling.   3. There is moderate concentric left ventricular hypertrophy.   4. Mild aortic valve stenosis.   5. There is moderate aortic valve cusp calcification.    Cardiac catheterization 7/31/2023  IMPRESSION/PLAN   Impression:Single-vessel CAD   - 80% pLCX   - 50% pRCA   Recommended Treatment: Medical Therapy and PCI.   Plan: Trial of medical managment. PCI feasible.       Imaging:     CT angio chest for PE 2/13/2024  IMPRESSION:  1. No findings of acute pulmonary embolism. Moderate aortic arch  calcification. Bronchial thickening. No localizing infiltrate.  Senescent changes.            Assessment:    88  year old female with stable angina in the setting of small vessel disease.  Preserved LV systolic function.  Mild aortic stenosis   CAD, [facility] 7/31/2023  HTN, HLD  CKD  Hx of CVA  Type II DM  Dementia   DNR      Plan:  Imdur 30 mg daily   BP in acceptable range on current antihypertensive regimen   OK to DC from CV perspective   F/U [facility] clinic 1 month       Will sign off   Discussed with ANUSHKA Reed                   Electronically signed by ANUSHKA Simms on 2/14/2024 at 10:28 AM

## 2024-02-14 NOTE — CARE PLAN
The patient's goals for the shift include  pt will tolerate transfers  Problem: Pain  Goal: My pain/discomfort is manageable  Outcome: Progressing     Problem: Safety  Goal: Patient will be injury free during hospitalization  Outcome: Progressing     Problem: Daily Care  Goal: Daily care needs are met  Outcome: Progressing     Problem: Psychosocial Needs  Goal: Demonstrates ability to cope with hospitalization/illness  Outcome: Progressing  Goal: Collaborate with me, my family, and caregiver to identify my specific goals  Outcome: Progressing     Problem: Discharge Barriers  Goal: My discharge needs are met  Outcome: Progressing     Problem: Pain - Adult  Goal: Verbalizes/displays adequate comfort level or baseline comfort level  Outcome: Progressing     Problem: Discharge Planning  Goal: Discharge to home or other facility with appropriate resources  Outcome: Progressing     Problem: Chronic Conditions and Co-morbidities  Goal: Patient's chronic conditions and co-morbidity symptoms are monitored and maintained or improved  Outcome: Progressing     Problem: Skin  Goal: Decreased wound size/increased tissue granulation at next dressing change  Outcome: Progressing  Goal: Participates in plan/prevention/treatment measures  Outcome: Progressing  Goal: Prevent/manage excess moisture  Outcome: Progressing  Goal: Prevent/minimize sheer/friction injuries  Outcome: Progressing  Goal: Promote/optimize nutrition  Outcome: Progressing  Goal: Promote skin healing  Outcome: Progressing     Problem: Fall/Injury  Goal: Verbalize understanding of personal risk factors for fall in the hospital  Outcome: Progressing  Goal: Verbalize understanding of risk factor reduction measures to prevent injury from fall in the home  Outcome: Progressing  Goal: Use assistive devices by end of the shift  Outcome: Progressing  Goal: Pace activities to prevent fatigue by end of the shift  Outcome: Progressing     Problem: Diabetes  Goal: Achieve  decreasing blood glucose levels by end of shift  Outcome: Progressing  Goal: Increase stability of blood glucose readings by end of shift  Outcome: Progressing  Goal: Decrease in ketones present in urine by end of shift  Outcome: Progressing  Goal: Maintain electrolyte levels within acceptable range throughout shift  Outcome: Progressing  Goal: Maintain glucose levels >70mg/dl to <250mg/dl throughout shift  Outcome: Progressing  Goal: No changes in neurological exam by end of shift  Outcome: Progressing  Goal: Learn about and adhere to nutrition recommendations by end of shift  Outcome: Progressing  Goal: Vital signs within normal range for age by end of shift  Outcome: Progressing  Goal: Increase self care and/or family involovement by end of shift  Outcome: Progressing  Goal: Receive DSME education by end of shift  Outcome: Progressing     Problem: Pain  Goal: Takes deep breaths with improved pain control throughout the shift  Outcome: Progressing  Goal: Turns in bed with improved pain control throughout the shift  Outcome: Progressing  Goal: Walks with improved pain control throughout the shift  Outcome: Progressing  Goal: Performs ADL's with improved pain control throughout shift  Outcome: Progressing  Goal: Participates in PT with improved pain control throughout the shift  Outcome: Progressing  Goal: Free from opioid side effects throughout the shift  Outcome: Progressing  Goal: Free from acute confusion related to pain meds throughout the shift  Outcome: Progressing       The clinical goals for the shift include pt will remain free of falls

## 2024-02-14 NOTE — CARE PLAN
The clinical goals for the shift include Ambulate without getting short of breath        0900-Assumed patient care, patient resting in chair, denies any needs at this time    1000-Therapy with patient, patient ambulating the halls with walker     1100- Patient resting in chair with call bell in reach, patient denies any needs at this time.     1230- Patient requesting to return to bed due to being tired. Patient encouraged to eat in recliner and return to bed after for a nap. Patient states she is having a hard time staying awake. Patient ambulated to the bed with assistance of a hospitals student. Patients heart rate bradycardic. Patients blood pressure low. This nurse reached out to Gladys.     1300- Secure chart order that patients heart rate in the 40s is okay as long as she is not symptomatic. Patient resting with eyes closed but easily arousalable. Call bell in reach. Patients blood pressue low. Patient placed in trendelenburg. Np aware    1400-Patient remains resting with eyes closed by easily aroused patient remains in trendelenburg. Call bell in reach, patient denies any needs.     1600-Patient resting in bed with eyes opens, denies any needs. Call bell in reach     1800- Patient to the bedside commode and back to bed after dinner. Patient denies any needs at this time. Patient requesting to have melatonin with night medications. Patients call bell in reach with bed alarm on.

## 2024-02-14 NOTE — PROGRESS NOTES
Stella WILKES Saturday is a 88 y.o. female on day 2 of admission presenting with Stable angina.      Subjective   Pt assessed at bedside. Sitting at the edge of the bed eating breakfast. Denies any acute concerns. Discussed plan of care.        Objective     Last Recorded Vitals  /61 (BP Location: Right arm, Patient Position: Lying)   Pulse 66   Temp 36.9 °C (98.4 °F) (Temporal)   Resp 18   Wt 57.2 kg (126 lb 1.7 oz)   SpO2 98%   Intake/Output last 3 Shifts:    Intake/Output Summary (Last 24 hours) at 2/14/2024 1002  Last data filed at 2/13/2024 1755  Gross per 24 hour   Intake 480 ml   Output --   Net 480 ml       Admission Weight  Weight: 61.6 kg (135 lb 12.9 oz) (02/12/24 1650)    Daily Weight  02/14/24 : 57.2 kg (126 lb 1.7 oz)    Image Results  CT angio chest for pulmonary embolism  Narrative: Interpreted By:  Dewayne Kelley,   STUDY:  CT ANGIO CHEST FOR PULMONARY EMBOLISM;  2/13/2024 10:17 pm      INDICATION:  Signs/Symptoms:sob chest pain.      COMPARISON:  None      ACCESSION NUMBER(S):  [id number]      ORDERING CLINICIAN:  CEM SERRANO      TECHNIQUE:  Helical data acquisition of the chest was obtained after intravenous  administration of intravenous contrast, as per PE protocol. Images  were reformatted in coronal and sagittal planes. Axial and coronal  maximum intensity projection (MIP) images were created and reviewed.      FINDINGS:      Fairly robust atherosclerotic calcification detected. No evidence of  acute pulmonary embolism. There is bronchial thickening. Multilevel  degenerative disc disease is seen. No pneumothorax or pleural  effusion. No adenopathy is seen Multilevel degenerative disc disease  with demineralization. Heart size is mildly enlarged. No thoracic  aneurysm. Mild aortic valve calcification.      No pericardial effusion.      Imaging of the upper abdomen shows unremarkable adrenal glands.      Impression: 1. No findings of acute pulmonary embolism. Moderate aortic  arch  calcification. Bronchial thickening. No localizing infiltrate.  Senescent changes.          MACRO:  None      Signed by: Dewayne Warren 2/14/2024 2:35 AM  Dictation workstation:   [id number]      Physical Exam  HENT:      Head: Normocephalic.      Nose: Nose normal.      Mouth/Throat:      Pharynx: Oropharynx is clear.   Eyes:      Conjunctiva/sclera: Conjunctivae normal.   Cardiovascular:      Rate and Rhythm: Normal rate and regular rhythm.      Heart sounds: Murmur heard.   Pulmonary:      Breath sounds: Decreased breath sounds present.   Abdominal:      General: Bowel sounds are normal.      Palpations: Abdomen is soft.   Musculoskeletal:         General: Normal range of motion.      Cervical back: Normal range of motion and neck supple.   Skin:     General: Skin is dry.   Neurological:      General: No focal deficit present.      Mental Status: She is alert and oriented to person, place, and time.   Psychiatric:         Mood and Affect: Mood normal.         Behavior: Behavior normal.         Relevant Results  Scheduled medications  amLODIPine, 2.5 mg, oral, Daily  atorvastatin, 80 mg, oral, Nightly  clopidogrel, 75 mg, oral, Daily  cyanocobalamin, 1,000 mcg, oral, Daily  docusate sodium, 100 mg, oral, BID  enoxaparin, 30 mg, subcutaneous, q24h  ergocalciferol, 1,250 mcg, oral, Weekly  ferrous sulfate (325 mg ferrous sulfate), 1 tablet, oral, Daily  insulin glargine, 11 Units, subcutaneous, Daily  insulin lispro, 0-15 Units, subcutaneous, TID with meals  isosorbide mononitrate ER, 30 mg, oral, Daily  levothyroxine, 150 mcg, oral, Daily  losartan, 25 mg, oral, Daily  metoprolol tartrate, 25 mg, oral, q12h [facility]  perflutren lipid microspheres, 0.5-10 mL of dilution, intravenous, Once in imaging  perflutren protein A microsphere, 0.5 mL, intravenous, Once in imaging  potassium chloride CR, 20 mEq, oral, Daily  sulfur hexafluoride microsphr, 2 mL, intravenous, Once in imaging  tiZANidine, 2 mg, oral,  Daily      Continuous medications     PRN medications  PRN medications: dextrose 10 % in water (D10W), dextrose, dextrose, glucagon, nitroglycerin, oxygen    Results for orders placed or performed during the hospital encounter of 02/12/24 (from the past 24 hour(s))   Transthoracic Echo (TTE) Complete   Result Value Ref Range    AV pk zaynab 2.40 m/s    AV mn grad 11.0 mmHg    LVOT diam 1.90 cm    LV biplane EF 70 %    MV avg E/e' ratio 14.33     MV E/A ratio 0.60     LA vol index A/L 27.4 ml/m2    Tricuspid annular plane systolic excursion 1.2 cm    RV free wall pk S' 16.80 cm/s    LVIDd 3.19 cm    RVSP 38.5 mmHg    Aortic Valve Area by Continuity of VTI 1.95 cm2    Aortic Valve Area by Continuity of Peak Velocity 1.97 cm2    AV pk grad 23.0 mmHg    LV A4C EF 70.0    POCT GLUCOSE   Result Value Ref Range    POCT Glucose 94 74 - 99 mg/dL   POCT GLUCOSE   Result Value Ref Range    POCT Glucose 100 (H) 74 - 99 mg/dL   POCT GLUCOSE   Result Value Ref Range    POCT Glucose 171 (H) 74 - 99 mg/dL   Basic Metabolic Panel   Result Value Ref Range    Glucose 160 (H) 74 - 99 mg/dL    Sodium 138 136 - 145 mmol/L    Potassium 4.1 3.5 - 5.3 mmol/L    Chloride 108 (H) 98 - 107 mmol/L    Bicarbonate 20 (L) 21 - 32 mmol/L    Anion Gap 14 10 - 20 mmol/L    Urea Nitrogen 22 6 - 23 mg/dL    Creatinine 1.36 (H) 0.50 - 1.05 mg/dL    eGFR 38 (L) >60 mL/min/1.73m*2    Calcium 9.0 8.6 - 10.3 mg/dL   CBC   Result Value Ref Range    WBC 10.1 4.4 - 11.3 x10*3/uL    nRBC 0.0 0.0 - 0.0 /100 WBCs    RBC 3.51 (L) 4.00 - 5.20 x10*6/uL    Hemoglobin 10.4 (L) 12.0 - 16.0 g/dL    Hematocrit 32.1 (L) 36.0 - 46.0 %    MCV 92 80 - 100 fL    MCH 29.6 26.0 - 34.0 pg    MCHC 32.4 32.0 - 36.0 g/dL    RDW 13.7 11.5 - 14.5 %    Platelets 197 150 - 450 x10*3/uL   POCT GLUCOSE   Result Value Ref Range    POCT Glucose 146 (H) 74 - 99 mg/dL          Assessment/Plan        Principal Problem:    Stable angina  Active Problems:    Acute chest pain    #Chest pain 2/2  stable angina   #Hypertension  #HLD  #Chronic diastolic heart failure  -CXR: 1. Low lung volumes with newly seen platelike atelectasis.   -Echo (3/23): 1. Left ventricular systolic function is normal with a 65-70% estimated ejection fraction.  2. Spectral Doppler shows an impaired relaxation pattern of left ventricular diastolic filling.  3. There is moderate to severe mitral annular calcification.  4. Mild aortic valve stenosis.  5. There is moderate aortic valve cusp calcification.  -CT PE: 1. No findings of acute pulmonary embolism. Moderate aortic arch  calcification. Bronchial thickening. No localizing infiltrate.  Senescent changes.  -Repeat echo: 1. Left ventricular systolic function is normal with a 65-70% estimated ejection fraction.   2. Spectral Doppler shows an impaired relaxation pattern of left ventricular diastolic filling.   3. There is moderate concentric left ventricular hypertrophy.   4. Mild aortic valve stenosis.   5. There is moderate aortic valve cusp calcification.  -D-dimer 822  -Trop 9 > 9 > 9  -  -CRP 0.14  -Lipid panel: Mariella 99, HDL 22.1, LDL 40, Trig 184  -Cardiac monitoring  -Cardiology consult, appreciate recs  -Continue amlodipine, atorvastatin, clopidogrel, losartan, metoprolol     #DM Type II  -Hgb A1C: 6.1  -SSI with hypoglycemia protocol  -Monitor BG      #Hx of CVA  -Continue atorvastatin, clopidogrel      #CKD  -Baseline creat ~ 1.2  -Bun/creat 23/1.30 > 22/1.36  -Renally dose medications      #Hypothyroidism  -Continue levothyroxine     #Iron deficiency amenia  -Stable  -H/H 10.1/31.7  -Continue cyanocobalamin, ergocalciferol, ferrous sulfate      DVT ppx  -lovenox     F: PRN  E: Replete per protocol  N: Cardiac  A: PIV     Disposition: Pt requires more than 2 inpatient days at this time. DC to SNF tomorrow.   Code Status: Full Code     Total accumulated time spent face to face and not face to face preparing to see the patient, obtaining and reviewing separately  obtained history; performing a medically appropriate examination and/or evaluation; counseling and educating the patient, family; ordering medications, tests, or procedures; referring and communicating with other health care professionals; documenting clinical information in the patient's medical record; independently interpreting results and communicating the results to the patient, family; and care coordination was 30 minutes.           Gladys Mendez APRN-CNP

## 2024-02-14 NOTE — PROGRESS NOTES
Plan to continue to manage BP meds. Plan for discharge tomorrow to [facility]. Updates sent to SNF.     Discharge plan: [facility].  ND Secure  MD needs to sign goldenrod prior to discharge.

## 2024-02-14 NOTE — PROGRESS NOTES
Physical Therapy    Physical Therapy Treatment    Patient Name: Stella WILKES Saturday  MRN: [id number]  Today's Date: 2/14/2024  Time Calculation  Start Time: 0920  Stop Time: 0946  Time Calculation (min): 26 min       Assessment/Plan   PT Assessment  End of Session Communication: Bedside nurse  Assessment Comment: Pt agreeable to tx, pt stated that she used to walk a lot at home, but has beend having falls.  End of Session Patient Position: Up in chair, Alarm off, not on at start of session     PT Plan  Treatment/Interventions: Bed mobility, Transfer training, Gait training, Stair training, Balance training, Strengthening, Endurance training, Therapeutic exercise, Therapeutic activity, Home exercise program  PT Plan: Skilled PT  PT Frequency: 3 times per week  PT Discharge Recommendations: Moderate intensity level of continued care  PT Recommended Transfer Status: Assist x1  PT - OK to Discharge: Yes      General Visit Information:   PT  Visit  PT Received On: 02/14/24  General  Prior to Session Communication: Bedside nurse  Patient Position Received: Up in chair, Alarm off, not on at start of session  Preferred Learning Style: verbal    Subjective   Precautions:     Vital Signs:       Objective   Pain:     Cognition:     Postural Control:  Static Standing Balance  Static Standing-Balance Support: Bilateral upper extremity supported  Static Standing-Level of Assistance: Close supervision  Static Standing-Comment/Number of Minutes: 2+ min  Extremity/Trunk Assessments:    Activity Tolerance:     Treatments:  Therapeutic Exercise  Therapeutic Exercise Performed: Yes  Therapeutic Exercise Activity 1: seated toe/heel raises 20x  Therapeutic Exercise Activity 2: LAQ 20x  Therapeutic Exercise Activity 3: seated marches 20x                   Ambulation/Gait Training 1  Surface 1: Level tile, Carpet  Device 1: Rolling walker  Gait Support Devices: Gait belt  Assistance 1: Contact guard  Quality of Gait 1: Decreased step length,  Diminished heel strike  Comments/Distance (ft) 1: 100+ ft x 2, pt hesitant when navigating objects in camacho, possibly d/t vision defecits.  Transfers  Transfer: Yes  Transfer 1  Transfer From 1: Chair with arms to  Transfer to 1: Stand  Technique 1: Sit to stand, Stand to sit  Transfer Device 1: Walker  Transfer Level of Assistance 1: Contact guard, Moderate verbal cues  Trials/Comments 1: x2, less VC 2nd att    Outcome Measures:  [facility] Basic Mobility  Turning from your back to your side while in a flat bed without using bedrails: A little  Moving from lying on your back to sitting on the side of a flat bed without using bedrails: A little  Moving to and from bed to chair (including a wheelchair): A little  Standing up from a chair using your arms (e.g. wheelchair or bedside chair): A little  To walk in hospital room: A lot  Climbing 3-5 steps with railing: A lot  Basic Mobility - Total Score: 16    Education Documentation  Home Exercise Program, taught by Shelly Vigil PTA at 2/14/2024 10:08 AM.  Learner: Patient  Readiness: Acceptance  Method: Explanation  Response: Verbalizes Understanding    Education Comments  No comments found.        OP EDUCATION:       Encounter Problems       Encounter Problems (Active)       Balance       STG - Maintains dynamic standing balance with upper extremity support >= 5 min with >=good- balance  (Progressing)       Start:  02/13/24    Expected End:  02/27/24               Mobility       LTG - Patient will ambulate community distance with SUP with WW (Progressing)       Start:  02/13/24    Expected End:  02/27/24            STG - Patient will ascend and descend a flight of stairs with SBA with 1 rail and cane        Start:  02/13/24    Expected End:  02/27/24               Mobility       LTG - Patient will ambulate household distance with SUP with cane  (Progressing)       Start:  02/13/24    Expected End:  02/27/24               Pain - Adult          Transfers       STG - Patient  will perform bed mobility IND        Start:  02/13/24    Expected End:  02/27/24            STG - Patient will transfer sit to and from stand with SUP and WW  (Progressing)       Start:  02/13/24    Expected End:  02/27/24

## 2024-02-15 VITALS
TEMPERATURE: 97.7 F | RESPIRATION RATE: 18 BRPM | DIASTOLIC BLOOD PRESSURE: 63 MMHG | SYSTOLIC BLOOD PRESSURE: 175 MMHG | WEIGHT: 126.1 LBS | HEIGHT: 63 IN | OXYGEN SATURATION: 100 % | BODY MASS INDEX: 22.34 KG/M2 | HEART RATE: 69 BPM

## 2024-02-15 LAB
ANION GAP SERPL CALC-SCNC: 16 MMOL/L (ref 10–20)
BNP SERPL-MCNC: 55 PG/ML (ref 0–99)
BUN SERPL-MCNC: 24 MG/DL (ref 6–23)
CALCIUM SERPL-MCNC: 8.9 MG/DL (ref 8.6–10.3)
CHLORIDE SERPL-SCNC: 110 MMOL/L (ref 98–107)
CO2 SERPL-SCNC: 16 MMOL/L (ref 21–32)
CREAT SERPL-MCNC: 1.39 MG/DL (ref 0.5–1.05)
EGFRCR SERPLBLD CKD-EPI 2021: 37 ML/MIN/1.73M*2
ERYTHROCYTE [DISTWIDTH] IN BLOOD BY AUTOMATED COUNT: 13.5 % (ref 11.5–14.5)
FLUAV RNA RESP QL NAA+PROBE: NOT DETECTED
FLUBV RNA RESP QL NAA+PROBE: NOT DETECTED
GLUCOSE BLD MANUAL STRIP-MCNC: 112 MG/DL (ref 74–99)
GLUCOSE BLD MANUAL STRIP-MCNC: 121 MG/DL (ref 74–99)
GLUCOSE BLD MANUAL STRIP-MCNC: 130 MG/DL (ref 74–99)
GLUCOSE BLD MANUAL STRIP-MCNC: 180 MG/DL (ref 74–99)
GLUCOSE SERPL-MCNC: 132 MG/DL (ref 74–99)
HCT VFR BLD AUTO: 32.1 % (ref 36–46)
HGB BLD-MCNC: 10 G/DL (ref 12–16)
MAGNESIUM SERPL-MCNC: 2.09 MG/DL (ref 1.6–2.4)
MCH RBC QN AUTO: 29.2 PG (ref 26–34)
MCHC RBC AUTO-ENTMCNC: 31.2 G/DL (ref 32–36)
MCV RBC AUTO: 94 FL (ref 80–100)
NRBC BLD-RTO: 0 /100 WBCS (ref 0–0)
PLATELET # BLD AUTO: 209 X10*3/UL (ref 150–450)
POTASSIUM SERPL-SCNC: 5 MMOL/L (ref 3.5–5.3)
RBC # BLD AUTO: 3.42 X10*6/UL (ref 4–5.2)
RSV RNA RESP QL NAA+PROBE: NOT DETECTED
SARS-COV-2 RNA RESP QL NAA+PROBE: DETECTED
SODIUM SERPL-SCNC: 137 MMOL/L (ref 136–145)
WBC # BLD AUTO: 8.1 X10*3/UL (ref 4.4–11.3)

## 2024-02-15 PROCEDURE — 2500000002 HC RX 250 W HCPCS SELF ADMINISTERED DRUGS (ALT 637 FOR MEDICARE OP, ALT 636 FOR OP/ED): Mod: IPSPLIT

## 2024-02-15 PROCEDURE — 82947 ASSAY GLUCOSE BLOOD QUANT: CPT | Mod: IPSPLIT

## 2024-02-15 PROCEDURE — 2500000001 HC RX 250 WO HCPCS SELF ADMINISTERED DRUGS (ALT 637 FOR MEDICARE OP): Mod: IPSPLIT | Performed by: INTERNAL MEDICINE

## 2024-02-15 PROCEDURE — 87637 SARSCOV2&INF A&B&RSV AMP PRB: CPT | Mod: IPSPLIT

## 2024-02-15 PROCEDURE — 2500000004 HC RX 250 GENERAL PHARMACY W/ HCPCS (ALT 636 FOR OP/ED): Mod: IPSPLIT

## 2024-02-15 PROCEDURE — 83880 ASSAY OF NATRIURETIC PEPTIDE: CPT | Mod: IPSPLIT

## 2024-02-15 PROCEDURE — 36415 COLL VENOUS BLD VENIPUNCTURE: CPT | Mod: IPSPLIT

## 2024-02-15 PROCEDURE — 83735 ASSAY OF MAGNESIUM: CPT | Mod: IPSPLIT

## 2024-02-15 PROCEDURE — 85027 COMPLETE CBC AUTOMATED: CPT | Mod: IPSPLIT | Performed by: INTERNAL MEDICINE

## 2024-02-15 PROCEDURE — 80048 BASIC METABOLIC PNL TOTAL CA: CPT | Mod: IPSPLIT

## 2024-02-15 PROCEDURE — 2500000001 HC RX 250 WO HCPCS SELF ADMINISTERED DRUGS (ALT 637 FOR MEDICARE OP): Mod: IPSPLIT

## 2024-02-15 RX ORDER — GUAIFENESIN 600 MG/1
600 TABLET, EXTENDED RELEASE ORAL 2 TIMES DAILY PRN
Status: DISCONTINUED | OUTPATIENT
Start: 2024-02-15 | End: 2024-02-16 | Stop reason: HOSPADM

## 2024-02-15 RX ORDER — LATANOPROST 50 UG/ML
1 SOLUTION/ DROPS OPHTHALMIC DAILY
Status: DISCONTINUED | OUTPATIENT
Start: 2024-02-15 | End: 2024-02-16 | Stop reason: HOSPADM

## 2024-02-15 RX ORDER — FLUTICASONE PROPIONATE 50 MCG
2 SPRAY, SUSPENSION (ML) NASAL DAILY
Status: DISCONTINUED | OUTPATIENT
Start: 2024-02-15 | End: 2024-02-16 | Stop reason: HOSPADM

## 2024-02-15 RX ORDER — TRIAMCINOLONE ACETONIDE 1 MG/G
1 CREAM TOPICAL 2 TIMES DAILY
COMMUNITY

## 2024-02-15 RX ORDER — GUAIFENESIN 600 MG/1
600 TABLET, EXTENDED RELEASE ORAL 2 TIMES DAILY PRN
Start: 2024-02-15 | End: 2024-03-16

## 2024-02-15 RX ORDER — LATANOPROST 50 UG/ML
1 SOLUTION/ DROPS OPHTHALMIC DAILY
COMMUNITY

## 2024-02-15 RX ORDER — TRIAMCINOLONE ACETONIDE 1 MG/G
OINTMENT TOPICAL 2 TIMES DAILY
Status: DISCONTINUED | OUTPATIENT
Start: 2024-02-15 | End: 2024-02-16 | Stop reason: HOSPADM

## 2024-02-15 RX ORDER — METOPROLOL TARTRATE 25 MG/1
12.5 TABLET, FILM COATED ORAL EVERY 12 HOURS SCHEDULED
Refills: 0
Start: 2024-02-15

## 2024-02-15 RX ORDER — INSULIN LISPRO 100 [IU]/ML
0-15 INJECTION, SOLUTION INTRAVENOUS; SUBCUTANEOUS
Qty: 13.5 ML | Refills: 0
Start: 2024-02-15 | End: 2024-04-22 | Stop reason: ALTCHOICE

## 2024-02-15 RX ORDER — ISOSORBIDE MONONITRATE 30 MG/1
30 TABLET, EXTENDED RELEASE ORAL DAILY
Qty: 30 TABLET | Refills: 0
Start: 2024-02-16 | End: 2024-04-22 | Stop reason: SDUPTHER

## 2024-02-15 RX ORDER — FLUTICASONE PROPIONATE 50 MCG
2 SPRAY, SUSPENSION (ML) NASAL DAILY
Qty: 16 G | Refills: 12 | Status: SHIPPED
Start: 2024-02-16

## 2024-02-15 RX ORDER — TRIAMCINOLONE ACETONIDE 1 MG/G
1 CREAM TOPICAL 2 TIMES DAILY
Status: DISCONTINUED | OUTPATIENT
Start: 2024-02-15 | End: 2024-02-15

## 2024-02-15 RX ADMIN — AMLODIPINE BESYLATE 2.5 MG: 2.5 TABLET ORAL at 09:20

## 2024-02-15 RX ADMIN — TRIAMCINOLONE ACETONIDE: 1 OINTMENT TOPICAL at 12:22

## 2024-02-15 RX ADMIN — LATANOPROST 1 DROP: 50 SOLUTION OPHTHALMIC at 21:06

## 2024-02-15 RX ADMIN — DOCUSATE SODIUM 100 MG: 100 CAPSULE, LIQUID FILLED ORAL at 20:34

## 2024-02-15 RX ADMIN — ISOSORBIDE MONONITRATE 30 MG: 30 TABLET, EXTENDED RELEASE ORAL at 09:18

## 2024-02-15 RX ADMIN — DOCUSATE SODIUM 100 MG: 100 CAPSULE, LIQUID FILLED ORAL at 09:20

## 2024-02-15 RX ADMIN — TRIAMCINOLONE ACETONIDE: 1 OINTMENT TOPICAL at 21:06

## 2024-02-15 RX ADMIN — ATORVASTATIN CALCIUM 80 MG: 40 TABLET, FILM COATED ORAL at 20:34

## 2024-02-15 RX ADMIN — INSULIN GLARGINE 11 UNITS: 100 INJECTION, SOLUTION SUBCUTANEOUS at 09:18

## 2024-02-15 RX ADMIN — ENOXAPARIN SODIUM 30 MG: 30 INJECTION SUBCUTANEOUS at 20:34

## 2024-02-15 RX ADMIN — INSULIN LISPRO 3 UNITS: 100 INJECTION, SOLUTION INTRAVENOUS; SUBCUTANEOUS at 12:23

## 2024-02-15 RX ADMIN — CLOPIDOGREL BISULFATE 75 MG: 75 TABLET ORAL at 09:20

## 2024-02-15 RX ADMIN — FLUTICASONE PROPIONATE 2 SPRAY: 50 SPRAY, METERED NASAL at 11:17

## 2024-02-15 RX ADMIN — CYANOCOBALAMIN TAB 1000 MCG 1000 MCG: 1000 TAB at 09:20

## 2024-02-15 RX ADMIN — METOPROLOL TARTRATE 12.5 MG: 25 TABLET, FILM COATED ORAL at 09:13

## 2024-02-15 RX ADMIN — FERROUS SULFATE TAB 325 MG (65 MG ELEMENTAL FE) 1 TABLET: 325 (65 FE) TAB at 09:20

## 2024-02-15 RX ADMIN — LEVOTHYROXINE SODIUM 150 MCG: 75 TABLET ORAL at 09:18

## 2024-02-15 RX ADMIN — METOPROLOL TARTRATE 12.5 MG: 25 TABLET, FILM COATED ORAL at 20:33

## 2024-02-15 RX ADMIN — TIZANIDINE 2 MG: 4 TABLET ORAL at 09:14

## 2024-02-15 ASSESSMENT — COGNITIVE AND FUNCTIONAL STATUS - GENERAL
EATING MEALS: A LITTLE
DAILY ACTIVITIY SCORE: 18
DRESSING REGULAR LOWER BODY CLOTHING: A LITTLE
PERSONAL GROOMING: A LITTLE
TOILETING: A LITTLE
TURNING FROM BACK TO SIDE WHILE IN FLAT BAD: A LITTLE
DRESSING REGULAR LOWER BODY CLOTHING: A LITTLE
DRESSING REGULAR UPPER BODY CLOTHING: A LITTLE
DRESSING REGULAR UPPER BODY CLOTHING: A LITTLE
MOVING FROM LYING ON BACK TO SITTING ON SIDE OF FLAT BED WITH BEDRAILS: A LITTLE
MOBILITY SCORE: 16
WALKING IN HOSPITAL ROOM: A LOT
MOVING TO AND FROM BED TO CHAIR: A LITTLE
PERSONAL GROOMING: A LITTLE
HELP NEEDED FOR BATHING: A LITTLE
HELP NEEDED FOR BATHING: A LITTLE
CLIMB 3 TO 5 STEPS WITH RAILING: A LOT
TOILETING: A LITTLE
EATING MEALS: A LITTLE
DAILY ACTIVITIY SCORE: 18
STANDING UP FROM CHAIR USING ARMS: A LITTLE

## 2024-02-15 ASSESSMENT — PAIN SCALES - GENERAL
PAINLEVEL_OUTOF10: 0 - NO PAIN
PAINLEVEL_OUTOF10: 0 - NO PAIN

## 2024-02-15 ASSESSMENT — PAIN - FUNCTIONAL ASSESSMENT
PAIN_FUNCTIONAL_ASSESSMENT: 0-10
PAIN_FUNCTIONAL_ASSESSMENT: 0-10

## 2024-02-15 NOTE — CONSULTS
"Nutrition Assessment Note  Nutrition Assessment      Reason for Assessment  Reason for Assessment: Provider consult order (Verbal consult from nursing for possible need for oral supplement, concern for poor diet quality at home.  Noted MST = 0.)    Per H&P / Progress Notes:  Pt presented to ED from PCP's office due to intermittent L sided chest pain radiating to neck and jaw, worse with exertion and breathing.  Pt also with elevated blood pressure at time of admission.  Chest xray showed \"Low lung volumes with newly seen platelike atelectasis.\"  Cardiology consulted.  Repeat echo showed \"Left ventricular systolic function is normal with a 65-70% estimated ejection fraction.\"  Current diet order is Cardiac.    Per discussion at IDT Rounds, pt with multiple falls per day, planned for discharge to SNF when medically ready.     Past Medical History:   Diagnosis Date    BMI 21.0-21.9, adult      Chronic kidney disease, stage 3b (CMS/[facility]) 08/22/2022     Stage 3b chronic kidney disease    Other specified diabetes mellitus with hyperosmolarity without nonketotic hyperglycemic-hyperosmolar coma ([facility]) (CMS/[facility])       Diabetes mellitus of other type with hyperosmolarity, with long-term current use of insulin    Personal history of other diseases of the digestive system       History of diverticulitis    Personal history of other diseases of the musculoskeletal system and connective tissue       History of gout    Personal history of other endocrine, nutritional and metabolic disease       History of hypercholesterolemia    Personal history of other endocrine, nutritional and metabolic disease 02/28/2019     History of type 2 diabetes mellitus    Personal history of other mental and behavioral disorders 08/09/2018     History of depression    Personal history of other specified conditions 09/22/2022     History of chronic fatigue    Soft tissue disorder, unspecified 10/02/2018     Soft tissue lesion of elbow region     Past " "Surgical History:  EYE SURGERY 08/28/2017 Eye Surgery  FOOT SURGERY 08/28/2017 Foot Surgery  GALLBLADDER SURGERY 08/28/2017 Gallbladder Surgery  TONSILLECTOMY 10/11/2017 Tonsillectomy  TOTAL THYROIDECTOMY 10/11/2017  Thyroid Surgery Total Thyroidectomy    Medications and allergies reviewed.    Pertinent Labs:  2/14/24:  POCT Glu 146 to 152  H/H 10.4/32.1 (L)  Glu 160 (H)  Cl 108 (H)  Bicarb 20 (L)  Cr 1.36 (H)  GFR 38 (H)    Last Recorded Vitals  /61 (BP Location: Right arm, Patient Position: Lying)   Pulse 66   Temp 36.9 °C (98.4 °F) (Temporal)   Resp 18   Wt 57.2 kg (126 lb 1.7 oz)   SpO2 98%     History:  Food and Nutrient History  Energy Intake: Fair 50-75 %  Food and Nutrient History: Attempted to visit Stella in room, she is laying in bed and appears to be sleeping.  Nursing student present at time of visit.  Unable to obtain subjective data from patient.  Nutrition assessment completed through chart review and discussion with nursing.  Per nursing student, pt ate 100% breakfast today.  +BM x 3, medium, brown, loose on 2/13 per nursing flowsheet.  Vitamin/Herbal Supplement Use: unable to assess.         Food and Nutrient Administration History  Additional Food and Nutrient Administration History: PO Intake per flowsheet / Health Touch:  2/13- 100% B / 75% L / 100% D; 2/14- 100% B (eggs, blueberry muffin, tea) / 100% L (tomato soup, grilled cheese, pudding) / 50% D (salmon, mashed potatoes, gravy, carrots, ice cream)                   Anthropometrics:  Height: 160 cm (5' 2.99\")  Weight: 57.2 kg (126 lb 1.7 oz)  BMI (Calculated): 22.34    Weight Change: -0.71    Weight Change  Weight History / % Weight Change: Weight history per chart:  2/14/24- 57.2 kg; 10/11/23- 58.1 kg; 8/17/23- 62.1 kg (7.9% loss x 6 months); 2/24/23- 62.7 kg (8.8% loss x 1 year)  Significant Weight Loss: No         IBW/kg (Dietitian Calculated): 59.1 kg  Percent of IBW: 95.8 %     Wt Readings from Last 15 Encounters:   02/14/24 " "57.2 kg (126 lb 1.7 oz)   02/12/24 60.8 kg (134 lb 1.6 oz)   10/11/23 58.1 kg (128 lb)   10/09/23 57.2 kg (126 lb)   09/21/23 61.2 kg (135 lb)   09/05/23 61.1 kg (134 lb 12.8 oz)   08/25/23 61.2 kg (135 lb)   08/17/23 62.1 kg (137 lb)   06/02/23 59.6 kg (131 lb 8 oz)   05/16/23 62.3 kg (137 lb 5 oz)   04/26/23 61.3 kg (135 lb 3.2 oz)   03/06/23 62.7 kg (138 lb 2 oz)   02/24/23 62.7 kg (138 lb 2 oz)   01/11/23 64.9 kg (143 lb)   12/28/22 63.6 kg (140 lb 2 oz)                               Energy Needs:  Calculated Energy Needs Using Equations  Height: 160 cm (5' 2.99\")  Weight Used for Equation Calculations: 57.2 kg (126 lb 1.7 oz)  [place]- St. Jeor Equation (Overweight or Obese Patients): 981  Temp: 37.2 °C (99 °F)    Estimated Energy Needs  Total Energy Estimated Needs (kCal): 1715 kCal  Total Estimated Energy Need per Day (kCal/kg): 30 kCal/kg  Method for Estimating Needs: 30 kcal/kg actual weight    Estimated Protein Needs  Total Protein Estimated Needs (g): 69 g  Total Protein Estimated Needs (g/kg): 1.2 g/kg  Method for Estimating Needs: 1.2 g/kg actual weight.    Estimated Fluid Needs  Total Fluid Estimated Needs (mL): 1715 mL  Method for Estimating Needs: 1 mL/kcal or fluid per medical team.         Nutrition Focused Physical Findings:  Subcutaneous Fat Loss  Orbital Fat Pads: Mild-Moderate (slight dark circles and slight hollowing) (Visual assessment only; pt unable to participate in NFPE.)  Buccal Fat Pads: Mild-Moderate (flat cheeks, minimal bounce)  Triceps: Defer  Ribs: Defer    Muscle Wasting  Temporalis: Mild-Moderate (slight depression) (Visual assessment only; pt unable to participate in NFPE.)  Pectoralis (Clavicular Region): Defer  Deltoid/Trapezius: Defer  Interosseous: Defer  Trapezius/Infraspinatus/Supraspinatus (Scapular Region): Defer  Quadriceps: Defer  Gastrocnemius: Defer    Edema  Edema: none  Edema Location: per nursing flowsheet.         Physical Findings (Nutrition " Deficiency/Toxicity)  Skin: Positive (R forehead abrasion per nursing flowsheet.)       Nutrition Diagnosis   Malnutrition Diagnosis  Patient has Malnutrition Diagnosis:  (Unable to determine at this time.)    Patient has Nutrition Diagnosis: Yes  Nutrition Diagnosis 1: Inadequate protein energy intake  Diagnosis Status (1): New  Related to (1): decreased appetite and increased metabolic demand  As Evidenced by (1): pt with fair nutriitonal intake meeting less than 75% of estimated needs.                                            Nutrition Interventions/Recommendations   Nutrition Prescription  Individualized Nutrition Prescription Provided for : diet, fluids, oral supplements    Food and/or Nutrient Delivery Interventions  Meals and Snacks: Energy-modified diet, Protein-modified diet  Goal: Continue Cardiac Diet; Will hold on CCD diet at this time and monitor need for further diet liberalization; Encourage intake of protein foods at each meal.                                    Medical Food Supplement: Commercial beverage  Goal: Glucerna one time per day (220 kcal/10 g protein)                             Additional Interventions: Check weight 2 to 3 times per week.         Coordination of Nutrition Care by a Nutrition Professional  Collaboration and Referral of Nutrition Care: Collaboration by nutrition professional with other providers  Goal: Pt reviewed at IDT Rounds; ELINOR Munoz; ELINOR Lan student    Education Documentation  No documentation found.    -not indicated, pt sleeping at time of visit.       Nutrition Monitoring and Evaluation   Food and Nutrient Related History  Energy Intake: Estimated energy intake  Criteria: Nutritional intake meeting > 75% of estimated needs.    Fluid Intake: Estimated fluid intake  Criteria: Fluid intake meeting estimated needs.    Amount of Food: Medical food intake  Criteria: Pt will consume Glucerna one time per day with good tolerance.                              Anthropometrics: Body Composition/Growth/Weight History  Weight: Measured weight  Criteria: Gradual non-fluid weight gain to acceptable BMI 23 to < 25 kg/m2.                   Biochemical Data, Medical Tests and Procedures  Electrolyte and Renal Panel: BUN, Creatinine, Chloride  Criteria: Renal labs / lytes wnl.                        Nutrition Focused Physical Findings  Adipose: Loss of subcutaneous fat  Criteria: Gain / maintain adipose stores.              Muscles: Muscle atrophy  Criteria: Gain / maintain lean muscle mass.    Skin: Impaired wound healing  Criteria: wound healing / skin wnl              Follow Up  Time Spent (min): 50 minutes  Last Date of Nutrition Visit: 02/14/24  Nutrition Follow-Up Needed?: 3-5 days  Follow up Comment: % meals; ONS; needs NFPE

## 2024-02-15 NOTE — CARE PLAN
The patient's goals for the shift include      The clinical goals for the shift include Ambulate without getting short of breath    Over the shift, the patient did  make progress toward the following goals.

## 2024-02-15 NOTE — PROGRESS NOTES
Patient is medically ready for discharge. Patient follow up information is on discharge instructions. Patient will be going to [facility] for rehab.  Patient is in agreement with discharge plan. Patient is COVID + today, facility is still able to accept today. [facility] will send final orders, complete 7000, and set up transportation.     Discharge plan: [facility].  [place] Secure

## 2024-02-15 NOTE — CARE PLAN
The patient's goals for the shift include      The clinical goals for the shift include plan to discharge to SNF today        Problem: Discharge Barriers  Goal: My discharge needs are met  Outcome: Progressing  Flowsheets (Taken 2/13/2024 8891)  Resident's discharge needs are met: Identify potential discharge barriers on admission and throughout stay     Awaiting ride for discharge to SNF, ETA [id number]

## 2024-02-16 RX ORDER — ERGOCALCIFEROL 1.25 MG/1
1 CAPSULE ORAL
Qty: 12 CAPSULE | Refills: 0 | OUTPATIENT
Start: 2024-02-16

## 2024-02-16 RX ORDER — FERROUS SULFATE TAB 325 MG (65 MG ELEMENTAL FE) 325 (65 FE) MG
1 TAB ORAL DAILY
Qty: 90 TABLET | Refills: 0 | Status: SHIPPED | OUTPATIENT
Start: 2024-02-16

## 2024-02-16 NOTE — DISCHARGE SUMMARY
Discharge Diagnosis  Stable angina    Issues Requiring Follow-Up  Follow up with PCP and Cardiology    Discharge Meds     Your medication list        START taking these medications        Instructions Last Dose Given Next Dose Due   fluticasone 50 mcg/actuation nasal spray  Commonly known as: Flonase  Start taking on: February 16, 2024      Administer 2 sprays into each nostril once daily. Shake gently. Before first use, prime pump. After use, clean tip and replace cap. Do not start before February 16, 2024.       insulin lispro 100 unit/mL injection  Commonly known as: HumaLOG      Inject 0-0.15 mL (0-15 Units) under the skin 3 times a day with meals. Take as directed per insulin instructions.       isosorbide mononitrate ER 30 mg 24 hr tablet  Commonly known as: Imdur  Start taking on: February 16, 2024      Take 1 tablet (30 mg) by mouth once daily. Do not crush or chew. Do not start before February 16, 2024.       guaiFENesin 600 mg 12 hr tablet  Commonly known as: Mucinex      Take 1 tablet (600 mg) by mouth 2 times a day as needed for cough. Do not crush, chew, or split.              CHANGE how you take these medications        Instructions Last Dose Given Next Dose Due   metoprolol tartrate 25 mg tablet  Commonly known as: Lopressor  What changed:   how much to take  when to take this      Take 0.5 tablets (12.5 mg) by mouth every 12 hours.              CONTINUE taking these medications        Instructions Last Dose Given Next Dose Due   acetaminophen 325 mg tablet  Commonly known as: Tylenol           amLODIPine 2.5 mg tablet  Commonly known as: Norvasc           atorvastatin 80 mg tablet  Commonly known as: Lipitor      Take 1 tablet (80 mg) by mouth once daily at bedtime.       Autolet lancing device  Commonly known as: Accu-Chek FastClix Lancing Dev      Use as instructed       Blood glucose monitoring meter kit kit  Commonly known as: True Metrix Glucose Meter      Use as directed       clopidogrel 75 mg  "tablet  Commonly known as: Plavix      Take 1 tablet (75 mg) by mouth once daily.       cyanocobalamin 1,000 mcg tablet  Commonly known as: Vitamin B-12      Take 1 tablet (1,000 mcg) by mouth once daily.       docusate sodium 100 mg capsule  Commonly known as: Colace           ergocalciferol 1.25 MG (19720 UT) capsule  Commonly known as: Vitamin D-2      Take 1 capsule (1,250 mcg) by mouth 1 (one) time per week.       ferrous sulfate (325 mg ferrous sulfate) tablet  Commonly known as: FeroSuL      Take 1 tablet by mouth once daily. as directed       Fish OiL 1,000 mg (120 mg-180 mg) capsule  Generic drug: omega 3-dha-epa-fish oil           glucose 4 gram chewable tablet           insulin degludec 100 unit/mL (3 mL) injection  Commonly known as: Tresiba FlexTouch U-100      Inject 14 Units under the skin once daily in the morning.       latanoprost 0.005 % ophthalmic solution  Commonly known as: Xalatan           levothyroxine 150 mcg tablet  Commonly known as: Synthroid, Levoxyl      Take 1 tablet (150 mcg) by mouth once daily.       Lidoderm 5 % patch  Generic drug: lidocaine           pen needle, diabetic 31 gauge x 5/16\" needle           potassium chloride CR 20 mEq ER tablet  Commonly known as: Klor-Con M20      Take 1 tablet (20 mEq) by mouth once daily. Do not crush, chew, or split.       tiZANidine 2 mg tablet  Commonly known as: Zanaflex      Take 1 tablet (2 mg) by mouth once daily.       triamcinolone 0.1 % cream  Commonly known as: Kenalog           True Metrix Glucose Test Strip strip  Generic drug: blood sugar diagnostic      1 strip 3 times a day.              STOP taking these medications      losartan 25 mg tablet  Commonly known as: Cozaar                  Where to Get Your Medications        Information about where to get these medications is not yet available    Ask your nurse or doctor about these medications  fluticasone 50 mcg/actuation nasal spray  guaiFENesin 600 mg 12 hr tablet  insulin " lispro 100 unit/mL injection  isosorbide mononitrate ER 30 mg 24 hr tablet  metoprolol tartrate 25 mg tablet         Test Results Pending At Discharge  Pending Labs       No current pending labs.            Hospital Course   #Chest pain 2/2 stable angina   #Hypertension  #HLD  #Chronic diastolic heart failure  -CXR: 1. Low lung volumes with newly seen platelike atelectasis.   -Echo (3/23): 1. Left ventricular systolic function is normal with a 65-70% estimated ejection fraction.  2. Spectral Doppler shows an impaired relaxation pattern of left ventricular diastolic filling.  3. There is moderate to severe mitral annular calcification.  4. Mild aortic valve stenosis.  5. There is moderate aortic valve cusp calcification.  -CT PE: 1. No findings of acute pulmonary embolism. Moderate aortic arch  calcification. Bronchial thickening. No localizing infiltrate.  Senescent changes.  -Repeat echo: 1. Left ventricular systolic function is normal with a 65-70% estimated ejection fraction.   2. Spectral Doppler shows an impaired relaxation pattern of left ventricular diastolic filling.   3. There is moderate concentric left ventricular hypertrophy.   4. Mild aortic valve stenosis.   5. There is moderate aortic valve cusp calcification.  -D-dimer 822  -Trop 9 > 9 > 9  -  -CRP 0.14  -Lipid panel: Mariella 99, HDL 22.1, LDL 40, Trig 184  -Cardiac monitoring  -Cardiology consult, appreciate recs  -Continue amlodipine, atorvastatin, clopidogrel, losartan, metoprolol     #DM Type II  -Hgb A1C: 6.1  -SSI with hypoglycemia protocol  -Monitor BG      #Hx of CVA  -Continue atorvastatin, clopidogrel      #CKD  -Baseline creat ~ 1.2  -Bun/creat 23/1.30 > 22/1.36 > 24/1.39  -Renally dose medications      #Hypothyroidism  -Continue levothyroxine     #Iron deficiency amenia  -Stable  -H/H 10.1/31.7 > 10/32.1  -Continue cyanocobalamin, ergocalciferol, ferrous sulfate     #Covid  -Covid positive  -RA  -FLONASE and Mucinex added at  discharge for sinus complaints  -Facility notified, agrees to continue care of patient despite Covid positive today.     DVT ppx  -lovenox     F: PRN  E: Replete per protocol  N: Cardiac  A: PIV      Disposition: Patient was stable to be discharged to SNF.  [facility] rehab to continue monitoring of Covid symptoms.  Patient to follow up with cardiology and PCP.       Total cumulative time spent in preparation of this discharge including documentation review, coordination of care with the medical team including PT/SW/care coordinators and treating consultants, discussion with patient and pertinent family members and finalization of prescriptions, follow-up appointments, and this discharge summary was approximately 45 minutes.       Pertinent Physical Exam At Time of Discharge  Physical Exam  HENT:      Head: Normocephalic and atraumatic.      Nose: Rhinorrhea present.      Mouth/Throat:      Mouth: Mucous membranes are moist.   Eyes:      Extraocular Movements: Extraocular movements intact.   Cardiovascular:      Rate and Rhythm: Normal rate and regular rhythm.      Pulses: Normal pulses.      Heart sounds: Normal heart sounds.   Pulmonary:      Effort: Pulmonary effort is normal.      Breath sounds: Normal breath sounds.   Abdominal:      General: Bowel sounds are normal.      Palpations: Abdomen is soft.   Musculoskeletal:         General: Normal range of motion.      Cervical back: Normal range of motion.   Skin:     General: Skin is warm and dry.      Capillary Refill: Capillary refill takes less than 2 seconds.   Neurological:      Mental Status: She is alert. Mental status is at baseline.      Motor: Weakness present.      Gait: Gait abnormal.   Psychiatric:         Mood and Affect: Mood normal.         Behavior: Behavior normal.         Outpatient Follow-Up  No future appointments.      Angeles Jean-Baptiste, SD-CNP

## 2024-02-16 NOTE — NURSING NOTE
Patient discharged at 1025 via [facility] and 2 [facility] personnel. Report given to transport and the middle desk nurse at [facility]. Made aware of COVID + status, DM and current admit for chest pain/ angina. IV was removed and her belongings were sent with her- a large purse and 3 green bags. She was wearing her glasses. Patient was oriented and able to tell us where she was and where she was going to. Cooperative with transport. Telemetry removed- NSR.  and she drank a full Glucerna, staff aware. No pain or SOB on departure, on room air.

## 2024-02-22 ENCOUNTER — NURSING HOME VISIT (OUTPATIENT)
Dept: POST ACUTE CARE | Facility: EXTERNAL LOCATION | Age: 89
End: 2024-02-22
Payer: MEDICARE

## 2024-02-22 DIAGNOSIS — E11.9 TYPE 2 DIABETES MELLITUS WITHOUT COMPLICATION, WITHOUT LONG-TERM CURRENT USE OF INSULIN (MULTI): ICD-10-CM

## 2024-02-22 DIAGNOSIS — D50.8 OTHER IRON DEFICIENCY ANEMIA: ICD-10-CM

## 2024-02-22 DIAGNOSIS — E78.2 MIXED HYPERLIPIDEMIA: ICD-10-CM

## 2024-02-22 DIAGNOSIS — N18.32 STAGE 3B CHRONIC KIDNEY DISEASE (MULTI): ICD-10-CM

## 2024-02-22 DIAGNOSIS — R53.81 PHYSICAL DECONDITIONING: ICD-10-CM

## 2024-02-22 DIAGNOSIS — E87.6 HYPOKALEMIA: ICD-10-CM

## 2024-02-22 DIAGNOSIS — E55.9 VITAMIN D DEFICIENCY: ICD-10-CM

## 2024-02-22 DIAGNOSIS — E03.9 HYPOTHYROIDISM, UNSPECIFIED TYPE: ICD-10-CM

## 2024-02-22 DIAGNOSIS — I25.118 CORONARY ARTERY DISEASE OF NATIVE ARTERY OF NATIVE HEART WITH STABLE ANGINA PECTORIS (CMS-HCC): ICD-10-CM

## 2024-02-22 DIAGNOSIS — I10 BENIGN ESSENTIAL HTN: Primary | ICD-10-CM

## 2024-02-22 DIAGNOSIS — I63.9 CEREBROVASCULAR ACCIDENT (CVA), UNSPECIFIED MECHANISM (MULTI): ICD-10-CM

## 2024-02-22 PROCEDURE — 99309 SBSQ NF CARE MODERATE MDM 30: CPT | Performed by: NURSE PRACTITIONER

## 2024-02-22 NOTE — LETTER
Patient: Stella Saturday  : 1935    Encounter Date: 2024    Patient ID: Stella WILKES Saturday is a 88 y.o. female who is acute skilled care being seen and evaluated for multiple medical problems.  [id number]   1935    /59   Pulse 67   Temp 36.2 °C (97.2 °F)   Resp 16   Wt 56.9 kg (125 lb 6.4 oz)   SpO2 97%   BMI 22.22 kg/m²     Assessment/Plan  Problem List Items Addressed This Visit       Anemia     Ferrous sulfate 325 mg by mouth daily   B12 1000 mcg by mouth daily         Benign essential HTN - Primary     Metoprolol tart 12.5 mg by mouth BID  Amlodipine 2.5 mg by mouth daily          CVA (cerebral vascular accident) (CMS/[facility])     PMH  MRI brain- Acute left MCA territory infarcts  Plavix 75 mg by mouth daily          Hyperlipemia     Atorvastatin 80 mg by mouth daily          Hypothyroidism     Levothyroxine 150 mcg by mouth daily   TSH x 1          Stage 3b chronic kidney disease (CMS/[facility])     Avoid NSAIDS  2/15/2024 BUN/Creat 22/1.36  BMP and CBC with diff x 1         Hypokalemia     Potassium 20 meq by mouth daily   2/15/2024 K- 4.1         Physical deconditioning     PT/OT         Vitamin D deficiency     D2 50,000 units by mouth every Wednesday            Coronary artery disease of native artery of native heart with stable angina pectoris (CMS/[facility])     Isosorbide Mono 30 mg by mouth daily          Type 2 diabetes mellitus without complication, without long-term current use of insulin (CMS/[facility])     Degludec 14 units subcutaneous daily   Lispro SSC as prescribed AC  HGBA1C x 1              HPI: Client was admitted at [facility] from 2024- 2/15/2024 with the final diagnosis of Stable Angina. She was prescribed Isosorbide [place]. 2024 CT Chest (-) PE. PMH includes CVA, DM2, CKD, HTN, Hypothyroid, and Anemia. Client denies HA, dizziness, or lightheadedness. Denies CP, current SOB, or increased edema. She C/O nasal congestion and exertional dyspnea. Denies abdominal pain, N/V,  diarrhea, or constipation. Denies dysuria. Denies change in appetite. She has no current C/O pain.     Review of Systems [place]. ROS as described in HPI     Physical Exam  HENT:      Head: Normocephalic.      Mouth/Throat:      Mouth: Mucous membranes are moist.   Cardiovascular:      Rate and Rhythm: Normal rate and regular rhythm.   Pulmonary:      Effort: Pulmonary effort is normal.      Breath sounds: Normal breath sounds.   Abdominal:      General: Bowel sounds are normal.   Genitourinary:     Comments: Denies dysuria   Musculoskeletal:      Cervical back: Neck supple.      Comments: PT/OT  No leg edema    Skin:     General: Skin is warm and dry.   Neurological:      Mental Status: She is alert. Mental status is at baseline.   Psychiatric:         Mood and Affect: Mood normal.      Comments: Conversational                    Electronically Signed By: ANUSHKA Angelo   2/23/24  9:31 AM

## 2024-02-23 VITALS
BODY MASS INDEX: 22.22 KG/M2 | WEIGHT: 125.4 LBS | HEART RATE: 67 BPM | SYSTOLIC BLOOD PRESSURE: 108 MMHG | RESPIRATION RATE: 16 BRPM | OXYGEN SATURATION: 97 % | DIASTOLIC BLOOD PRESSURE: 59 MMHG | TEMPERATURE: 97.2 F

## 2024-02-23 PROBLEM — G47.00 INSOMNIA: Status: RESOLVED | Noted: 2023-08-19 | Resolved: 2024-02-23

## 2024-02-23 PROBLEM — F41.9 ANXIETY: Status: RESOLVED | Noted: 2020-09-21 | Resolved: 2024-02-23

## 2024-02-23 PROBLEM — R20.0 NUMBNESS: Status: RESOLVED | Noted: 2023-08-19 | Resolved: 2024-02-23

## 2024-02-23 PROBLEM — M79.603 PAIN IN UPPER LIMB: Status: RESOLVED | Noted: 2024-02-09 | Resolved: 2024-02-23

## 2024-02-23 PROBLEM — R06.09 DYSPNEA ON EXERTION: Status: RESOLVED | Noted: 2024-02-09 | Resolved: 2024-02-23

## 2024-02-23 PROBLEM — G89.29 CHRONIC PAIN OF BOTH KNEES: Status: RESOLVED | Noted: 2023-04-26 | Resolved: 2024-02-23

## 2024-02-23 PROBLEM — G89.29 CHRONIC PAIN OF RIGHT ANKLE: Status: RESOLVED | Noted: 2023-04-26 | Resolved: 2024-02-23

## 2024-02-23 PROBLEM — M25.571 CHRONIC PAIN OF RIGHT ANKLE: Status: RESOLVED | Noted: 2023-04-26 | Resolved: 2024-02-23

## 2024-02-23 PROBLEM — R07.9 ACUTE CHEST PAIN: Status: RESOLVED | Noted: 2024-02-12 | Resolved: 2024-02-23

## 2024-02-23 PROBLEM — L84 PRE-ULCERATIVE CORN OR CALLOUS: Status: RESOLVED | Noted: 2023-08-19 | Resolved: 2024-02-23

## 2024-02-23 PROBLEM — H53.9 VISION CHANGES: Status: RESOLVED | Noted: 2023-08-19 | Resolved: 2024-02-23

## 2024-02-23 PROBLEM — M15.9 OSTEOARTHRITIS OF MULTIPLE JOINTS: Status: RESOLVED | Noted: 2023-08-19 | Resolved: 2024-02-23

## 2024-02-23 PROBLEM — R07.89 CHEST DISCOMFORT: Status: RESOLVED | Noted: 2024-02-09 | Resolved: 2024-02-23

## 2024-02-23 PROBLEM — M25.561 CHRONIC PAIN OF BOTH KNEES: Status: RESOLVED | Noted: 2023-04-26 | Resolved: 2024-02-23

## 2024-02-23 PROBLEM — M53.3 SACRAL BACK PAIN: Status: RESOLVED | Noted: 2024-02-09 | Resolved: 2024-02-23

## 2024-02-23 PROBLEM — M25.562 CHRONIC PAIN OF BOTH KNEES: Status: RESOLVED | Noted: 2023-04-26 | Resolved: 2024-02-23

## 2024-02-23 PROBLEM — L30.9 ECZEMA: Status: RESOLVED | Noted: 2023-04-26 | Resolved: 2024-02-23

## 2024-02-23 PROBLEM — Z86.39 HISTORY OF HYPERCHOLESTEROLEMIA: Status: RESOLVED | Noted: 2024-02-09 | Resolved: 2024-02-23

## 2024-02-23 PROBLEM — L08.9 INFECTION OF SKIN: Status: RESOLVED | Noted: 2024-02-09 | Resolved: 2024-02-23

## 2024-02-23 PROBLEM — F32.0 CURRENT MILD EPISODE OF MAJOR DEPRESSIVE DISORDER WITHOUT PRIOR EPISODE (CMS-HCC): Status: RESOLVED | Noted: 2023-04-26 | Resolved: 2024-02-23

## 2024-02-23 PROBLEM — I63.512 ACUTE ISCHEMIC LEFT MCA STROKE (MULTI): Status: RESOLVED | Noted: 2023-08-01 | Resolved: 2024-02-23

## 2024-02-23 PROBLEM — R41.82 ACUTE ALTERATION IN MENTAL STATUS: Status: RESOLVED | Noted: 2023-04-26 | Resolved: 2024-02-23

## 2024-02-23 PROBLEM — M17.12 OSTEOARTHRITIS OF LEFT KNEE: Status: RESOLVED | Noted: 2024-02-09 | Resolved: 2024-02-23

## 2024-02-23 PROBLEM — Z79.82 LONG TERM (CURRENT) USE OF ASPIRIN: Status: RESOLVED | Noted: 2020-09-21 | Resolved: 2024-02-23

## 2024-02-23 PROBLEM — Z91.81 HISTORY OF FALLING: Status: RESOLVED | Noted: 2020-09-21 | Resolved: 2024-02-23

## 2024-02-23 PROBLEM — K57.92 DIVERTICULITIS OF INTESTINE: Status: RESOLVED | Noted: 2024-02-09 | Resolved: 2024-02-23

## 2024-02-23 PROBLEM — R51.9 HEADACHE DISORDER: Status: RESOLVED | Noted: 2023-05-20 | Resolved: 2024-02-23

## 2024-02-23 PROBLEM — B35.1 ONYCHOMYCOSIS OF TOENAIL: Status: RESOLVED | Noted: 2024-02-09 | Resolved: 2024-02-23

## 2024-02-23 NOTE — PROGRESS NOTES
Patient ID: Stella WILKES Saturday is a 88 y.o. female who is acute skilled care being seen and evaluated for multiple medical problems.  [id number]   1935    /59   Pulse 67   Temp 36.2 °C (97.2 °F)   Resp 16   Wt 56.9 kg (125 lb 6.4 oz)   SpO2 97%   BMI 22.22 kg/m²     Assessment/Plan  Problem List Items Addressed This Visit       Anemia     Ferrous sulfate 325 mg by mouth daily   B12 1000 mcg by mouth daily         Benign essential HTN - Primary     Metoprolol tart 12.5 mg by mouth BID  Amlodipine 2.5 mg by mouth daily          CVA (cerebral vascular accident) (CMS/[facility])     PMH  MRI brain- Acute left MCA territory infarcts  Plavix 75 mg by mouth daily          Hyperlipemia     Atorvastatin 80 mg by mouth daily          Hypothyroidism     Levothyroxine 150 mcg by mouth daily   TSH x 1          Stage 3b chronic kidney disease (CMS/[facility])     Avoid NSAIDS  2/15/2024 BUN/Creat 22/1.36  BMP and CBC with diff x 1         Hypokalemia     Potassium 20 meq by mouth daily   2/15/2024 K- 4.1         Physical deconditioning     PT/OT         Vitamin D deficiency     D2 50,000 units by mouth every Wednesday            Coronary artery disease of native artery of native heart with stable angina pectoris (CMS/[facility])     Isosorbide Mono 30 mg by mouth daily          Type 2 diabetes mellitus without complication, without long-term current use of insulin (CMS/[facility])     Degludec 14 units subcutaneous daily   Lispro SSC as prescribed AC  HGBA1C x 1              HPI: Client was admitted at [facility] from 2/12/2024- 2/15/2024 with the final diagnosis of Stable Angina. She was prescribed Isosorbide [place]. 2/13/2024 CT Chest (-) PE. PMH includes CVA, DM2, CKD, HTN, Hypothyroid, and Anemia. Client denies HA, dizziness, or lightheadedness. Denies CP, current SOB, or increased edema. She C/O nasal congestion and exertional dyspnea. Denies abdominal pain, N/V, diarrhea, or constipation. Denies dysuria. Denies change in appetite. She has  no current C/O pain.     Review of Systems [place]. ROS as described in HPI     Physical Exam  HENT:      Head: Normocephalic.      Mouth/Throat:      Mouth: Mucous membranes are moist.   Cardiovascular:      Rate and Rhythm: Normal rate and regular rhythm.   Pulmonary:      Effort: Pulmonary effort is normal.      Breath sounds: Normal breath sounds.   Abdominal:      General: Bowel sounds are normal.   Genitourinary:     Comments: Denies dysuria   Musculoskeletal:      Cervical back: Neck supple.      Comments: PT/OT  No leg edema    Skin:     General: Skin is warm and dry.   Neurological:      Mental Status: She is alert. Mental status is at baseline.   Psychiatric:         Mood and Affect: Mood normal.      Comments: Conversational

## 2024-02-29 ENCOUNTER — NURSING HOME VISIT (OUTPATIENT)
Dept: POST ACUTE CARE | Facility: EXTERNAL LOCATION | Age: 89
End: 2024-02-29
Payer: MEDICARE

## 2024-02-29 DIAGNOSIS — D50.8 OTHER IRON DEFICIENCY ANEMIA: ICD-10-CM

## 2024-02-29 DIAGNOSIS — I63.9 CEREBROVASCULAR ACCIDENT (CVA), UNSPECIFIED MECHANISM (MULTI): ICD-10-CM

## 2024-02-29 DIAGNOSIS — I10 BENIGN ESSENTIAL HTN: Primary | ICD-10-CM

## 2024-02-29 DIAGNOSIS — E11.9 TYPE 2 DIABETES MELLITUS WITHOUT COMPLICATION, WITHOUT LONG-TERM CURRENT USE OF INSULIN (MULTI): ICD-10-CM

## 2024-02-29 DIAGNOSIS — I25.118 CORONARY ARTERY DISEASE OF NATIVE ARTERY OF NATIVE HEART WITH STABLE ANGINA PECTORIS (CMS-HCC): ICD-10-CM

## 2024-02-29 DIAGNOSIS — N18.32 STAGE 3B CHRONIC KIDNEY DISEASE (MULTI): ICD-10-CM

## 2024-02-29 DIAGNOSIS — R53.81 PHYSICAL DECONDITIONING: ICD-10-CM

## 2024-02-29 PROCEDURE — 99308 SBSQ NF CARE LOW MDM 20: CPT | Performed by: NURSE PRACTITIONER

## 2024-02-29 NOTE — LETTER
Patient: Stella Saturday  : 1935    Encounter Date: 2024    Patient ID: Stella WILKES Saturday is a 88 y.o. female who is skilled being seen and evaluated for multiple medical problems.  [id number]   1935    /59   Pulse 70   Temp 36.6 °C (97.9 °F)   Resp 15   Wt 56.2 kg (124 lb)   SpO2 98%   BMI 21.97 kg/m²     Assessment/Plan  Problem List Items Addressed This Visit       Anemia     Ferrous sulfate 325 mg by mouth daily   B12 1000 mcg by mouth daily  2024 H.H 9.7/29.6         Benign essential HTN - Primary     Metoprolol tart 12.5 mg by mouth BID  Amlodipine 2.5 mg by mouth daily          CVA (cerebral vascular accident) (CMS/[facility])     PMH  MRI brain- Acute left MCA territory infarcts  Plavix 75 mg by mouth daily          Stage 3b chronic kidney disease (CMS/[facility])     Avoid NSAIDS  2/15/2024 BUN/Creat 22/1.36  2024 BUN/Creat 24/1.2, Potassium 4.8         Physical deconditioning     PT/OT         Coronary artery disease of native artery of native heart with stable angina pectoris (CMS/[facility])     Isosorbide Mono 30 mg by mouth daily    Denies CP         Type 2 diabetes mellitus without complication, without long-term current use of insulin (CMS/[facility])     Degludec 14 units subcutaneous daily   Lispro SSC as prescribed AC  2024 HGBA1C 6.3              HPI: Client continues to receive PT/OT services. [place]. She denies HA or dizziness. Denies CP, SOB, or increased edema. RA. She C/O exertional dyspnea- ongoing. Denies abdominal pain, N/V, diarrhea, or constipation. Denies dysuria. Denies change in appetite. She denies current pain.     Review of Systems ROS as described in HPI     Physical Exam  Constitutional:       Comments: Sitting at bedside    HENT:      Mouth/Throat:      Mouth: Mucous membranes are moist.   Cardiovascular:      Rate and Rhythm: Normal rate.   Pulmonary:      Effort: Pulmonary effort is normal.      Breath sounds: Normal breath sounds.      Comments: RA  Abdominal:       General: Bowel sounds are normal.   Genitourinary:     Comments: Denies dysuria   Musculoskeletal:      Comments: PT/OT  No leg edema    Skin:     General: Skin is warm and dry.   Neurological:      Mental Status: She is alert. Mental status is at baseline.   Psychiatric:         Mood and Affect: Mood normal.      Comments: Conversational                    Electronically Signed By: ANUSHKA Angelo   3/2/24 10:31 AM

## 2024-03-02 VITALS
DIASTOLIC BLOOD PRESSURE: 59 MMHG | WEIGHT: 124 LBS | BODY MASS INDEX: 21.97 KG/M2 | TEMPERATURE: 97.9 F | OXYGEN SATURATION: 98 % | SYSTOLIC BLOOD PRESSURE: 112 MMHG | RESPIRATION RATE: 15 BRPM | HEART RATE: 70 BPM

## 2024-03-02 NOTE — PROGRESS NOTES
Patient ID: Stella WILKES Saturday is a 88 y.o. female who is skilled being seen and evaluated for multiple medical problems.  [id number]   1935    /59   Pulse 70   Temp 36.6 °C (97.9 °F)   Resp 15   Wt 56.2 kg (124 lb)   SpO2 98%   BMI 21.97 kg/m²     Assessment/Plan  Problem List Items Addressed This Visit       Anemia     Ferrous sulfate 325 mg by mouth daily   B12 1000 mcg by mouth daily  2/26/2024 H.H 9.7/29.6         Benign essential HTN - Primary     Metoprolol tart 12.5 mg by mouth BID  Amlodipine 2.5 mg by mouth daily          CVA (cerebral vascular accident) (CMS/[facility])     PMH  MRI brain- Acute left MCA territory infarcts  Plavix 75 mg by mouth daily          Stage 3b chronic kidney disease (CMS/[facility])     Avoid NSAIDS  2/15/2024 BUN/Creat 22/1.36  2/26/2024 BUN/Creat 24/1.2, Potassium 4.8         Physical deconditioning     PT/OT         Coronary artery disease of native artery of native heart with stable angina pectoris (CMS/[facility])     Isosorbide Mono 30 mg by mouth daily    Denies CP         Type 2 diabetes mellitus without complication, without long-term current use of insulin (CMS/[facility])     Degludec 14 units subcutaneous daily   Lispro SSC as prescribed AC  2/26/2024 HGBA1C 6.3              HPI: Client continues to receive PT/OT services. [place]. She denies HA or dizziness. Denies CP, SOB, or increased edema. RA. She C/O exertional dyspnea- ongoing. Denies abdominal pain, N/V, diarrhea, or constipation. Denies dysuria. Denies change in appetite. She denies current pain.     Review of Systems ROS as described in HPI     Physical Exam  Constitutional:       Comments: Sitting at bedside    HENT:      Mouth/Throat:      Mouth: Mucous membranes are moist.   Cardiovascular:      Rate and Rhythm: Normal rate.   Pulmonary:      Effort: Pulmonary effort is normal.      Breath sounds: Normal breath sounds.      Comments: RA  Abdominal:      General: Bowel sounds are normal.   Genitourinary:     Comments:  Denies dysuria   Musculoskeletal:      Comments: PT/OT  No leg edema    Skin:     General: Skin is warm and dry.   Neurological:      Mental Status: She is alert. Mental status is at baseline.   Psychiatric:         Mood and Affect: Mood normal.      Comments: Conversational

## 2024-03-07 ENCOUNTER — NURSING HOME VISIT (OUTPATIENT)
Dept: POST ACUTE CARE | Facility: EXTERNAL LOCATION | Age: 89
End: 2024-03-07
Payer: MEDICARE

## 2024-03-07 DIAGNOSIS — I10 BENIGN ESSENTIAL HTN: Primary | ICD-10-CM

## 2024-03-07 DIAGNOSIS — I21.4 NSTEMI (NON-ST ELEVATION MYOCARDIAL INFARCTION) (MULTI): ICD-10-CM

## 2024-03-07 DIAGNOSIS — I25.118 CORONARY ARTERY DISEASE OF NATIVE ARTERY OF NATIVE HEART WITH STABLE ANGINA PECTORIS (CMS-HCC): ICD-10-CM

## 2024-03-07 DIAGNOSIS — N18.32 STAGE 3B CHRONIC KIDNEY DISEASE (MULTI): ICD-10-CM

## 2024-03-07 DIAGNOSIS — E03.9 HYPOTHYROIDISM, UNSPECIFIED TYPE: ICD-10-CM

## 2024-03-07 PROCEDURE — 99308 SBSQ NF CARE LOW MDM 20: CPT | Performed by: NURSE PRACTITIONER

## 2024-03-07 NOTE — LETTER
Patient: Stella Saturday  : 1935    Encounter Date: 2024    Patient ID: Stella WILKES Saturday is a 88 y.o. female who is acute skilled care being seen and evaluated for multiple medical problems.  [id number]   1935    /68   Pulse 80   Temp 36.6 °C (97.8 °F)   Resp 16   Wt 55.8 kg (123 lb)   SpO2 97%   BMI 21.79 kg/m²     Assessment/Plan  Problem List Items Addressed This Visit       Benign essential HTN - Primary     Metoprolol tart 12.5 mg by mouth BID  Amlodipine 2.5 mg by mouth daily          Hypothyroidism     Levothyroxine 150 mcg by mouth daily   3/11/2024- TSH          Stage 3b chronic kidney disease (CMS/[facility])     Avoid NSAIDS  2/15/2024 BUN/Creat 22/1.36  2024 BUN/Creat 24/1.2, Potassium 4.8         NSTEMI (non-ST elevation myocardial infarction) (CMS/[facility])     PMH- Denies CP  C 2023 Stenosis proximal nondominant left Cx, lesion stable   Beta Blocker, Statin, and  Plavix 75 mg by mouth daily             Coronary artery disease of native artery of native heart with stable angina pectoris (CMS/[facility])     Isosorbide Mono 30 mg by mouth daily    Denies CP              HPI: Client continues to receive PT services.  Client denies HA, dizziness, or lightheadedness. Denies CP, SOB, or increased edema. RA. Denies abdominal pain, N/V, diarrhea, or constipation. Denies dysuria. Denies change in appetite. Denies insomnia.     Review of Systems ROS as described in HPI     Physical Exam  Constitutional:       Comments: Working with PT   HENT:      Mouth/Throat:      Mouth: Mucous membranes are moist.   Cardiovascular:      Rate and Rhythm: Normal rate.   Pulmonary:      Effort: Pulmonary effort is normal.      Breath sounds: Normal breath sounds.      Comments: RA  Abdominal:      General: Bowel sounds are normal.   Genitourinary:     Comments: Denies dysuria   Musculoskeletal:      Comments: Slight BLE edema    Skin:     General: Skin is warm and dry.   Neurological:      Mental Status:  She is alert. Mental status is at baseline.   Psychiatric:         Mood and Affect: Mood normal.      Comments: Conversational                    Electronically Signed By: ANUSHKA Angelo   3/10/24  6:42 AM

## 2024-03-10 VITALS
OXYGEN SATURATION: 97 % | RESPIRATION RATE: 16 BRPM | TEMPERATURE: 97.8 F | DIASTOLIC BLOOD PRESSURE: 68 MMHG | SYSTOLIC BLOOD PRESSURE: 128 MMHG | HEART RATE: 80 BPM | WEIGHT: 123 LBS | BODY MASS INDEX: 21.79 KG/M2

## 2024-03-10 NOTE — ASSESSMENT & PLAN NOTE
PMH- Denies CP  [facility] 7/21/2023 Stenosis proximal nondominant left Cx, lesion stable   Beta Blocker, Statin, and  Plavix 75 mg by mouth daily

## 2024-03-10 NOTE — PROGRESS NOTES
Patient ID: Stella WILKES Saturday is a 88 y.o. female who is acute skilled care being seen and evaluated for multiple medical problems.  [id number]   1935    /68   Pulse 80   Temp 36.6 °C (97.8 °F)   Resp 16   Wt 55.8 kg (123 lb)   SpO2 97%   BMI 21.79 kg/m²     Assessment/Plan  Problem List Items Addressed This Visit       Benign essential HTN - Primary     Metoprolol tart 12.5 mg by mouth BID  Amlodipine 2.5 mg by mouth daily          Hypothyroidism     Levothyroxine 150 mcg by mouth daily   3/11/2024- TSH          Stage 3b chronic kidney disease (CMS/[facility])     Avoid NSAIDS  2/15/2024 BUN/Creat 22/1.36  2/26/2024 BUN/Creat 24/1.2, Potassium 4.8         NSTEMI (non-ST elevation myocardial infarction) (CMS/[facility])     PMH- Denies CP  [facility] 7/21/2023 Stenosis proximal nondominant left Cx, lesion stable   Beta Blocker, Statin, and  Plavix 75 mg by mouth daily             Coronary artery disease of native artery of native heart with stable angina pectoris (CMS/[facility])     Isosorbide Mono 30 mg by mouth daily    Denies CP              HPI: Client continues to receive PT services.  Client denies HA, dizziness, or lightheadedness. Denies CP, SOB, or increased edema. RA. Denies abdominal pain, N/V, diarrhea, or constipation. Denies dysuria. Denies change in appetite. Denies insomnia.     Review of Systems ROS as described in HPI     Physical Exam  Constitutional:       Comments: Working with PT   HENT:      Mouth/Throat:      Mouth: Mucous membranes are moist.   Cardiovascular:      Rate and Rhythm: Normal rate.   Pulmonary:      Effort: Pulmonary effort is normal.      Breath sounds: Normal breath sounds.      Comments: RA  Abdominal:      General: Bowel sounds are normal.   Genitourinary:     Comments: Denies dysuria   Musculoskeletal:      Comments: Slight BLE edema    Skin:     General: Skin is warm and dry.   Neurological:      Mental Status: She is alert. Mental status is at baseline.   Psychiatric:         Mood  and Affect: Mood normal.      Comments: Conversational

## 2024-03-21 ENCOUNTER — NURSING HOME VISIT (OUTPATIENT)
Dept: POST ACUTE CARE | Facility: EXTERNAL LOCATION | Age: 89
End: 2024-03-21
Payer: MEDICARE

## 2024-03-21 DIAGNOSIS — E11.9 TYPE 2 DIABETES MELLITUS WITHOUT COMPLICATION, WITHOUT LONG-TERM CURRENT USE OF INSULIN (MULTI): ICD-10-CM

## 2024-03-21 DIAGNOSIS — E87.6 HYPOKALEMIA: ICD-10-CM

## 2024-03-21 DIAGNOSIS — N18.32 STAGE 3B CHRONIC KIDNEY DISEASE (MULTI): ICD-10-CM

## 2024-03-21 DIAGNOSIS — E03.9 HYPOTHYROIDISM, UNSPECIFIED TYPE: ICD-10-CM

## 2024-03-21 DIAGNOSIS — E78.2 MIXED HYPERLIPIDEMIA: ICD-10-CM

## 2024-03-21 DIAGNOSIS — I25.118 CORONARY ARTERY DISEASE OF NATIVE ARTERY OF NATIVE HEART WITH STABLE ANGINA PECTORIS (CMS-HCC): ICD-10-CM

## 2024-03-21 DIAGNOSIS — E55.9 VITAMIN D DEFICIENCY: ICD-10-CM

## 2024-03-21 DIAGNOSIS — I10 BENIGN ESSENTIAL HTN: Primary | ICD-10-CM

## 2024-03-21 DIAGNOSIS — R53.81 PHYSICAL DECONDITIONING: ICD-10-CM

## 2024-03-21 DIAGNOSIS — M62.838 MUSCLE SPASM: ICD-10-CM

## 2024-03-21 DIAGNOSIS — D50.9 IRON DEFICIENCY ANEMIA, UNSPECIFIED IRON DEFICIENCY ANEMIA TYPE: ICD-10-CM

## 2024-03-21 DIAGNOSIS — I63.9 CEREBROVASCULAR ACCIDENT (CVA), UNSPECIFIED MECHANISM (MULTI): ICD-10-CM

## 2024-03-21 DIAGNOSIS — G45.9 TIA (TRANSIENT ISCHEMIC ATTACK): ICD-10-CM

## 2024-03-21 PROCEDURE — 99309 SBSQ NF CARE MODERATE MDM 30: CPT | Performed by: NURSE PRACTITIONER

## 2024-03-21 NOTE — LETTER
Patient: Stella Saturday  : 1935    Encounter Date: 2024    Patient ID: Stella WILKES Saturday is a 88 y.o. female who is acute skilled care being seen and evaluated for multiple medical problems.  [id number]   1935    /64   Pulse 67   Temp 36.8 °C (98.2 °F)   Resp 16   Wt 55.8 kg (123 lb)   SpO2 98%   BMI 21.79 kg/m²     Assessment/Plan  Problem List Items Addressed This Visit       Anemia     Ferrous sulfate 325 mg by mouth daily   B12 1000 mcg by mouth daily  2024 H.H 9.7/29.6         Benign essential HTN - Primary     Metoprolol tart 12.5 mg by mouth BID  Amlodipine 2.5 mg by mouth daily   ASA 81 mg by mouth daily (ME 2024)          CVA (cerebral vascular accident) (CMS/[facility])     [facility]  MRI brain- Acute left MCA territory infarcts  Plavix 75 mg by mouth daily          Hyperlipemia     Atorvastatin 80 mg by mouth daily   Fish oil 1000 mg by mouth daily   Fenofibrate 54 mg by mouth daily          Hypothyroidism     Levothyroxine 100 mcg by mouth daily (Decreased)   3/11/2024 TSH 0.053             Stage 3b chronic kidney disease (CMS/[facility])     Avoid NSAIDS  2/15/2024 BUN/Creat 22/1.36  2024 BUN/Creat 24/1.2, Potassium 4.8         Hypokalemia     Potassium 20 meq by mouth daily   2024 K- 4.8         Physical deconditioning     PT/OT         Vitamin D deficiency     D2 50,000 units by mouth every Wednesday             Coronary artery disease of native artery of native heart with stable angina pectoris (CMS/[facility])     Isosorbide Mono 30 mg by mouth daily    Denies CP         TIA (transient ischemic attack)     [facility] 3/13/2024- 3/14/2024   MRI Brain (-) for acute finding   DAPT x 21 days then Plavix only          Type 2 diabetes mellitus without complication, without long-term current use of insulin (CMS/[facility])     Degludec 14 units subcutaneous daily   Lispro SSC as prescribed AC  2024 HGBA1C 6.3         Muscle spasm     Tizanidine 2 mg by mouth daily               HPI: Client  was admitted at [facility] from 3/13/2024- 3/14/224 with the final diagnosis of TIA. She was evaluated at hospital for Right sided weakness, symptoms resolved. Neurology consulted and recommended DAPT x 21 days then Plavix only. MRI brain (-) for acute finding. Client denies HA, dizziness, or lightheadedness. Denies CP, SOB, or increased edema. RA. Denies abdominal pain, N/V, diarrhea, or constipation. Denies dysuria. Denies change in appetite. She has no current C/O pain. Denies right sided weakness.      Review of Systems ROS as described in HPI     Physical Exam  Constitutional:       Comments: Assisted client to ambulate with walker to room   HENT:      Mouth/Throat:      Mouth: Mucous membranes are moist.   Cardiovascular:      Rate and Rhythm: Normal rate.   Pulmonary:      Effort: Pulmonary effort is normal.      Breath sounds: Normal breath sounds.      Comments: RA  Abdominal:      General: Bowel sounds are normal.   Genitourinary:     Comments: Denies dysuria   Musculoskeletal:      Comments: PT/OT   Walker  Hand grasps equal   No leg edema    Skin:     General: Skin is warm and dry.   Neurological:      Mental Status: She is alert. Mental status is at baseline.   Psychiatric:         Mood and Affect: Mood normal.      Comments: Conversational                    Electronically Signed By: SD Angelo-CNP   3/22/24  9:35 AM

## 2024-03-22 VITALS
BODY MASS INDEX: 21.79 KG/M2 | TEMPERATURE: 98.2 F | HEART RATE: 67 BPM | DIASTOLIC BLOOD PRESSURE: 64 MMHG | OXYGEN SATURATION: 98 % | WEIGHT: 123 LBS | RESPIRATION RATE: 16 BRPM | SYSTOLIC BLOOD PRESSURE: 128 MMHG

## 2024-03-22 PROBLEM — M62.838 MUSCLE SPASM: Status: ACTIVE | Noted: 2024-03-22

## 2024-03-22 PROBLEM — E78.1 HYPERTRIGLYCERIDEMIA: Status: RESOLVED | Noted: 2023-04-26 | Resolved: 2024-03-22

## 2024-03-22 NOTE — ASSESSMENT & PLAN NOTE
[facility] 3/13/2024- 3/14/2024   MRI Brain (-) for acute finding   DAPT x 21 days then Plavix only

## 2024-03-22 NOTE — ASSESSMENT & PLAN NOTE
Metoprolol tart 12.5 mg by mouth BID  Amlodipine 2.5 mg by mouth daily   ASA 81 mg by mouth daily (DC 4/4/2024)

## 2024-03-22 NOTE — PROGRESS NOTES
Patient ID: Stella WILKES Saturday is a 88 y.o. female who is acute skilled care being seen and evaluated for multiple medical problems.  [id number]   1935    /64   Pulse 67   Temp 36.8 °C (98.2 °F)   Resp 16   Wt 55.8 kg (123 lb)   SpO2 98%   BMI 21.79 kg/m²     Assessment/Plan  Problem List Items Addressed This Visit       Anemia     Ferrous sulfate 325 mg by mouth daily   B12 1000 mcg by mouth daily  2/26/2024 H.H 9.7/29.6         Benign essential HTN - Primary     Metoprolol tart 12.5 mg by mouth BID  Amlodipine 2.5 mg by mouth daily   ASA 81 mg by mouth daily (DC 4/4/2024)          CVA (cerebral vascular accident) (CMS/[facility])     PMH  MRI brain- Acute left MCA territory infarcts  Plavix 75 mg by mouth daily          Hyperlipemia     Atorvastatin 80 mg by mouth daily   Fish oil 1000 mg by mouth daily   Fenofibrate 54 mg by mouth daily          Hypothyroidism     Levothyroxine 100 mcg by mouth daily (Decreased)   3/11/2024 TSH 0.053             Stage 3b chronic kidney disease (CMS/[facility])     Avoid NSAIDS  2/15/2024 BUN/Creat 22/1.36  2/26/2024 BUN/Creat 24/1.2, Potassium 4.8         Hypokalemia     Potassium 20 meq by mouth daily   2/26/2024 K- 4.8         Physical deconditioning     PT/OT         Vitamin D deficiency     D2 50,000 units by mouth every Wednesday             Coronary artery disease of native artery of native heart with stable angina pectoris (CMS/[facility])     Isosorbide Mono 30 mg by mouth daily    Denies CP         TIA (transient ischemic attack)     [facility] 3/13/2024- 3/14/2024   MRI Brain (-) for acute finding   DAPT x 21 days then Plavix only          Type 2 diabetes mellitus without complication, without long-term current use of insulin (CMS/[facility])     Degludec 14 units subcutaneous daily   Lispro SSC as prescribed AC  2/26/2024 HGBA1C 6.3         Muscle spasm     Tizanidine 2 mg by mouth daily               HPI: Client was admitted at [facility] from 3/13/2024- 3/14/224 with the final diagnosis of  TIA. She was evaluated at hospital for Right sided weakness, symptoms resolved. Neurology consulted and recommended DAPT x 21 days then Plavix only. MRI brain (-) for acute finding. Client denies HA, dizziness, or lightheadedness. Denies CP, SOB, or increased edema. RA. Denies abdominal pain, N/V, diarrhea, or constipation. Denies dysuria. Denies change in appetite. She has no current C/O pain. Denies right sided weakness.      Review of Systems ROS as described in HPI     Physical Exam  Constitutional:       Comments: Assisted client to ambulate with walker to room   HENT:      Mouth/Throat:      Mouth: Mucous membranes are moist.   Cardiovascular:      Rate and Rhythm: Normal rate.   Pulmonary:      Effort: Pulmonary effort is normal.      Breath sounds: Normal breath sounds.      Comments: RA  Abdominal:      General: Bowel sounds are normal.   Genitourinary:     Comments: Denies dysuria   Musculoskeletal:      Comments: PT/OT   Walker  Hand grasps equal   No leg edema    Skin:     General: Skin is warm and dry.   Neurological:      Mental Status: She is alert. Mental status is at baseline.   Psychiatric:         Mood and Affect: Mood normal.      Comments: Conversational

## 2024-03-22 NOTE — ASSESSMENT & PLAN NOTE
Atorvastatin 80 mg by mouth daily   Fish oil 1000 mg by mouth daily   Fenofibrate 54 mg by mouth daily

## 2024-03-28 ENCOUNTER — NURSING HOME VISIT (OUTPATIENT)
Dept: POST ACUTE CARE | Facility: EXTERNAL LOCATION | Age: 89
End: 2024-03-28
Payer: MEDICARE

## 2024-03-28 VITALS
SYSTOLIC BLOOD PRESSURE: 134 MMHG | BODY MASS INDEX: 21.79 KG/M2 | WEIGHT: 123 LBS | HEART RATE: 74 BPM | RESPIRATION RATE: 15 BRPM | OXYGEN SATURATION: 98 % | TEMPERATURE: 97.4 F | DIASTOLIC BLOOD PRESSURE: 68 MMHG

## 2024-03-28 DIAGNOSIS — R53.81 PHYSICAL DECONDITIONING: ICD-10-CM

## 2024-03-28 DIAGNOSIS — I25.118 CORONARY ARTERY DISEASE OF NATIVE ARTERY OF NATIVE HEART WITH STABLE ANGINA PECTORIS (CMS-HCC): ICD-10-CM

## 2024-03-28 DIAGNOSIS — I63.9 CEREBROVASCULAR ACCIDENT (CVA), UNSPECIFIED MECHANISM (MULTI): ICD-10-CM

## 2024-03-28 DIAGNOSIS — G45.9 TIA (TRANSIENT ISCHEMIC ATTACK): ICD-10-CM

## 2024-03-28 DIAGNOSIS — I10 BENIGN ESSENTIAL HTN: Primary | ICD-10-CM

## 2024-03-28 PROCEDURE — 99308 SBSQ NF CARE LOW MDM 20: CPT | Performed by: NURSE PRACTITIONER

## 2024-03-28 NOTE — ASSESSMENT & PLAN NOTE
[facility] 3/13/2024- 3/14/2024   MRI Brain (-) for acute finding   DAPT x 21 days then Plavix only

## 2024-03-28 NOTE — PROGRESS NOTES
Patient ID: Stella WILKES Saturday is a 88 y.o. female who is acute skilled care being seen and evaluated for multiple medical problems.  [id number]   1935    /68   Pulse 74   Temp 36.3 °C (97.4 °F)   Resp 15   Wt 55.8 kg (123 lb)   SpO2 98%   BMI 21.79 kg/m²     Assessment/Plan  Problem List Items Addressed This Visit       Benign essential HTN - Primary     Metoprolol tart 12.5 mg by mouth BID  Amlodipine 2.5 mg by mouth daily   ASA 81 mg by mouth daily (DC 4/4/2024)          CVA (cerebral vascular accident) (CMS/[facility])     PMH  MRI brain- Acute left MCA territory infarcts  Plavix 75 mg by mouth daily          Physical deconditioning     PT/OT         Coronary artery disease of native artery of native heart with stable angina pectoris (CMS/[facility])     Isosorbide Mono 30 mg by mouth daily    Denies CP         TIA (transient ischemic attack)     [facility] 3/13/2024- 3/14/2024   MRI Brain (-) for acute finding   DAPT x 21 days then Plavix only               HPI: Client continues to receive PT/OT services. She is pending DC to home Saturday. [place]. Client denies HA, dizziness, or lightheadedness. Denies CP, SOB, Cough, or increased edema. RA. Denies abdominal pain, N/V, diarrhea, or constipation. Denies dysuria. Denies change in appetite. She has no current C/O pain.     Review of Systems ROS as described in HPI     Physical Exam  Constitutional:       Comments: Ambulating in room with walker    HENT:      Mouth/Throat:      Mouth: Mucous membranes are moist.   Cardiovascular:      Rate and Rhythm: Normal rate.   Pulmonary:      Effort: Pulmonary effort is normal.      Breath sounds: Normal breath sounds.      Comments: RA  Abdominal:      General: Bowel sounds are normal.   Genitourinary:     Comments: Denies dysuria   Musculoskeletal:      Comments: PT/OT  No leg edema    Skin:     General: Skin is warm and dry.   Neurological:      Mental Status: She is alert. Mental status is at baseline.   Psychiatric:         Mood  and Affect: Mood normal.      Comments: Conversational

## 2024-03-28 NOTE — LETTER
Patient: Stella Saturday  : 1935    Encounter Date: 2024    Patient ID: Stella WILKES Saturday is a 88 y.o. female who is acute skilled care being seen and evaluated for multiple medical problems.  [id number]   1935    /68   Pulse 74   Temp 36.3 °C (97.4 °F)   Resp 15   Wt 55.8 kg (123 lb)   SpO2 98%   BMI 21.79 kg/m²     Assessment/Plan  Problem List Items Addressed This Visit       Benign essential HTN - Primary     Metoprolol tart 12.5 mg by mouth BID  Amlodipine 2.5 mg by mouth daily   ASA 81 mg by mouth daily (DC 2024)          CVA (cerebral vascular accident) (CMS/[facility])     PMH  MRI brain- Acute left MCA territory infarcts  Plavix 75 mg by mouth daily          Physical deconditioning     PT/OT         Coronary artery disease of native artery of native heart with stable angina pectoris (CMS/[facility])     Isosorbide Mono 30 mg by mouth daily    Denies CP         TIA (transient ischemic attack)     [facility] 3/13/2024- 3/14/2024   MRI Brain (-) for acute finding   DAPT x 21 days then Plavix only               HPI: Client continues to receive PT/OT services. She is pending DC to home Saturday. [place]. Client denies HA, dizziness, or lightheadedness. Denies CP, SOB, Cough, or increased edema. RA. Denies abdominal pain, N/V, diarrhea, or constipation. Denies dysuria. Denies change in appetite. She has no current C/O pain.     Review of Systems ROS as described in HPI     Physical Exam  Constitutional:       Comments: Ambulating in room with walker    HENT:      Mouth/Throat:      Mouth: Mucous membranes are moist.   Cardiovascular:      Rate and Rhythm: Normal rate.   Pulmonary:      Effort: Pulmonary effort is normal.      Breath sounds: Normal breath sounds.      Comments: RA  Abdominal:      General: Bowel sounds are normal.   Genitourinary:     Comments: Denies dysuria   Musculoskeletal:      Comments: PT/OT  No leg edema    Skin:     General: Skin is warm and dry.   Neurological:      Mental  Status: She is alert. Mental status is at baseline.   Psychiatric:         Mood and Affect: Mood normal.      Comments: Conversational                    Electronically Signed By: ANUSHKA Angelo   3/28/24 12:24 PM

## 2024-04-19 PROBLEM — U07.1 DISEASE DUE TO SEVERE ACUTE RESPIRATORY SYNDROME CORONAVIRUS 2 (SARS-COV-2): Status: ACTIVE | Noted: 2024-04-19

## 2024-04-19 PROBLEM — H35.00 RETINAL VASCULAR DISORDER: Status: ACTIVE | Noted: 2023-08-17

## 2024-04-22 ENCOUNTER — OFFICE VISIT (OUTPATIENT)
Dept: PRIMARY CARE | Facility: CLINIC | Age: 89
End: 2024-04-22
Payer: MEDICARE

## 2024-04-22 VITALS
DIASTOLIC BLOOD PRESSURE: 58 MMHG | WEIGHT: 132 LBS | OXYGEN SATURATION: 99 % | SYSTOLIC BLOOD PRESSURE: 133 MMHG | HEIGHT: 62 IN | HEART RATE: 51 BPM | BODY MASS INDEX: 24.29 KG/M2

## 2024-04-22 DIAGNOSIS — I11.9 HYPERTENSIVE HEART DISEASE WITHOUT HEART FAILURE: ICD-10-CM

## 2024-04-22 DIAGNOSIS — L60.2 OVERGROWN TOENAILS: Primary | ICD-10-CM

## 2024-04-22 DIAGNOSIS — K08.9 POOR DENTITION: ICD-10-CM

## 2024-04-22 DIAGNOSIS — E11.9 TYPE 2 DIABETES MELLITUS WITHOUT COMPLICATION, WITHOUT LONG-TERM CURRENT USE OF INSULIN (MULTI): ICD-10-CM

## 2024-04-22 DIAGNOSIS — G45.9 TIA (TRANSIENT ISCHEMIC ATTACK): ICD-10-CM

## 2024-04-22 DIAGNOSIS — F32.A DEPRESSION, UNSPECIFIED DEPRESSION TYPE: ICD-10-CM

## 2024-04-22 DIAGNOSIS — E03.9 HYPOTHYROIDISM, UNSPECIFIED TYPE: ICD-10-CM

## 2024-04-22 DIAGNOSIS — R53.1 GENERALIZED WEAKNESS: ICD-10-CM

## 2024-04-22 PROCEDURE — 1159F MED LIST DOCD IN RCRD: CPT | Performed by: INTERNAL MEDICINE

## 2024-04-22 PROCEDURE — 3078F DIAST BP <80 MM HG: CPT | Performed by: INTERNAL MEDICINE

## 2024-04-22 PROCEDURE — 1126F AMNT PAIN NOTED NONE PRSNT: CPT | Performed by: INTERNAL MEDICINE

## 2024-04-22 PROCEDURE — 1157F ADVNC CARE PLAN IN RCRD: CPT | Performed by: INTERNAL MEDICINE

## 2024-04-22 PROCEDURE — 1160F RVW MEDS BY RX/DR IN RCRD: CPT | Performed by: INTERNAL MEDICINE

## 2024-04-22 PROCEDURE — 3075F SYST BP GE 130 - 139MM HG: CPT | Performed by: INTERNAL MEDICINE

## 2024-04-22 PROCEDURE — 99214 OFFICE O/P EST MOD 30 MIN: CPT | Performed by: INTERNAL MEDICINE

## 2024-04-22 RX ORDER — AMLODIPINE BESYLATE 2.5 MG/1
2.5 TABLET ORAL DAILY
Qty: 90 TABLET | Refills: 1 | Status: SHIPPED | OUTPATIENT
Start: 2024-04-22 | End: 2024-04-30 | Stop reason: SDUPTHER

## 2024-04-22 RX ORDER — FENOFIBRATE 54 MG/1
54 TABLET ORAL DAILY
COMMUNITY
End: 2024-04-30 | Stop reason: SDUPTHER

## 2024-04-22 RX ORDER — ATORVASTATIN CALCIUM 40 MG/1
40 TABLET, FILM COATED ORAL DAILY
Qty: 90 TABLET | Refills: 1 | Status: SHIPPED | OUTPATIENT
Start: 2024-04-22 | End: 2024-10-19

## 2024-04-22 RX ORDER — BUPROPION HYDROCHLORIDE 150 MG/1
150 TABLET ORAL EVERY MORNING
Qty: 90 TABLET | Refills: 0 | Status: SHIPPED | OUTPATIENT
Start: 2024-04-22 | End: 2024-04-29

## 2024-04-22 RX ORDER — ISOSORBIDE MONONITRATE 30 MG/1
30 TABLET, EXTENDED RELEASE ORAL DAILY
Qty: 90 TABLET | Refills: 1 | Status: SHIPPED | OUTPATIENT
Start: 2024-04-22 | End: 2024-04-30 | Stop reason: SDUPTHER

## 2024-04-22 RX ORDER — INSULIN LISPRO 100 [IU]/ML
0-15 INJECTION, SOLUTION INTRAVENOUS; SUBCUTANEOUS
Qty: 13.5 ML | Refills: 0 | Status: CANCELLED | OUTPATIENT
Start: 2024-04-22 | End: 2024-05-22

## 2024-04-22 RX ORDER — LEVOTHYROXINE SODIUM 100 UG/1
100 TABLET ORAL
COMMUNITY
End: 2024-04-25 | Stop reason: SDUPTHER

## 2024-04-22 ASSESSMENT — PATIENT HEALTH QUESTIONNAIRE - PHQ9
2. FEELING DOWN, DEPRESSED OR HOPELESS: NOT AT ALL
1. LITTLE INTEREST OR PLEASURE IN DOING THINGS: NOT AT ALL
SUM OF ALL RESPONSES TO PHQ9 QUESTIONS 1 AND 2: 0

## 2024-04-22 ASSESSMENT — PAIN SCALES - GENERAL: PAINLEVEL: 0-NO PAIN

## 2024-04-22 NOTE — PATIENT INSTRUCTIONS
It was great to see you in the office today! Here is what we discussed at your visit today:  Please continue to take your current medications    Decrease Atorvastatin to 40mg daily  We will start Bupropion XL 150mg daily to help with energy and mood, take daily in am  We will refer you to podiatry for toenail trimming, schedule appointment when you are able  Follow up in four months

## 2024-04-22 NOTE — PROGRESS NOTES
"Patient ID: She states that she has been having issues with right leg pain which is not new for her. She states she has been eating ok. She checks her sugar prior to eating three times per day. She states her sugars have been well controlled. She states that she has shaking all over her body at times. Her sugars have been slightly higher. She states that she feels that the iron pill gives her diarrhea and makes her stool black. She states she feels tired a lot and does not feeling like doing anything. She states she is tired a lot of the time. She states that she lost her sister a few weeks ago and her brother a couple years ago and she thinks about them often. She would like to start on a medication to help improve her \"get up and go\".    Hospitalization Discharge Follow Up:    ANIBAL WILKES Saturday is a 88 y.o. female with PMH remarkable for HTN, IDDM-II, hypothyroidism, CKD  who presents to the office today for skilled nursing facility stay follow up. She was most recently seen in the office on 02/12/24, had complaint of chest pain, and was advised to go to ER for further evaluation. She was then discharged to SNF on 02/15/24 with discharge diagnosis of stable angina.  She then returned to the ER on 03/13/24 due to AMS, slurred speech and right side weakness.  CTH did not show any signs of stroke, ER physician discussed patient's case with teleneurologist and per hospital notes, decision was made to not give TNK as she was poor candidate of anticoagulation.  Per discharge summary, patient's symptoms resolved shortly afterwards. MRI of the brain did not show any acute intracranial findings, no acute infarct.  Noted microhemorrhages throughout cerebral hemisphere with sparing of the deep gray nuclei, consistent with cerebral amyloid angiopathy.  Patient was evaluated by neurology and was recommended to continue DAPT for 21 days, followed by Plavix only.  Recommended to continue statin therapy and added fenofibrate.  " "Patient was evaluated by PT/OT and was recommended to return to rehab facility.  Patient's symptoms were resolved and there were no further signs of focal deficits.  Echocardiogram was performed on 02/13/24 which revealed:  1. Left ventricular systolic function is normal with a 65-70% estimated ejection fraction.2. Spectral Doppler shows an impaired relaxation pattern of left ventricular diastolic filling. 3. There is moderate concentric left ventricular hypertrophy.4. Mild aortic valve stenosis. 5. There is moderate aortic valve cusp calcification..  Given patient's symptom resolution, patient was deemed appropriate for discharge. She was discharged back to SNF ([facility]) on 03/14/24.    Social History     Tobacco Use    Smoking status: Never    Smokeless tobacco: Never   Vaping Use    Vaping status: Never Used   Substance Use Topics    Alcohol use: Never    Drug use: Never       Immunization History   Administered Date(s) Administered    Influenza, High Dose Seasonal, Preservative Free 10/11/2016, 10/11/2017, 09/11/2018, 09/14/2020, 11/01/2021, 09/22/2022    Influenza, Unspecified 09/18/2013, 10/03/2019    Influenza, seasonal, injectable 10/08/2012    Moderna SARS-CoV-2 Vaccination 01/30/2021, 02/27/2021, 08/30/2022    Pneumococcal polysaccharide vaccine, 23-valent, age 2 years and older (PNEUMOVAX 23) 10/03/2019     Review of Systems   Constitutional: Negative.    Respiratory: Negative.     Cardiovascular: Negative.    Gastrointestinal: Negative.    Genitourinary: Negative.    Musculoskeletal:  Positive for gait problem.   Neurological:  Positive for weakness.   Psychiatric/Behavioral:  Positive for dysphoric mood.        Visit Vitals  Vitals:    04/22/24 0946   BP: 133/58   BP Location: Right arm   Pulse: 51   SpO2: 99%   Weight: 59.9 kg (132 lb)   Height: 1.575 m (5' 2\")     OB Status Postmenopausal   Smoking Status Never     Allergies   Allergen Reactions    Metformin Diarrhea    Penicillins Nausea Only and " Other     SOB    Sulfa (Sulfonamide Antibiotics) Unknown    Allopurinol Itching and Rash      Physical Exam  Vitals reviewed.   Constitutional:       Appearance: Normal appearance.   HENT:      Head: Normocephalic and atraumatic.   Cardiovascular:      Rate and Rhythm: Normal rate and regular rhythm.      Pulses: Normal pulses.      Heart sounds: Normal heart sounds.   Pulmonary:      Effort: Pulmonary effort is normal.      Breath sounds: Normal breath sounds.   Abdominal:      General: Bowel sounds are normal.      Palpations: Abdomen is soft.   Musculoskeletal:         General: Normal range of motion.   Neurological:      General: No focal deficit present.      Mental Status: She is alert and oriented to person, place, and time.   Psychiatric:         Mood and Affect: Mood normal.         Behavior: Behavior normal.         Current Outpatient Medications   Medication Instructions    acetaminophen (Tylenol) 325 mg tablet oral    amLODIPine (Norvasc) 2.5 mg tablet 1 tablet, oral, Daily    atorvastatin (LIPITOR) 80 mg, oral, Nightly    Autolet (Accu-Chek FastClix Lancing Dev) lancing device Use as instructed    blood sugar diagnostic (True Metrix Glucose Test Strip) strip 1 strip, miscellaneous, 3 times daily    clopidogrel (PLAVIX) 75 mg, oral, Daily    cyanocobalamin (VITAMIN B-12) 1,000 mcg, oral, Daily    docusate sodium (Colace) 100 mg capsule oral    ergocalciferol (VITAMIN D-2) 1,250 mcg, oral, Once Weekly    FeroSuL tablet 1 tablet, oral, Daily, as directed    fluticasone (Flonase) 50 mcg/actuation nasal spray 2 sprays, Each Nostril, Daily, Shake gently. Before first use, prime pump. After use, clean tip and replace cap.    FreeStyle glucose monitoring (True Metrix Glucose Meter) kit Use as directed    glucose 4 gram chewable tablet 8 g AS DIRECTED (route: BY MOUTH)    insulin degludec (TRESIBA FLEXTOUCH U-100) 14 Units, subcutaneous, Every morning    insulin lispro (HUMALOG) 0-15 Units, subcutaneous, 3 times  "daily with meals, Take as directed per insulin instructions.    isosorbide mononitrate ER (IMDUR) 30 mg, oral, Daily, Do not crush or chew.    latanoprost (Xalatan) 0.005 % ophthalmic solution 1 drop, Both Eyes, Daily    levothyroxine (SYNTHROID, LEVOXYL) 150 mcg, oral, Daily    lidocaine (Lidoderm) 5 % patch Topical    metoprolol tartrate (LOPRESSOR) 12.5 mg, oral, Every 12 hours scheduled    omega 3-dha-epa-fish oil (Fish OiL) 1,000 mg (120 mg-180 mg) capsule oral    pen needle, diabetic 31 gauge x 5/16\" needle Use 1 time daily with insulin pen    potassium chloride CR 20 mEq ER tablet 20 mEq, oral, Daily, Do not crush, chew, or split.    tiZANidine (ZANAFLEX) 2 mg, oral, Daily    triamcinolone (Kenalog) 0.1 % cream 1 Application, Topical, 2 times daily, For nostrils        Lab Results   Component Value Date    WBC 8.1 02/15/2024    HGB 10.0 (L) 02/15/2024    HCT 32.1 (L) 02/15/2024     02/15/2024    CHOL 99 02/13/2024    TRIG 184 (H) 02/13/2024    HDL 22.1 02/13/2024    LDLDIRECT 95 01/17/2019    ALT 11 02/12/2024    AST 16 02/12/2024     02/15/2024    K 5.0 02/15/2024     (H) 02/15/2024    CREATININE 1.39 (H) 02/15/2024    BUN 24 (H) 02/15/2024    CO2 16 (L) 02/15/2024    TSH 0.04 (L) 01/30/2024    INR 1.1 02/13/2024    HGBA1C 6.6 (H) 03/14/2024     Assessment/Plan   Diagnoses and all orders for this visit:  Overgrown toenails  -     Referral to Podiatry; Future    Hypertensive heart disease without heart failure  -     isosorbide mononitrate ER (Imdur) 30 mg 24 hr tablet; Take 1 tablet (30 mg) by mouth once daily. Do not crush or chew.  -     amLODIPine (Norvasc) 2.5 mg tablet; Take 1 tablet (2.5 mg) by mouth once daily.    Type 2 diabetes mellitus without complication, without long-term current use of insulin (Multi)  -  most recent hgbA1c on 03/14/24 was 6.6  -  stop humalog insulin and sliding scale  -  continue with Tresiba 14 units daily, continue with blood sugar monitoring    TIA " (transient ischemic attack)/Generalized weakness        -     reviewed MRI of brain  -     atorvastatin (Lipitor) 40 mg tablet; Take 1 tablet (40 mg) by mouth once daily.  -     will decrease lipitor to 40mg daily  -    discussed importance of taking antiplatelet and lipid lowering medications  -    she received home therapy for one month after discharge from SNF, but still has difficulty ambulating long distances, uses cane for ambulation, she would benefit from continued physical therapy as she still has weakness, difficulty ambulating long distances, her nephew reports it is harder for her to get around at home, as she lives by herself  -     continue with antihypertensive medications    Depression, unspecified depression type  -    start buPROPion XL (Wellbutrin XL) 150 mg 24 hr tablet; Take 1 tablet (150 mg) by mouth once daily in the morning. Do not crush, chew, or split.    Poor dentition  -she has multiple decayed and broken teeth, advised she will have to wait six months from mini stroke which was in March as we will have to stop Plavix for seven days for procedure    Hypothyroidism  - Levothyroxine was decreased during inpatient stay, reviewed bloodwork from March, advised to continue with 100mcg daily    - follow up four months  --------------------  Written by Abida Li LPN, acting as a scribe for Dr. Jolley. This note accurately reflects the work and decisions made by Dr. Jolley.     I, Dr. Jolley, attest all medical record entries made by the scribe were under my direction and were personally dictated by me. I have reviewed the chart and agree that the record accurately reflects my performance of the history, physical exam, and assessment and plan.

## 2024-04-23 PROBLEM — R53.1 GENERALIZED WEAKNESS: Status: ACTIVE | Noted: 2023-04-26

## 2024-04-23 ASSESSMENT — ENCOUNTER SYMPTOMS
GASTROINTESTINAL NEGATIVE: 1
DYSPHORIC MOOD: 1
WEAKNESS: 1
RESPIRATORY NEGATIVE: 1
CARDIOVASCULAR NEGATIVE: 1
CONSTITUTIONAL NEGATIVE: 1

## 2024-04-24 PROCEDURE — G0180 MD CERTIFICATION HHA PATIENT: HCPCS | Performed by: INTERNAL MEDICINE

## 2024-04-25 DIAGNOSIS — E03.9 HYPOTHYROIDISM, UNSPECIFIED TYPE: ICD-10-CM

## 2024-04-25 RX ORDER — LEVOTHYROXINE SODIUM 100 UG/1
100 TABLET ORAL
Qty: 90 TABLET | Refills: 1 | Status: SHIPPED | OUTPATIENT
Start: 2024-04-25

## 2024-04-25 NOTE — TELEPHONE ENCOUNTER
Est pt seen Monday this medication was decreased to 100mcg from 150mcg in Mondays appt. TSH was <0.015 on 3-13

## 2024-04-27 DIAGNOSIS — I11.9 HYPERTENSIVE HEART DISEASE WITHOUT HEART FAILURE: ICD-10-CM

## 2024-04-27 DIAGNOSIS — F32.A DEPRESSION, UNSPECIFIED DEPRESSION TYPE: ICD-10-CM

## 2024-04-27 DIAGNOSIS — E78.2 MIXED HYPERLIPIDEMIA: ICD-10-CM

## 2024-04-29 RX ORDER — BUPROPION HYDROCHLORIDE 150 MG/1
150 TABLET ORAL EVERY MORNING
Qty: 90 TABLET | Refills: 0 | Status: SHIPPED | OUTPATIENT
Start: 2024-04-29 | End: 2024-05-07

## 2024-04-30 RX ORDER — ISOSORBIDE MONONITRATE 30 MG/1
30 TABLET, EXTENDED RELEASE ORAL DAILY
Qty: 90 TABLET | Refills: 1 | Status: SHIPPED | OUTPATIENT
Start: 2024-04-30 | End: 2024-10-27

## 2024-04-30 RX ORDER — FENOFIBRATE 54 MG/1
54 TABLET ORAL DAILY
Qty: 90 TABLET | Refills: 1 | Status: SHIPPED | OUTPATIENT
Start: 2024-04-30

## 2024-04-30 RX ORDER — AMLODIPINE BESYLATE 2.5 MG/1
2.5 TABLET ORAL DAILY
Qty: 90 TABLET | Refills: 1 | Status: SHIPPED | OUTPATIENT
Start: 2024-04-30

## 2024-05-06 DIAGNOSIS — F32.A DEPRESSION, UNSPECIFIED DEPRESSION TYPE: ICD-10-CM

## 2024-05-07 RX ORDER — BUPROPION HYDROCHLORIDE 150 MG/1
150 TABLET ORAL EVERY MORNING
Qty: 90 TABLET | Refills: 0 | Status: SHIPPED | OUTPATIENT
Start: 2024-05-07 | End: 2024-08-05

## 2024-06-17 ENCOUNTER — TELEPHONE (OUTPATIENT)
Dept: PRIMARY CARE | Facility: CLINIC | Age: 89
End: 2024-06-17
Payer: MEDICARE

## 2024-06-17 NOTE — TELEPHONE ENCOUNTER
Patients nephew called in asking if she can get an order for diabetic shoes to be sent to [facility]. She was seen on 4/22/24, but they are willing to make another appointment if needed to get the order. Please advise.

## 2024-06-26 DIAGNOSIS — I63.9 CEREBRAL INFARCTION, UNSPECIFIED (MULTI): ICD-10-CM

## 2024-06-26 DIAGNOSIS — D64.9 ANEMIA, UNSPECIFIED: ICD-10-CM

## 2024-06-27 RX ORDER — CLOPIDOGREL BISULFATE 75 MG/1
75 TABLET ORAL DAILY
Qty: 90 TABLET | Refills: 0 | Status: SHIPPED | OUTPATIENT
Start: 2024-06-27

## 2024-06-27 RX ORDER — FERROUS SULFATE 325(65) MG
1 TABLET ORAL DAILY
Qty: 90 TABLET | Refills: 0 | Status: SHIPPED | OUTPATIENT
Start: 2024-06-27

## 2024-07-09 ENCOUNTER — APPOINTMENT (OUTPATIENT)
Dept: PODIATRY | Facility: CLINIC | Age: 89
End: 2024-07-09
Payer: COMMERCIAL

## 2024-07-09 DIAGNOSIS — M79.672 FOOT PAIN, BILATERAL: ICD-10-CM

## 2024-07-09 DIAGNOSIS — R53.83 FATIGUE, UNSPECIFIED TYPE: ICD-10-CM

## 2024-07-09 DIAGNOSIS — L60.2 OVERGROWN TOENAILS: ICD-10-CM

## 2024-07-09 DIAGNOSIS — M79.671 FOOT PAIN, BILATERAL: ICD-10-CM

## 2024-07-09 DIAGNOSIS — E13.42 DIABETIC POLYNEUROPATHY ASSOCIATED WITH OTHER SPECIFIED DIABETES MELLITUS (MULTI): ICD-10-CM

## 2024-07-09 DIAGNOSIS — E11.9 ENCOUNTER FOR DIABETIC FOOT EXAM (MULTI): ICD-10-CM

## 2024-07-09 DIAGNOSIS — I11.9 HYPERTENSIVE HEART DISEASE WITHOUT HEART FAILURE: ICD-10-CM

## 2024-07-09 DIAGNOSIS — E11.9 TYPE 2 DIABETES MELLITUS WITHOUT COMPLICATION, WITHOUT LONG-TERM CURRENT USE OF INSULIN (MULTI): Primary | ICD-10-CM

## 2024-07-09 PROCEDURE — 1157F ADVNC CARE PLAN IN RCRD: CPT | Performed by: PODIATRIST

## 2024-07-09 PROCEDURE — 1160F RVW MEDS BY RX/DR IN RCRD: CPT | Performed by: PODIATRIST

## 2024-07-09 PROCEDURE — 1036F TOBACCO NON-USER: CPT | Performed by: PODIATRIST

## 2024-07-09 PROCEDURE — 99203 OFFICE O/P NEW LOW 30 MIN: CPT | Performed by: PODIATRIST

## 2024-07-09 PROCEDURE — 1159F MED LIST DOCD IN RCRD: CPT | Performed by: PODIATRIST

## 2024-07-09 RX ORDER — METOPROLOL TARTRATE 25 MG/1
12.5 TABLET, FILM COATED ORAL EVERY 12 HOURS SCHEDULED
Qty: 90 TABLET | Refills: 0 | Status: SHIPPED | OUTPATIENT
Start: 2024-07-09

## 2024-07-09 RX ORDER — LANOLIN ALCOHOL/MO/W.PET/CERES
1000 CREAM (GRAM) TOPICAL DAILY
Qty: 90 TABLET | Refills: 1 | Status: SHIPPED | OUTPATIENT
Start: 2024-07-09 | End: 2024-07-11

## 2024-07-09 RX ORDER — ISOSORBIDE MONONITRATE 30 MG/1
30 TABLET, EXTENDED RELEASE ORAL DAILY
Qty: 90 TABLET | Refills: 1 | Status: SHIPPED | OUTPATIENT
Start: 2024-07-09 | End: 2025-01-05

## 2024-07-09 NOTE — PROGRESS NOTES
This is a 88 y.o. female new patient for DM exam    History of Present Illness:   Patient states they are here for Dm exam  Admits to NTB to feet  Most recent A1C is 6.6  Unable to safely trim nails on own      Past Medical History  Past Medical History:   Diagnosis Date    BMI 21.0-21.9, adult     Chronic kidney disease, stage 3b (Multi) 08/22/2022    Stage 3b chronic kidney disease    Other specified diabetes mellitus with hyperosmolarity without nonketotic hyperglycemic-hyperosmolar coma ([facility]) (Multi)     Diabetes mellitus of other type with hyperosmolarity, with long-term current use of insulin    Personal history of other diseases of the digestive system     History of diverticulitis    Personal history of other diseases of the musculoskeletal system and connective tissue     History of gout    Personal history of other endocrine, nutritional and metabolic disease     History of hypercholesterolemia    Personal history of other endocrine, nutritional and metabolic disease 02/28/2019    History of type 2 diabetes mellitus    Personal history of other mental and behavioral disorders 08/09/2018    History of depression    Personal history of other specified conditions 09/22/2022    History of chronic fatigue    Soft tissue disorder, unspecified 10/02/2018    Soft tissue lesion of elbow region       Medications and Allergies have been reviewed.    Review Of Systems:  GENERAL: No weight loss, malaise or fevers.  HEENT: Negative for frequent or significant headaches,   RESPIRATORY: Negative for cough, wheezing or shortness of breath.  CARDIOVASCULAR: Negative for chest pain, leg swelling or palpitations.    Physical Exam:  Patient is a pleasant, cooperative, well developed 88 y.o.  adult female. The patient is alert and oriented to time, place and person.   Patient has normal affect and mood.    Examination of Both Lower Extremities:   Objective:   Vasc: DP and PT pulses are palpable bilateral.  CFT is less than 3  seconds bilateral.  Skin temperature is warm to cool proximal to distal bilateral.  Varicosities noted.     Neuro: Vibratory, light touch and proprioception are intact bilateral.   Admits to NTB to bl feet    Derm: Nails 1-5 bilateral are intact, thick and discolored.  Skin is supple with normal texture and turgor noted.  Webspaces are clean, dry and intact bilateral.  There are no hyperkeratoses, ulcerations, verruca or other lesions noted.      Ortho: Muscle strength is 5/5 for all pedal groups tested.   1. Type 2 diabetes mellitus without complication, without long-term current use of insulin (Multi)  Diabetic Shoe For Density      2. Overgrown toenails  Referral to Podiatry      3. Foot pain, bilateral  Diabetic Shoe For Density      4. Encounter for diabetic foot exam (Multi)  Diabetic Shoe For Density      5. Diabetic polyneuropathy associated with other specified diabetes mellitus (Multi)  Diabetic Shoe For Density        Patient educated on proper diabetic foot care.  Nails 1-5 b/l were debrided in thickness and length with nail cutting forceps.  A1C revd. 6.6  Gave rx for dm shoes and inserts  Patient to follow up in 6 mos or sooner if any problems arise.   Patient was in agreement to this plan. All questions answered.      Abida Dunne DPM  [phone]  [facility]  Fax: [phone]

## 2024-07-11 RX ORDER — LANOLIN ALCOHOL/MO/W.PET/CERES
1000 CREAM (GRAM) TOPICAL DAILY
Qty: 90 TABLET | Refills: 1 | Status: SHIPPED | OUTPATIENT
Start: 2024-07-11

## 2024-07-22 DIAGNOSIS — G45.9 TIA (TRANSIENT ISCHEMIC ATTACK): ICD-10-CM

## 2024-07-22 DIAGNOSIS — E78.2 MIXED HYPERLIPIDEMIA: ICD-10-CM

## 2024-07-22 DIAGNOSIS — E03.9 HYPOTHYROIDISM, UNSPECIFIED TYPE: ICD-10-CM

## 2024-07-22 DIAGNOSIS — I11.9 HYPERTENSIVE HEART DISEASE WITHOUT HEART FAILURE: ICD-10-CM

## 2024-07-23 RX ORDER — LEVOTHYROXINE SODIUM 100 UG/1
100 TABLET ORAL
Qty: 90 TABLET | Refills: 1 | Status: SHIPPED | OUTPATIENT
Start: 2024-07-23

## 2024-07-23 RX ORDER — ISOSORBIDE MONONITRATE 30 MG/1
30 TABLET, EXTENDED RELEASE ORAL DAILY
Qty: 90 TABLET | Refills: 1 | Status: SHIPPED | OUTPATIENT
Start: 2024-07-23 | End: 2025-01-19

## 2024-07-23 RX ORDER — ATORVASTATIN CALCIUM 40 MG/1
40 TABLET, FILM COATED ORAL DAILY
Qty: 90 TABLET | Refills: 1 | Status: SHIPPED | OUTPATIENT
Start: 2024-07-23 | End: 2025-01-19

## 2024-07-23 RX ORDER — FENOFIBRATE 54 MG/1
54 TABLET ORAL DAILY
Qty: 90 TABLET | Refills: 1 | Status: SHIPPED | OUTPATIENT
Start: 2024-07-23

## 2024-07-23 RX ORDER — AMLODIPINE BESYLATE 2.5 MG/1
2.5 TABLET ORAL DAILY
Qty: 90 TABLET | Refills: 1 | Status: SHIPPED | OUTPATIENT
Start: 2024-07-23

## 2024-07-29 ENCOUNTER — APPOINTMENT (OUTPATIENT)
Dept: RADIOLOGY | Facility: HOSPITAL | Age: 89
End: 2024-07-29
Payer: MEDICARE

## 2024-07-29 ENCOUNTER — HOSPITAL ENCOUNTER (INPATIENT)
Facility: HOSPITAL | Age: 89
LOS: 1 days | Discharge: HOME | End: 2024-07-30
Attending: EMERGENCY MEDICINE | Admitting: STUDENT IN AN ORGANIZED HEALTH CARE EDUCATION/TRAINING PROGRAM
Payer: MEDICARE

## 2024-07-29 DIAGNOSIS — L03.311 CELLULITIS OF ABDOMINAL WALL: Primary | ICD-10-CM

## 2024-07-29 DIAGNOSIS — R10.9 ACUTE ABDOMINAL PAIN: ICD-10-CM

## 2024-07-29 LAB
ALBUMIN SERPL BCP-MCNC: 3.9 G/DL (ref 3.4–5)
ALP SERPL-CCNC: 54 U/L (ref 33–136)
ALT SERPL W P-5'-P-CCNC: 12 U/L (ref 7–45)
ANION GAP SERPL CALC-SCNC: 13 MMOL/L (ref 10–20)
APPEARANCE UR: CLEAR
AST SERPL W P-5'-P-CCNC: 13 U/L (ref 9–39)
BASOPHILS # BLD AUTO: 0.06 X10*3/UL (ref 0–0.1)
BASOPHILS NFR BLD AUTO: 0.4 %
BILIRUB SERPL-MCNC: 0.9 MG/DL (ref 0–1.2)
BILIRUB UR STRIP.AUTO-MCNC: NEGATIVE MG/DL
BUN SERPL-MCNC: 26 MG/DL (ref 6–23)
CALCIUM SERPL-MCNC: 9.4 MG/DL (ref 8.6–10.3)
CHLORIDE SERPL-SCNC: 107 MMOL/L (ref 98–107)
CO2 SERPL-SCNC: 21 MMOL/L (ref 21–32)
COLOR UR: YELLOW
CREAT SERPL-MCNC: 1.76 MG/DL (ref 0.5–1.05)
EGFRCR SERPLBLD CKD-EPI 2021: 28 ML/MIN/1.73M*2
EOSINOPHIL # BLD AUTO: 0.21 X10*3/UL (ref 0–0.4)
EOSINOPHIL NFR BLD AUTO: 1.5 %
ERYTHROCYTE [DISTWIDTH] IN BLOOD BY AUTOMATED COUNT: 14.5 % (ref 11.5–14.5)
GLUCOSE BLD MANUAL STRIP-MCNC: 90 MG/DL (ref 74–99)
GLUCOSE BLD MANUAL STRIP-MCNC: 99 MG/DL (ref 74–99)
GLUCOSE SERPL-MCNC: 69 MG/DL (ref 74–99)
GLUCOSE UR STRIP.AUTO-MCNC: NEGATIVE MG/DL
HCT VFR BLD AUTO: 29.7 % (ref 36–46)
HEMOCCULT SP1 STL QL: NEGATIVE
HGB BLD-MCNC: 9.8 G/DL (ref 12–16)
IMM GRANULOCYTES # BLD AUTO: 0.16 X10*3/UL (ref 0–0.5)
IMM GRANULOCYTES NFR BLD AUTO: 1.1 % (ref 0–0.9)
KETONES UR STRIP.AUTO-MCNC: NEGATIVE MG/DL
LACTATE SERPL-SCNC: 2.5 MMOL/L (ref 0.4–2)
LEUKOCYTE ESTERASE UR QL STRIP.AUTO: NEGATIVE
LIPASE SERPL-CCNC: 16 U/L (ref 9–82)
LYMPHOCYTES # BLD AUTO: 3.45 X10*3/UL (ref 0.8–3)
LYMPHOCYTES NFR BLD AUTO: 24.1 %
MCH RBC QN AUTO: 30.2 PG (ref 26–34)
MCHC RBC AUTO-ENTMCNC: 33 G/DL (ref 32–36)
MCV RBC AUTO: 91 FL (ref 80–100)
MONOCYTES # BLD AUTO: 0.97 X10*3/UL (ref 0.05–0.8)
MONOCYTES NFR BLD AUTO: 6.8 %
NEUTROPHILS # BLD AUTO: 9.48 X10*3/UL (ref 1.6–5.5)
NEUTROPHILS NFR BLD AUTO: 66.1 %
NITRITE UR QL STRIP.AUTO: NEGATIVE
NRBC BLD-RTO: 0 /100 WBCS (ref 0–0)
PH UR STRIP.AUTO: 7 [PH]
PLATELET # BLD AUTO: 234 X10*3/UL (ref 150–450)
POTASSIUM SERPL-SCNC: 4.3 MMOL/L (ref 3.5–5.3)
PROT SERPL-MCNC: 6.1 G/DL (ref 6.4–8.2)
PROT UR STRIP.AUTO-MCNC: NEGATIVE MG/DL
RBC # BLD AUTO: 3.25 X10*6/UL (ref 4–5.2)
RBC # UR STRIP.AUTO: NEGATIVE /UL
SODIUM SERPL-SCNC: 137 MMOL/L (ref 136–145)
SP GR UR STRIP.AUTO: 1.01
UROBILINOGEN UR STRIP.AUTO-MCNC: <2 MG/DL
WBC # BLD AUTO: 14.3 X10*3/UL (ref 4.4–11.3)

## 2024-07-29 PROCEDURE — 36415 COLL VENOUS BLD VENIPUNCTURE: CPT | Performed by: EMERGENCY MEDICINE

## 2024-07-29 PROCEDURE — 82270 OCCULT BLOOD FECES: CPT | Performed by: EMERGENCY MEDICINE

## 2024-07-29 PROCEDURE — 2500000001 HC RX 250 WO HCPCS SELF ADMINISTERED DRUGS (ALT 637 FOR MEDICARE OP): Mod: IPSPLIT | Performed by: STUDENT IN AN ORGANIZED HEALTH CARE EDUCATION/TRAINING PROGRAM

## 2024-07-29 PROCEDURE — 96360 HYDRATION IV INFUSION INIT: CPT

## 2024-07-29 PROCEDURE — 1100000001 HC PRIVATE ROOM DAILY: Mod: IPSPLIT

## 2024-07-29 PROCEDURE — 74176 CT ABD & PELVIS W/O CONTRAST: CPT | Performed by: STUDENT IN AN ORGANIZED HEALTH CARE EDUCATION/TRAINING PROGRAM

## 2024-07-29 PROCEDURE — 2500000004 HC RX 250 GENERAL PHARMACY W/ HCPCS (ALT 636 FOR OP/ED): Mod: IPSPLIT

## 2024-07-29 PROCEDURE — 94760 N-INVAS EAR/PLS OXIMETRY 1: CPT | Mod: IPSPLIT

## 2024-07-29 PROCEDURE — 87040 BLOOD CULTURE FOR BACTERIA: CPT | Mod: CONLAB | Performed by: EMERGENCY MEDICINE

## 2024-07-29 PROCEDURE — 81003 URINALYSIS AUTO W/O SCOPE: CPT | Mod: IPSPLIT | Performed by: EMERGENCY MEDICINE

## 2024-07-29 PROCEDURE — 2500000004 HC RX 250 GENERAL PHARMACY W/ HCPCS (ALT 636 FOR OP/ED): Performed by: EMERGENCY MEDICINE

## 2024-07-29 PROCEDURE — 82947 ASSAY GLUCOSE BLOOD QUANT: CPT | Mod: IPSPLIT

## 2024-07-29 PROCEDURE — 2500000004 HC RX 250 GENERAL PHARMACY W/ HCPCS (ALT 636 FOR OP/ED): Mod: IPSPLIT | Performed by: STUDENT IN AN ORGANIZED HEALTH CARE EDUCATION/TRAINING PROGRAM

## 2024-07-29 PROCEDURE — 74176 CT ABD & PELVIS W/O CONTRAST: CPT

## 2024-07-29 PROCEDURE — 83605 ASSAY OF LACTIC ACID: CPT | Performed by: EMERGENCY MEDICINE

## 2024-07-29 PROCEDURE — 83690 ASSAY OF LIPASE: CPT | Performed by: EMERGENCY MEDICINE

## 2024-07-29 PROCEDURE — 80053 COMPREHEN METABOLIC PANEL: CPT | Performed by: EMERGENCY MEDICINE

## 2024-07-29 PROCEDURE — 99285 EMERGENCY DEPT VISIT HI MDM: CPT

## 2024-07-29 PROCEDURE — 85025 COMPLETE CBC W/AUTO DIFF WBC: CPT | Performed by: EMERGENCY MEDICINE

## 2024-07-29 PROCEDURE — 87086 URINE CULTURE/COLONY COUNT: CPT | Mod: CONLAB | Performed by: STUDENT IN AN ORGANIZED HEALTH CARE EDUCATION/TRAINING PROGRAM

## 2024-07-29 RX ORDER — SODIUM CHLORIDE 9 MG/ML
75 INJECTION, SOLUTION INTRAVENOUS CONTINUOUS
Status: DISCONTINUED | OUTPATIENT
Start: 2024-07-29 | End: 2024-07-30 | Stop reason: HOSPADM

## 2024-07-29 RX ORDER — ATORVASTATIN CALCIUM 40 MG/1
40 TABLET, FILM COATED ORAL NIGHTLY
Status: DISCONTINUED | OUTPATIENT
Start: 2024-07-29 | End: 2024-07-30 | Stop reason: HOSPADM

## 2024-07-29 RX ORDER — ACETAMINOPHEN 325 MG/1
650 TABLET ORAL EVERY 4 HOURS PRN
Status: DISCONTINUED | OUTPATIENT
Start: 2024-07-29 | End: 2024-07-30 | Stop reason: HOSPADM

## 2024-07-29 RX ORDER — AMLODIPINE BESYLATE 2.5 MG/1
2.5 TABLET ORAL DAILY
Status: DISCONTINUED | OUTPATIENT
Start: 2024-07-29 | End: 2024-07-30 | Stop reason: HOSPADM

## 2024-07-29 RX ORDER — BUPROPION HYDROCHLORIDE 150 MG/1
150 TABLET ORAL EVERY MORNING
Status: DISCONTINUED | OUTPATIENT
Start: 2024-07-30 | End: 2024-07-30 | Stop reason: HOSPADM

## 2024-07-29 RX ORDER — SODIUM CHLORIDE 9 MG/ML
INJECTION, SOLUTION INTRAVENOUS
Status: COMPLETED
Start: 2024-07-29 | End: 2024-07-29

## 2024-07-29 RX ORDER — DEXTROSE 50 % IN WATER (D50W) INTRAVENOUS SYRINGE
25
Status: DISCONTINUED | OUTPATIENT
Start: 2024-07-29 | End: 2024-07-30 | Stop reason: HOSPADM

## 2024-07-29 RX ORDER — CEFEPIME 1 G/50ML
1 INJECTION, SOLUTION INTRAVENOUS EVERY 12 HOURS
Status: DISCONTINUED | OUTPATIENT
Start: 2024-07-29 | End: 2024-07-30

## 2024-07-29 RX ORDER — LANOLIN ALCOHOL/MO/W.PET/CERES
1000 CREAM (GRAM) TOPICAL DAILY
Status: DISCONTINUED | OUTPATIENT
Start: 2024-07-29 | End: 2024-07-30 | Stop reason: HOSPADM

## 2024-07-29 RX ORDER — INSULIN LISPRO 100 [IU]/ML
0-15 INJECTION, SOLUTION INTRAVENOUS; SUBCUTANEOUS
Status: DISCONTINUED | OUTPATIENT
Start: 2024-07-29 | End: 2024-07-30 | Stop reason: HOSPADM

## 2024-07-29 RX ORDER — DEXTROSE 50 % IN WATER (D50W) INTRAVENOUS SYRINGE
12.5
Status: DISCONTINUED | OUTPATIENT
Start: 2024-07-29 | End: 2024-07-30 | Stop reason: HOSPADM

## 2024-07-29 RX ORDER — METOPROLOL TARTRATE 25 MG/1
12.5 TABLET, FILM COATED ORAL EVERY 12 HOURS SCHEDULED
Status: DISCONTINUED | OUTPATIENT
Start: 2024-07-29 | End: 2024-07-30 | Stop reason: HOSPADM

## 2024-07-29 RX ORDER — SODIUM CHLORIDE 9 MG/ML
100 INJECTION, SOLUTION INTRAVENOUS CONTINUOUS
Status: DISCONTINUED | OUTPATIENT
Start: 2024-07-29 | End: 2024-07-29

## 2024-07-29 RX ORDER — ISOSORBIDE MONONITRATE 30 MG/1
30 TABLET, EXTENDED RELEASE ORAL DAILY
Status: DISCONTINUED | OUTPATIENT
Start: 2024-07-29 | End: 2024-07-30 | Stop reason: HOSPADM

## 2024-07-29 RX ORDER — LEVOTHYROXINE SODIUM 100 UG/1
100 TABLET ORAL
Status: DISCONTINUED | OUTPATIENT
Start: 2024-07-30 | End: 2024-07-30 | Stop reason: HOSPADM

## 2024-07-29 RX ORDER — LATANOPROST 50 UG/ML
1 SOLUTION/ DROPS OPHTHALMIC NIGHTLY
Status: DISCONTINUED | OUTPATIENT
Start: 2024-07-29 | End: 2024-07-30 | Stop reason: HOSPADM

## 2024-07-29 RX ORDER — ONDANSETRON HYDROCHLORIDE 2 MG/ML
4 INJECTION, SOLUTION INTRAVENOUS EVERY 8 HOURS PRN
Status: DISCONTINUED | OUTPATIENT
Start: 2024-07-29 | End: 2024-07-30 | Stop reason: HOSPADM

## 2024-07-29 SDOH — SOCIAL STABILITY: SOCIAL INSECURITY: DOES ANYONE TRY TO KEEP YOU FROM HAVING/CONTACTING OTHER FRIENDS OR DOING THINGS OUTSIDE YOUR HOME?: NO

## 2024-07-29 SDOH — SOCIAL STABILITY: SOCIAL INSECURITY: DO YOU FEEL ANYONE HAS EXPLOITED OR TAKEN ADVANTAGE OF YOU FINANCIALLY OR OF YOUR PERSONAL PROPERTY?: NO

## 2024-07-29 SDOH — SOCIAL STABILITY: SOCIAL INSECURITY: ARE THERE ANY APPARENT SIGNS OF INJURIES/BEHAVIORS THAT COULD BE RELATED TO ABUSE/NEGLECT?: NO

## 2024-07-29 SDOH — SOCIAL STABILITY: SOCIAL INSECURITY: HAVE YOU HAD ANY THOUGHTS OF HARMING ANYONE ELSE?: NO

## 2024-07-29 SDOH — SOCIAL STABILITY: SOCIAL INSECURITY: ABUSE: ADULT

## 2024-07-29 SDOH — SOCIAL STABILITY: SOCIAL INSECURITY: ARE YOU OR HAVE YOU BEEN THREATENED OR ABUSED PHYSICALLY, EMOTIONALLY, OR SEXUALLY BY ANYONE?: NO

## 2024-07-29 SDOH — SOCIAL STABILITY: SOCIAL INSECURITY: HAS ANYONE EVER THREATENED TO HURT YOUR FAMILY OR YOUR PETS?: NO

## 2024-07-29 SDOH — SOCIAL STABILITY: SOCIAL INSECURITY: DO YOU FEEL UNSAFE GOING BACK TO THE PLACE WHERE YOU ARE LIVING?: NO

## 2024-07-29 SDOH — SOCIAL STABILITY: SOCIAL INSECURITY: HAVE YOU HAD THOUGHTS OF HARMING ANYONE ELSE?: NO

## 2024-07-29 SDOH — SOCIAL STABILITY: SOCIAL INSECURITY: WERE YOU ABLE TO COMPLETE ALL THE BEHAVIORAL HEALTH SCREENINGS?: YES

## 2024-07-29 ASSESSMENT — COGNITIVE AND FUNCTIONAL STATUS - GENERAL
CLIMB 3 TO 5 STEPS WITH RAILING: A LOT
MOBILITY SCORE: 19
PATIENT BASELINE BEDBOUND: NO
WALKING IN HOSPITAL ROOM: A LITTLE
STANDING UP FROM CHAIR USING ARMS: A LITTLE
DAILY ACTIVITIY SCORE: 24
STANDING UP FROM CHAIR USING ARMS: A LITTLE
CLIMB 3 TO 5 STEPS WITH RAILING: A LOT
DAILY ACTIVITIY SCORE: 24
MOVING TO AND FROM BED TO CHAIR: A LITTLE
WALKING IN HOSPITAL ROOM: A LITTLE
MOVING TO AND FROM BED TO CHAIR: A LITTLE
MOBILITY SCORE: 19

## 2024-07-29 ASSESSMENT — PATIENT HEALTH QUESTIONNAIRE - PHQ9
1. LITTLE INTEREST OR PLEASURE IN DOING THINGS: NOT AT ALL
2. FEELING DOWN, DEPRESSED OR HOPELESS: NOT AT ALL
SUM OF ALL RESPONSES TO PHQ9 QUESTIONS 1 & 2: 0

## 2024-07-29 ASSESSMENT — COLUMBIA-SUICIDE SEVERITY RATING SCALE - C-SSRS
1. IN THE PAST MONTH, HAVE YOU WISHED YOU WERE DEAD OR WISHED YOU COULD GO TO SLEEP AND NOT WAKE UP?: NO
2. HAVE YOU ACTUALLY HAD ANY THOUGHTS OF KILLING YOURSELF?: NO
6. HAVE YOU EVER DONE ANYTHING, STARTED TO DO ANYTHING, OR PREPARED TO DO ANYTHING TO END YOUR LIFE?: NO

## 2024-07-29 ASSESSMENT — LIFESTYLE VARIABLES
HOW OFTEN DO YOU HAVE 6 OR MORE DRINKS ON ONE OCCASION: NEVER
HOW MANY STANDARD DRINKS CONTAINING ALCOHOL DO YOU HAVE ON A TYPICAL DAY: PATIENT DOES NOT DRINK
AUDIT-C TOTAL SCORE: 0
AUDIT-C TOTAL SCORE: 0
HOW OFTEN DO YOU HAVE A DRINK CONTAINING ALCOHOL: NEVER
SKIP TO QUESTIONS 9-10: 1

## 2024-07-29 ASSESSMENT — ACTIVITIES OF DAILY LIVING (ADL)
PATIENT'S MEMORY ADEQUATE TO SAFELY COMPLETE DAILY ACTIVITIES?: YES
LACK_OF_TRANSPORTATION: NO
WALKS IN HOME: NEEDS ASSISTANCE
ASSISTIVE_DEVICE: WALKER;EYEGLASSES
FEEDING YOURSELF: INDEPENDENT
GROOMING: INDEPENDENT
HEARING - LEFT EAR: DIFFICULTY WITH NOISE
JUDGMENT_ADEQUATE_SAFELY_COMPLETE_DAILY_ACTIVITIES: YES
DRESSING YOURSELF: NEEDS ASSISTANCE
ADEQUATE_TO_COMPLETE_ADL: YES
BATHING: NEEDS ASSISTANCE
HEARING - RIGHT EAR: DIFFICULTY WITH NOISE
TOILETING: NEEDS ASSISTANCE

## 2024-07-29 ASSESSMENT — PAIN - FUNCTIONAL ASSESSMENT
PAIN_FUNCTIONAL_ASSESSMENT: 0-10
PAIN_FUNCTIONAL_ASSESSMENT: 0-10

## 2024-07-29 ASSESSMENT — PAIN SCALES - GENERAL
PAINLEVEL_OUTOF10: 5 - MODERATE PAIN
PAINLEVEL_OUTOF10: 3

## 2024-07-29 ASSESSMENT — PAIN DESCRIPTION - PAIN TYPE: TYPE: ACUTE PAIN

## 2024-07-29 ASSESSMENT — PAIN DESCRIPTION - LOCATION: LOCATION: ABDOMEN

## 2024-07-29 NOTE — ED PROVIDER NOTES
[place]   ED  Provider Note  7/29/2024 12:43 PM  AC05/AC05      Chief Complaint   Patient presents with   • Black or Bloody Stool     Pt c/o black stools, pain at LLQ abdomin, reddend and raised area that is painful. Denies fever, chills, night sweats. Endorses sob with ambulation and less energy.        History of Present Illness:   Stella A Saturday is a 88 y.o. female presenting to the ED for tarry stools and left lower quadrant abdominal pain and swelling and erythema, beginning a few days ago.  The complaint has been persistent, moderate in severity, and worsened by nothing.  Patient was initially seen at the urgent care center and sent to the ED for further evaluation.  She denies any fever or chills.  She does feel fatigued and tired.  She been having several dark black stools per day.  She is not taking Pepto-Bismol.  Space she is taking iron.  She denies chest pain or shortness of breath.  She has redness pain and swelling to the left lower quadrant abdominal wall.      Review of Systems:   Pertinent positives and review of systems as noted above.  Remaining 10 review of systems is negative or noncontributory to today's episode of care.  Review of Systems       --------------------------------------------- PAST HISTORY ---------------------------------------------  Past Medical History:   Past Medical History:   Diagnosis Date   • BMI 21.0-21.9, adult    • Chronic kidney disease, stage 3b (Multi) 08/22/2022    Stage 3b chronic kidney disease   • Other specified diabetes mellitus with hyperosmolarity without nonketotic hyperglycemic-hyperosmolar coma (NKHHC) (Multi)     Diabetes mellitus of other type with hyperosmolarity, with long-term current use of insulin   • Personal history of other diseases of the digestive system     History of diverticulitis   • Personal history of other diseases of the musculoskeletal system and connective tissue     History of gout   • Personal history of other endocrine,  nutritional and metabolic disease     History of hypercholesterolemia   • Personal history of other endocrine, nutritional and metabolic disease 02/28/2019    History of type 2 diabetes mellitus   • Personal history of other mental and behavioral disorders 08/09/2018    History of depression   • Personal history of other specified conditions 09/22/2022    History of chronic fatigue   • Soft tissue disorder, unspecified 10/02/2018    Soft tissue lesion of elbow region        Past Surgical History:   Past Surgical History:   Procedure Laterality Date   • EYE SURGERY  08/28/2017    Eye Surgery   • FOOT SURGERY  08/28/2017    Foot Surgery   • GALLBLADDER SURGERY  08/28/2017    Gallbladder Surgery   • TONSILLECTOMY  10/11/2017    Tonsillectomy   • TOTAL THYROIDECTOMY  10/11/2017    Thyroid Surgery Total Thyroidectomy        Social History:   Social History     Social History Narrative   • Not on file        Family History: family history includes Diabetes in her brother and mother; No Known Problems in her father. Unless otherwise noted, family history is non contributory    Patient's Medications   New Prescriptions    No medications on file   Previous Medications    ACETAMINOPHEN (TYLENOL) 325 MG TABLET    Take by mouth.    AMLODIPINE (NORVASC) 2.5 MG TABLET    Take 1 tablet (2.5 mg) by mouth once daily.    ATORVASTATIN (LIPITOR) 40 MG TABLET    Take 1 tablet (40 mg) by mouth once daily.    AUTOLET (ACCU-CHEK FASTCLIX LANCING DEV) LANCING DEVICE    Use as instructed    BLOOD SUGAR DIAGNOSTIC (TRUE METRIX GLUCOSE TEST STRIP) STRIP    1 strip 3 times a day.    BUPROPION XL (WELLBUTRIN XL) 150 MG 24 HR TABLET    Take 1 tablet (150 mg) by mouth once daily in the morning. Do not crush, chew, or split.    CLOPIDOGREL (PLAVIX) 75 MG TABLET    Take 1 tablet (75 mg) by mouth once daily.    CYANOCOBALAMIN (VITAMIN B-12) 1,000 MCG TABLET    Take 1 tablet (1,000 mcg) by mouth once daily.    DOCUSATE SODIUM (COLACE) 100 MG CAPSULE    " Take by mouth.    ERGOCALCIFEROL (VITAMIN D-2) 1.25 MG (61485 UT) CAPSULE    Take 1 capsule (1,250 mcg) by mouth 1 (one) time per week.    FENOFIBRATE (TRICOR) 54 MG TABLET    Take 1 tablet (54 mg) by mouth once daily.    FERROUS SULFATE, 325 MG FERROUS SULFATE, (FEROSUL) TABLET    Take 1 tablet by mouth once daily. as directed    FLUTICASONE (FLONASE) 50 MCG/ACTUATION NASAL SPRAY    Administer 2 sprays into each nostril once daily. Shake gently. Before first use, prime pump. After use, clean tip and replace cap. Do not start before February 16, 2024.    FREESTYLE GLUCOSE MONITORING (TRUE METRIX GLUCOSE METER) KIT    Use as directed    GLUCOSE 4 GRAM CHEWABLE TABLET    8 g AS DIRECTED (route: BY MOUTH)    INSULIN DEGLUDEC (TRESIBA FLEXTOUCH U-100) 100 UNIT/ML (3 ML) INJECTION    Inject 14 Units under the skin once daily in the morning.    ISOSORBIDE MONONITRATE ER (IMDUR) 30 MG 24 HR TABLET    Take 1 tablet (30 mg) by mouth once daily. Do not crush or chew.    LATANOPROST (XALATAN) 0.005 % OPHTHALMIC SOLUTION    Administer 1 drop into both eyes once daily.    LEVOTHYROXINE (SYNTHROID, LEVOXYL) 100 MCG TABLET    Take 1 tablet (100 mcg) by mouth once daily in the morning. Take before meals.    LIDOCAINE (LIDODERM) 5 % PATCH    Apply topically.    METOPROLOL TARTRATE (LOPRESSOR) 25 MG TABLET    Take 0.5 tablets (12.5 mg) by mouth every 12 hours.    OMEGA 3-DHA-EPA-FISH OIL (FISH OIL) 1,000 MG (120 MG-180 MG) CAPSULE    Take by mouth.    PEN NEEDLE, DIABETIC 31 GAUGE X 5/16\" NEEDLE    Use 1 time daily with insulin pen    POTASSIUM CHLORIDE CR 20 MEQ ER TABLET    Take 1 tablet (20 mEq) by mouth once daily. Do not crush, chew, or split.    TIZANIDINE (ZANAFLEX) 2 MG TABLET    Take 1 tablet (2 mg) by mouth once daily.    TRIAMCINOLONE (KENALOG) 0.1 % CREAM    Apply 1 Application topically 2 times a day. For nostrils   Modified Medications    No medications on file   Discontinued Medications    No medications on file    "   The patient’s home medications have been reviewed.    Allergies: Metformin, Penicillins, Sulfa (sulfonamide antibiotics), and Allopurinol    -------------------------------------------------- RESULTS -------------------------------------------------  All laboratory and radiology results have been personally reviewed by myself   LABS:  Labs Reviewed   CBC WITH AUTO DIFFERENTIAL - Abnormal       Result Value    WBC 14.3 (*)     nRBC 0.0      RBC 3.25 (*)     Hemoglobin 9.8 (*)     Hematocrit 29.7 (*)     MCV 91      MCH 30.2      MCHC 33.0      RDW 14.5      Platelets 234      Neutrophils % 66.1      Immature Granulocytes %, Automated 1.1 (*)     Lymphocytes % 24.1      Monocytes % 6.8      Eosinophils % 1.5      Basophils % 0.4      Neutrophils Absolute 9.48 (*)     Immature Granulocytes Absolute, Automated 0.16      Lymphocytes Absolute 3.45 (*)     Monocytes Absolute 0.97 (*)     Eosinophils Absolute 0.21      Basophils Absolute 0.06     OCCULT BLOOD X1, STOOL - Normal    Occult Blood, Stool X1 Negative     URINE CULTURE   BLOOD CULTURE   BLOOD CULTURE   LACTATE   LIPASE   COMPREHENSIVE METABOLIC PANEL   URINALYSIS WITH REFLEX CULTURE AND MICROSCOPIC    Narrative:     The following orders were created for panel order Urinalysis with Reflex Culture and Microscopic.  Procedure                               Abnormality         Status                     ---------                               -----------         ------                     Urinalysis with Reflex C...[[id number]]                                                 Extra Urine Gray Tube[[id number]]                                                         Please view results for these tests on the individual orders.   URINALYSIS WITH REFLEX CULTURE AND MICROSCOPIC   EXTRA URINE GRAY TUBE         RADIOLOGY:  Interpreted by Radiologist.  CT abdomen pelvis wo IV contrast   Final Result   Left anterior lower abdominal wall cellulitis with underlying   subcutaneous  "induration without an organized abscess.        Acute/subacute sigmoid diverticulitis without evidence of bowel   perforation or intra-abdominal abscess. Consider further evaluation   with colonoscopy following resolution of acute inflammation.        MACRO:   None        Signed by: Ubaldo Seay 7/29/2024 1:54 PM   Dictation workstation:   [id number]          Encounter Date: 02/12/24   ECG 12 Lead   Result Value    Ventricular Rate 54    Atrial Rate 54    AR Interval 204    QRS Duration 84    QT Interval 440    QTC Calculation(Bazett) 417    P Axis 19    R Axis 26    T Axis 39    QRS Count 9    Q Onset 224    P Onset 122    P Offset 175    T Offset 444    QTC Fredericia 424    Narrative    Sinus bradycardia  Otherwise normal ECG  When compared with ECG of 05-AUG-2023 09:03,  Previous ECG has undetermined rhythm, needs review  QT has shortened  See ED provider note for full interpretation and clinical correlation  Confirmed by Diana Sanchez ([id number]) on 2/12/2024 6:47:20 PM     ------------------------- NURSING NOTES AND VITALS REVIEWED ---------------------------   The nursing notes within the ED encounter and vital signs as below have been reviewed.   BP 97/69   Pulse 57   Temp 36.5 °C (97.7 °F) (Oral)   Resp (!) 24   Ht 1.702 m (5' 7\")   Wt 69 kg (152 lb 1.9 oz)   SpO2 100%   BMI 23.82 kg/m²   Oxygen Saturation Interpretation: Normal      ---------------------------------------------------PHYSICAL EXAM--------------------------------------  Physical Exam   Constitutional/General: Alert,  well appearing, non toxic in NAD  Head: Normocephalic and atraumatic  Eyes: PERRL, EOMI, conjunctiva normal, sclera non icteric  Mouth: Oropharynx clear, handling secretions, no trismus, no asymmetry of the posterior oropharynx or uvular edema  Neck: Supple, full ROM, non tender to palpation in the midline, no stridor, no crepitus, no meningeal signs  Respiratory: Lungs clear to auscultation bilaterally, no wheezes, " rales, or rhonchi. Not in respiratory distress  Cardiovascular:  Regular rate. Regular rhythm. No murmurs, gallops, or rubs. 2+ distal pulses  Chest: No chest wall tenderness  GI:  Abdomen Soft, left lower quadrant tenderness non distended.  +BS. No organomegaly, no palpable masses,  No rebound, guarding, or rigidity.,  Black stool sent for stool guaiac  Musculoskeletal: Moves all extremities x 4. Warm and well perfused, no clubbing, cyanosis, or edema. Capillary refill <3 seconds  Integument: skin warm and dry. No rashes.  There is an indurated erythematous area in the left lower quadrant of the abdomen without drainage.  It feels to be discrete and located in the abdominal wall.  No obvious hernias noted.  Lymphatic: no lymphadenopathy noted  Neurologic: No focal deficits, symmetric strength 5/5 in the upper and lower extremities bilaterally  Psychiatric: Normal Affect    Procedures    ------------------------------ ED COURSE/MEDICAL DECISION MAKING----------------------  Diagnoses as of 07/29/24 1425   Cellulitis of abdominal wall      Patient has an abdominal wall cellulitis.  There is no evidence of abscess on the CT scan.  Her white count is 15,000.  Her lactate is 2.5.  She was given a bolus of IV fluid and IV vancomycin.  I have consulted Dr. Estrella and arranged for further inpatient antibiotic treatment.      Medical Decision Making:   Admit  Diagnoses as of 07/29/24 1425   Cellulitis of abdominal wall      Counseling:   The emergency provider has spoken with the patient and discussed today’s results, in addition to providing specific details for the plan of care and counseling regarding the diagnosis and prognosis.  Questions are answered at this time and they are agreeable with the plan.      --------------------------------- IMPRESSION AND DISPOSITION ---------------------------------        IMPRESSION  No diagnosis found.    DISPOSITION  Disposition: Admit to med/surg floor  Patient condition is  fair      Billing Provider Critical Care Time: 0 minutes     Sorin Santamaria MD  07/30/24 0748

## 2024-07-30 VITALS
WEIGHT: 129.63 LBS | OXYGEN SATURATION: 99 % | BODY MASS INDEX: 22.97 KG/M2 | HEIGHT: 63 IN | DIASTOLIC BLOOD PRESSURE: 54 MMHG | RESPIRATION RATE: 18 BRPM | SYSTOLIC BLOOD PRESSURE: 158 MMHG | TEMPERATURE: 97.3 F | HEART RATE: 56 BPM

## 2024-07-30 LAB
ALBUMIN SERPL BCP-MCNC: 3.3 G/DL (ref 3.4–5)
ANION GAP SERPL CALC-SCNC: 9 MMOL/L (ref 10–20)
BUN SERPL-MCNC: 17 MG/DL (ref 6–23)
CALCIUM SERPL-MCNC: 8.2 MG/DL (ref 8.6–10.3)
CHLORIDE SERPL-SCNC: 112 MMOL/L (ref 98–107)
CO2 SERPL-SCNC: 21 MMOL/L (ref 21–32)
CREAT SERPL-MCNC: 1.41 MG/DL (ref 0.5–1.05)
EGFRCR SERPLBLD CKD-EPI 2021: 36 ML/MIN/1.73M*2
ERYTHROCYTE [DISTWIDTH] IN BLOOD BY AUTOMATED COUNT: 14.5 % (ref 11.5–14.5)
GLUCOSE BLD MANUAL STRIP-MCNC: 108 MG/DL (ref 74–99)
GLUCOSE BLD MANUAL STRIP-MCNC: 112 MG/DL (ref 74–99)
GLUCOSE BLD MANUAL STRIP-MCNC: 92 MG/DL (ref 74–99)
GLUCOSE SERPL-MCNC: 83 MG/DL (ref 74–99)
HCT VFR BLD AUTO: 30.6 % (ref 36–46)
HGB BLD-MCNC: 9.4 G/DL (ref 12–16)
HOLD SPECIMEN: NORMAL
MAGNESIUM SERPL-MCNC: 1.79 MG/DL (ref 1.6–2.4)
MCH RBC QN AUTO: 29.9 PG (ref 26–34)
MCHC RBC AUTO-ENTMCNC: 30.7 G/DL (ref 32–36)
MCV RBC AUTO: 98 FL (ref 80–100)
NRBC BLD-RTO: 0 /100 WBCS (ref 0–0)
PHOSPHATE SERPL-MCNC: 3.8 MG/DL (ref 2.5–4.9)
PLATELET # BLD AUTO: 186 X10*3/UL (ref 150–450)
POTASSIUM SERPL-SCNC: 4 MMOL/L (ref 3.5–5.3)
RBC # BLD AUTO: 3.14 X10*6/UL (ref 4–5.2)
SODIUM SERPL-SCNC: 138 MMOL/L (ref 136–145)
WBC # BLD AUTO: 9.6 X10*3/UL (ref 4.4–11.3)

## 2024-07-30 PROCEDURE — 85027 COMPLETE CBC AUTOMATED: CPT | Mod: IPSPLIT | Performed by: STUDENT IN AN ORGANIZED HEALTH CARE EDUCATION/TRAINING PROGRAM

## 2024-07-30 PROCEDURE — 2500000004 HC RX 250 GENERAL PHARMACY W/ HCPCS (ALT 636 FOR OP/ED): Mod: JZ,IPSPLIT | Performed by: STUDENT IN AN ORGANIZED HEALTH CARE EDUCATION/TRAINING PROGRAM

## 2024-07-30 PROCEDURE — 94760 N-INVAS EAR/PLS OXIMETRY 1: CPT | Mod: IPSPLIT

## 2024-07-30 PROCEDURE — 82947 ASSAY GLUCOSE BLOOD QUANT: CPT | Mod: IPSPLIT

## 2024-07-30 PROCEDURE — 36415 COLL VENOUS BLD VENIPUNCTURE: CPT | Mod: IPSPLIT | Performed by: STUDENT IN AN ORGANIZED HEALTH CARE EDUCATION/TRAINING PROGRAM

## 2024-07-30 PROCEDURE — 99236 HOSP IP/OBS SAME DATE HI 85: CPT | Performed by: STUDENT IN AN ORGANIZED HEALTH CARE EDUCATION/TRAINING PROGRAM

## 2024-07-30 PROCEDURE — 2500000001 HC RX 250 WO HCPCS SELF ADMINISTERED DRUGS (ALT 637 FOR MEDICARE OP): Mod: IPSPLIT | Performed by: STUDENT IN AN ORGANIZED HEALTH CARE EDUCATION/TRAINING PROGRAM

## 2024-07-30 PROCEDURE — 83735 ASSAY OF MAGNESIUM: CPT | Mod: IPSPLIT | Performed by: STUDENT IN AN ORGANIZED HEALTH CARE EDUCATION/TRAINING PROGRAM

## 2024-07-30 PROCEDURE — 2500000004 HC RX 250 GENERAL PHARMACY W/ HCPCS (ALT 636 FOR OP/ED): Mod: IPSPLIT

## 2024-07-30 PROCEDURE — 80069 RENAL FUNCTION PANEL: CPT | Mod: IPSPLIT | Performed by: STUDENT IN AN ORGANIZED HEALTH CARE EDUCATION/TRAINING PROGRAM

## 2024-07-30 PROCEDURE — 99239 HOSP IP/OBS DSCHRG MGMT >30: CPT | Performed by: STUDENT IN AN ORGANIZED HEALTH CARE EDUCATION/TRAINING PROGRAM

## 2024-07-30 RX ORDER — CEFEPIME HYDROCHLORIDE 1 G/50ML
1 INJECTION, SOLUTION INTRAVENOUS EVERY 12 HOURS
Status: DISCONTINUED | OUTPATIENT
Start: 2024-07-30 | End: 2024-07-30 | Stop reason: HOSPADM

## 2024-07-30 RX ORDER — TRAMADOL HYDROCHLORIDE 50 MG/1
50 TABLET ORAL EVERY 12 HOURS PRN
Qty: 10 TABLET | Refills: 0 | Status: SHIPPED | OUTPATIENT
Start: 2024-07-30 | End: 2024-08-04

## 2024-07-30 RX ORDER — TRAMADOL HYDROCHLORIDE 50 MG/1
50 TABLET ORAL EVERY 12 HOURS PRN
Status: DISCONTINUED | OUTPATIENT
Start: 2024-07-30 | End: 2024-07-30 | Stop reason: HOSPADM

## 2024-07-30 RX ORDER — SODIUM CHLORIDE 9 MG/ML
INJECTION, SOLUTION INTRAVENOUS
Status: COMPLETED
Start: 2024-07-30 | End: 2024-07-30

## 2024-07-30 RX ORDER — CEFPODOXIME PROXETIL 200 MG/1
200 TABLET, FILM COATED ORAL 2 TIMES DAILY
Qty: 14 TABLET | Refills: 0 | Status: SHIPPED | OUTPATIENT
Start: 2024-07-30 | End: 2024-08-06

## 2024-07-30 ASSESSMENT — COGNITIVE AND FUNCTIONAL STATUS - GENERAL
MOVING TO AND FROM BED TO CHAIR: A LITTLE
DAILY ACTIVITIY SCORE: 24
STANDING UP FROM CHAIR USING ARMS: A LITTLE
WALKING IN HOSPITAL ROOM: A LITTLE
CLIMB 3 TO 5 STEPS WITH RAILING: A LOT
MOBILITY SCORE: 19

## 2024-07-30 ASSESSMENT — PAIN SCALES - GENERAL
PAINLEVEL_OUTOF10: 3
PAINLEVEL_OUTOF10: 0 - NO PAIN
PAINLEVEL_OUTOF10: 8

## 2024-07-30 NOTE — DISCHARGE SUMMARY
Discharge Diagnosis  Cellulitis of abdominal wall    Issues Requiring Follow-Up  None    Test Results Pending At Discharge  Pending Labs       Order Current Status    Urine Culture In process    Blood Culture Preliminary result    Blood Culture Preliminary result            Hospital Course  Stella A Saturday is a 88 year old female with PMHx significant for TIA, hypertension, hyperlipidemia, diabetes mellitus type 2, hypothyroidism, depression who was admitted for management of LLQ cellulitis. Patient responded well to IV cefepime and was transitioned to Vantin. Discharged home on 7/29/2024.    More than 35 minutes were spent in coordinating patient discharge.    Pertinent Physical Exam At Time of Discharge  Physical Exam  Vitals and nursing note reviewed.   Constitutional:       General: She is not in acute distress.  HENT:      Mouth/Throat:      Mouth: Mucous membranes are moist.   Eyes:      Extraocular Movements: Extraocular movements intact.      Conjunctiva/sclera: Conjunctivae normal.   Cardiovascular:      Rate and Rhythm: Normal rate and regular rhythm.      Pulses: Normal pulses.      Heart sounds: Normal heart sounds.   Pulmonary:      Effort: Pulmonary effort is normal.      Breath sounds: Normal breath sounds.   Abdominal:      General: Abdomen is flat. Bowel sounds are normal.      Palpations: Abdomen is soft.      Tenderness: There is no guarding.   Skin:     General: Skin is warm.      Capillary Refill: Capillary refill takes less than 2 seconds.      Findings: Erythema present.      Comments: LLQ circular erythema with TTP   Neurological:      Mental Status: She is alert. Mental status is at baseline.   Psychiatric:         Mood and Affect: Mood normal.         Behavior: Behavior normal.         Thought Content: Thought content normal.         Judgment: Judgment normal.         Home Medications     Medication List      START taking these medications     cefpodoxime 200 mg tablet; Commonly known as:  Vantin; Take 1 tablet (200   mg) by mouth 2 times a day for 7 days.   traMADol 50 mg tablet; Commonly known as: Ultram; Take 1 tablet (50 mg)   by mouth every 12 hours if needed for severe pain (7 - 10) for up to 5   days.     CONTINUE taking these medications     acetaminophen 325 mg tablet; Commonly known as: Tylenol   amLODIPine 2.5 mg tablet; Commonly known as: Norvasc; Take 1 tablet (2.5   mg) by mouth once daily.   atorvastatin 40 mg tablet; Commonly known as: Lipitor; Take 1 tablet (40   mg) by mouth once daily.   Autolet lancing device; Commonly known as: Accu-Chek FastClix Lancing   Dev; Use as instructed   Blood glucose monitoring meter kit kit; Commonly known as: True Metrix   Glucose Meter; Use as directed   buPROPion  mg 24 hr tablet; Commonly known as: Wellbutrin XL; Take   1 tablet (150 mg) by mouth once daily in the morning. Do not crush, chew,   or split.   clopidogrel 75 mg tablet; Commonly known as: Plavix; Take 1 tablet (75   mg) by mouth once daily.   cyanocobalamin 1,000 mcg tablet; Commonly known as: Vitamin B-12; Take 1   tablet (1,000 mcg) by mouth once daily.   docusate sodium 100 mg capsule; Commonly known as: Colace   ergocalciferol 1.25 MG (26483 UT) capsule; Commonly known as: Vitamin   D-2; Take 1 capsule (1,250 mcg) by mouth 1 (one) time per week.   fenofibrate 54 mg tablet; Commonly known as: Tricor; Take 1 tablet (54   mg) by mouth once daily.   ferrous sulfate (325 mg ferrous sulfate) tablet; Commonly known as:   FeroSuL; Take 1 tablet by mouth once daily. as directed   Fish OiL 1,000 mg (120 mg-180 mg) capsule; Generic drug: omega   3-dha-epa-fish oil   fluticasone 50 mcg/actuation nasal spray; Commonly known as: Flonase;   Administer 2 sprays into each nostril once daily. Shake gently. Before   first use, prime pump. After use, clean tip and replace cap. Do not start   before February 16, 2024.   glucose 4 gram chewable tablet   insulin degludec 100 unit/mL (3 mL)  "injection; Commonly known as:   Tresiba FlexTouch U-100; Inject 14 Units under the skin once daily in the   morning.   isosorbide mononitrate ER 30 mg 24 hr tablet; Commonly known as: Imdur;   Take 1 tablet (30 mg) by mouth once daily. Do not crush or chew.   latanoprost 0.005 % ophthalmic solution; Commonly known as: Xalatan   levothyroxine 100 mcg tablet; Commonly known as: Synthroid, Levoxyl;   Take 1 tablet (100 mcg) by mouth once daily in the morning. Take before   meals.   Lidoderm 5 % patch; Generic drug: lidocaine   metoprolol tartrate 25 mg tablet; Commonly known as: Lopressor; Take 0.5   tablets (12.5 mg) by mouth every 12 hours.   pen needle, diabetic 31 gauge x 5/16\" needle   potassium chloride CR 20 mEq ER tablet; Commonly known as: Klor-Con M20;   Take 1 tablet (20 mEq) by mouth once daily. Do not crush, chew, or split.   tiZANidine 2 mg tablet; Commonly known as: Zanaflex; Take 1 tablet (2   mg) by mouth once daily.   triamcinolone 0.1 % cream; Commonly known as: Kenalog   True Metrix Glucose Test Strip strip; Generic drug: blood sugar   diagnostic; 1 strip 3 times a day.       Outpatient Follow-Up  Future Appointments   Date Time Provider Department Center   8/26/2024  3:30 PM Marika Peña MD [id number] [facility]   1/15/2025  4:00 PM Abida Dunne DPM [id number] [facility]       Bartolome Estrella DO  "

## 2024-07-30 NOTE — PROGRESS NOTES
07/30/24 1336   Discharge Planning   Living Arrangements Alone   Support Systems Family members   Assistance Needed independent, has walker and ramp to enter her home   Type of Residence Private residence   Number of Stairs to Enter Residence   (ramp)   Expected Discharge Disposition Home   Does the patient need discharge transport arranged? No     Pt lives alone and uses a walker for ambulation safety. She has a ramp to enter her home. Her family checks on her frequently, however, pt feels safe alone at home. She feels she has everything she needs to manage her health at home. RALF Kat

## 2024-07-30 NOTE — CARE PLAN
The clinical goals for the shift include Pt will tolerate IVF and ATB throughout the shift.    Over the shift, the patient did make progress toward the following goals. Barriers to progression include [place]. Pt resting quietly throughout the shift. VSS, RA. Continue IV NS @ 75ml/hr and IV ATB. Tylenol PRN given @ bedtime. NO sign of distress and bed alarm. Call light within reach.

## 2024-07-30 NOTE — CARE PLAN
Patient requesting to be discharged home, stating she is feeling better and her pain has decreased. Patient to be discharged home on oral antibiotics. Patient verbalized understanding of all discharge instructions. No needs or concerns at this time.

## 2024-07-31 ENCOUNTER — PATIENT OUTREACH (OUTPATIENT)
Dept: CARE COORDINATION | Facility: CLINIC | Age: 89
End: 2024-07-31
Payer: MEDICARE

## 2024-07-31 LAB — BACTERIA UR CULT: NORMAL

## 2024-08-01 NOTE — H&P
ANIBAL Douglas A Saturday is an 88 year old female with PMHx significant for TIA, hypertension, hyperlipidemia, diabetes mellitus type 2, hypothyroidism and depression who presented to [facility] ED with LLQ pain. Patient states she is unsure of how the lower left of her abdomen became red, swollen, and painful to the touch. She does not recall an injury to the area and has not had any bug bites as well. Patient states that the lower left side of the abdomen is causing her persistent dull pain, 7/10, with no radiation, minimally improved with Tylenol. Otherwise patient feels that she has noticed dark Bms but has not noticed blood when wiping. Denies headaches, vision changes, chest pain, SOB, N/V, fever, and chills.    12 Point ROS negative unless noted in above Hospitals in [place]    ED Course   Vitals - /54 (BP Location: Left arm, Patient Position: Lying)   Pulse 56   Temp 36.3 °C (97.3 °F) (Temporal)   Resp 18   Wt 58.8 kg (129 lb 10.1 oz)   SpO2 99%   Labs - No results found for this or any previous visit (from the past 24 hour(s)).  Interventions - Medications - No data to display    Past Medical History     Past Medical History:   Diagnosis Date    BMI 21.0-21.9, adult     Chronic kidney disease, stage 3b (Multi) 08/22/2022    Stage 3b chronic kidney disease    Other specified diabetes mellitus with hyperosmolarity without nonketotic hyperglycemic-hyperosmolar coma (NKHHC) (Multi)     Diabetes mellitus of other type with hyperosmolarity, with long-term current use of insulin    Personal history of other diseases of the digestive system     History of diverticulitis    Personal history of other diseases of the musculoskeletal system and connective tissue     History of gout    Personal history of other endocrine, nutritional and metabolic disease     History of hypercholesterolemia    Personal history of other endocrine, nutritional and metabolic disease 02/28/2019    History of type 2 diabetes mellitus    Personal  history of other mental and behavioral disorders 08/09/2018    History of depression    Personal history of other specified conditions 09/22/2022    History of chronic fatigue    Soft tissue disorder, unspecified 10/02/2018    Soft tissue lesion of elbow region        Surgical History     Past Surgical History:   Procedure Laterality Date    EYE SURGERY  08/28/2017    Eye Surgery    FOOT SURGERY  08/28/2017    Foot Surgery    GALLBLADDER SURGERY  08/28/2017    Gallbladder Surgery    TONSILLECTOMY  10/11/2017    Tonsillectomy    TOTAL THYROIDECTOMY  10/11/2017    Thyroid Surgery Total Thyroidectomy       Family History     Family History   Problem Relation Name Age of Onset    Diabetes Mother      No Known Problems Father      Diabetes Brother         Social History   She reports that she has never smoked. She has never used smokeless tobacco. She reports that she does not drink alcohol and does not use drugs.    Allergies   Metformin, Penicillins, Sulfa (sulfonamide antibiotics), and Allopurinol    Medications   No current facility-administered medications for this encounter.    Current Outpatient Medications:     acetaminophen (Tylenol) 325 mg tablet, Take by mouth., Disp: , Rfl:     amLODIPine (Norvasc) 2.5 mg tablet, Take 1 tablet (2.5 mg) by mouth once daily., Disp: 90 tablet, Rfl: 1    atorvastatin (Lipitor) 40 mg tablet, Take 1 tablet (40 mg) by mouth once daily., Disp: 90 tablet, Rfl: 1    Autolet (Accu-Chek FastClix Lancing Dev) lancing device, Use as instructed, Disp: 1 each, Rfl: 3    blood sugar diagnostic (True Metrix Glucose Test Strip) strip, 1 strip 3 times a day., Disp: 200 strip, Rfl: 3    buPROPion XL (Wellbutrin XL) 150 mg 24 hr tablet, Take 1 tablet (150 mg) by mouth once daily in the morning. Do not crush, chew, or split., Disp: 90 tablet, Rfl: 0    cefpodoxime (Vantin) 200 mg tablet, Take 1 tablet (200 mg) by mouth 2 times a day for 7 days., Disp: 14 tablet, Rfl: 0    clopidogrel (Plavix) 75 mg  tablet, Take 1 tablet (75 mg) by mouth once daily., Disp: 90 tablet, Rfl: 0    cyanocobalamin (Vitamin B-12) 1,000 mcg tablet, Take 1 tablet (1,000 mcg) by mouth once daily., Disp: 90 tablet, Rfl: 1    docusate sodium (Colace) 100 mg capsule, Take by mouth., Disp: , Rfl:     ergocalciferol (Vitamin D-2) 1.25 MG (62715 UT) capsule, Take 1 capsule (1,250 mcg) by mouth 1 (one) time per week., Disp: 12 capsule, Rfl: 0    fenofibrate (Tricor) 54 mg tablet, Take 1 tablet (54 mg) by mouth once daily., Disp: 90 tablet, Rfl: 1    ferrous sulfate, 325 mg ferrous sulfate, (FeroSuL) tablet, Take 1 tablet by mouth once daily. as directed, Disp: 90 tablet, Rfl: 0    fluticasone (Flonase) 50 mcg/actuation nasal spray, Administer 2 sprays into each nostril once daily. Shake gently. Before first use, prime pump. After use, clean tip and replace cap. Do not start before February 16, 2024., Disp: 16 g, Rfl: 12    FreeStyle glucose monitoring (True Metrix Glucose Meter) kit, Use as directed, Disp: 1 each, Rfl: 0    glucose 4 gram chewable tablet, 8 g AS DIRECTED (route: BY MOUTH), Disp: , Rfl:     insulin degludec (Tresiba FlexTouch U-100) 100 unit/mL (3 mL) injection, Inject 14 Units under the skin once daily in the morning., Disp: 12 mL, Rfl: 1    isosorbide mononitrate ER (Imdur) 30 mg 24 hr tablet, Take 1 tablet (30 mg) by mouth once daily. Do not crush or chew., Disp: 90 tablet, Rfl: 1    latanoprost (Xalatan) 0.005 % ophthalmic solution, Administer 1 drop into both eyes once daily., Disp: , Rfl:     levothyroxine (Synthroid, Levoxyl) 100 mcg tablet, Take 1 tablet (100 mcg) by mouth once daily in the morning. Take before meals., Disp: 90 tablet, Rfl: 1    lidocaine (Lidoderm) 5 % patch, Apply topically., Disp: , Rfl:     metoprolol tartrate (Lopressor) 25 mg tablet, Take 0.5 tablets (12.5 mg) by mouth every 12 hours., Disp: 90 tablet, Rfl: 0    omega 3-dha-epa-fish oil (Fish OiL) 1,000 mg (120 mg-180 mg) capsule, Take by mouth.,  "Disp: , Rfl:     pen needle, diabetic 31 gauge x 5/16\" needle, Use 1 time daily with insulin pen, Disp: , Rfl:     potassium chloride CR 20 mEq ER tablet, Take 1 tablet (20 mEq) by mouth once daily. Do not crush, chew, or split., Disp: 90 tablet, Rfl: 1    tiZANidine (Zanaflex) 2 mg tablet, Take 1 tablet (2 mg) by mouth once daily., Disp: 90 tablet, Rfl: 0    traMADol (Ultram) 50 mg tablet, Take 1 tablet (50 mg) by mouth every 12 hours if needed for severe pain (7 - 10) for up to 5 days., Disp: 10 tablet, Rfl: 0    triamcinolone (Kenalog) 0.1 % cream, Apply 1 Application topically 2 times a day. For nostrils, Disp: , Rfl:     Objective   Vital Signs:  Blood pressure 158/54, pulse 56, temperature 36.3 °C (97.3 °F), temperature source Temporal, resp. rate 18, height 1.6 m (5' 3\"), weight 58.8 kg (129 lb 10.1 oz), SpO2 99%.    Physical Exam  Vitals and nursing note reviewed.   Constitutional:       General: She is not in acute distress.  HENT:      Mouth/Throat:      Mouth: Mucous membranes are moist.   Eyes:      Extraocular Movements: Extraocular movements intact.      Conjunctiva/sclera: Conjunctivae normal.   Cardiovascular:      Rate and Rhythm: Normal rate and regular rhythm.      Pulses: Normal pulses.      Heart sounds: Normal heart sounds.   Pulmonary:      Effort: Pulmonary effort is normal.      Breath sounds: Normal breath sounds.   Abdominal:      General: Abdomen is flat. Bowel sounds are normal.      Palpations: Abdomen is soft.      Tenderness: There is no guarding.   Skin:     General: Skin is warm.      Capillary Refill: Capillary refill takes less than 2 seconds.      Findings: Erythema present.      Comments: LLQ circular erythema with TTP   Neurological:      Mental Status: She is alert. Mental status is at baseline.   Psychiatric:         Mood and Affect: Mood normal.         Behavior: Behavior normal.         Thought Content: Thought content normal.         Judgment: Judgment normal.     Wt " Readings from Last 6 Encounters:   07/29/24 58.8 kg (129 lb 10.1 oz)   04/22/24 59.9 kg (132 lb)   03/28/24 55.8 kg (123 lb)   03/21/24 55.8 kg (123 lb)   03/07/24 55.8 kg (123 lb)   02/29/24 56.2 kg (124 lb)       I/Os:  No intake or output data in the 24 hours ending 08/01/24 0125    Labs:   No results found for this or any previous visit (from the past 24 hour(s)).    Imaging:  No results found.    Assessment & Plan    Stella A Saturday is a 88 y.o. female on day 1 of admission to [facility] for management of LLQ Abdominal Cellulitis.    LLQ Abdominal cellulitis: Received IV Cefepime 7/29 - 7/30/24. Erythema and swelling significantly improved. I will transition her to oral antibiotic, Vantin for 7 days. Tramadol and Tylenol for pain.    Disposition: Discharge home today.    I spent a total of 60 minutes on the date of the service which included preparing to see the patient, face-to-face patient care, completing clinical documentation, obtaining and/or reviewing separately obtained history, performing a medically appropriate examination, counseling and educating the patient/family/caregiver, ordering medications, tests, or procedures, independently interpreting results (not separately reported), and communicating results to the patient/family/caregiver.     Bartolome Estrella, DO  Internal Medicine

## 2024-08-03 LAB
BACTERIA BLD CULT: NORMAL
BACTERIA BLD CULT: NORMAL

## 2024-08-07 DIAGNOSIS — E11.9 TYPE 2 DIABETES MELLITUS WITHOUT COMPLICATION, WITHOUT LONG-TERM CURRENT USE OF INSULIN (MULTI): ICD-10-CM

## 2024-08-07 DIAGNOSIS — G45.9 TIA (TRANSIENT ISCHEMIC ATTACK): ICD-10-CM

## 2024-08-07 DIAGNOSIS — I63.9 CEREBROVASCULAR ACCIDENT (CVA), UNSPECIFIED MECHANISM (MULTI): Primary | ICD-10-CM

## 2024-08-07 DIAGNOSIS — I50.9 HEART FAILURE, UNSPECIFIED HF CHRONICITY, UNSPECIFIED HEART FAILURE TYPE (MULTI): ICD-10-CM

## 2024-08-26 ENCOUNTER — APPOINTMENT (OUTPATIENT)
Dept: PRIMARY CARE | Facility: CLINIC | Age: 89
End: 2024-08-26
Payer: MEDICARE

## 2024-08-26 ENCOUNTER — LAB (OUTPATIENT)
Dept: LAB | Facility: LAB | Age: 89
End: 2024-08-26
Payer: MEDICARE

## 2024-08-26 VITALS
OXYGEN SATURATION: 94 % | HEART RATE: 68 BPM | SYSTOLIC BLOOD PRESSURE: 111 MMHG | WEIGHT: 125.6 LBS | RESPIRATION RATE: 16 BRPM | BODY MASS INDEX: 22.25 KG/M2 | DIASTOLIC BLOOD PRESSURE: 56 MMHG | HEIGHT: 63 IN

## 2024-08-26 DIAGNOSIS — G44.221 CHRONIC TENSION-TYPE HEADACHE, INTRACTABLE: ICD-10-CM

## 2024-08-26 DIAGNOSIS — E11.9 TYPE 2 DIABETES MELLITUS WITHOUT COMPLICATION, WITHOUT LONG-TERM CURRENT USE OF INSULIN (MULTI): ICD-10-CM

## 2024-08-26 DIAGNOSIS — D64.9 ANEMIA, UNSPECIFIED TYPE: ICD-10-CM

## 2024-08-26 DIAGNOSIS — M17.0 OSTEOARTHRITIS OF BOTH KNEES, UNSPECIFIED OSTEOARTHRITIS TYPE: ICD-10-CM

## 2024-08-26 DIAGNOSIS — I25.118 CORONARY ARTERY DISEASE OF NATIVE ARTERY OF NATIVE HEART WITH STABLE ANGINA PECTORIS (CMS-HCC): ICD-10-CM

## 2024-08-26 DIAGNOSIS — I20.89 STABLE ANGINA (CMS-HCC): ICD-10-CM

## 2024-08-26 DIAGNOSIS — K08.9 POOR DENTITION: ICD-10-CM

## 2024-08-26 DIAGNOSIS — N18.32 STAGE 3B CHRONIC KIDNEY DISEASE (MULTI): ICD-10-CM

## 2024-08-26 LAB
ANION GAP SERPL CALC-SCNC: 15 MMOL/L (ref 10–20)
BASOPHILS # BLD AUTO: 0.06 X10*3/UL (ref 0–0.1)
BASOPHILS NFR BLD AUTO: 0.6 %
BUN SERPL-MCNC: 34 MG/DL (ref 6–23)
CALCIUM SERPL-MCNC: 9.2 MG/DL (ref 8.6–10.3)
CHLORIDE SERPL-SCNC: 106 MMOL/L (ref 98–107)
CO2 SERPL-SCNC: 21 MMOL/L (ref 21–32)
CREAT SERPL-MCNC: 1.66 MG/DL (ref 0.5–1.05)
EGFRCR SERPLBLD CKD-EPI 2021: 30 ML/MIN/1.73M*2
EOSINOPHIL # BLD AUTO: 0.26 X10*3/UL (ref 0–0.4)
EOSINOPHIL NFR BLD AUTO: 2.7 %
ERYTHROCYTE [DISTWIDTH] IN BLOOD BY AUTOMATED COUNT: 13.7 % (ref 11.5–14.5)
GLUCOSE SERPL-MCNC: 163 MG/DL (ref 74–99)
HCT VFR BLD AUTO: 31.4 % (ref 36–46)
HGB BLD-MCNC: 10.2 G/DL (ref 12–16)
IMM GRANULOCYTES # BLD AUTO: 0.06 X10*3/UL (ref 0–0.5)
IMM GRANULOCYTES NFR BLD AUTO: 0.6 % (ref 0–0.9)
LYMPHOCYTES # BLD AUTO: 2.44 X10*3/UL (ref 0.8–3)
LYMPHOCYTES NFR BLD AUTO: 25.4 %
MCH RBC QN AUTO: 30.9 PG (ref 26–34)
MCHC RBC AUTO-ENTMCNC: 32.5 G/DL (ref 32–36)
MCV RBC AUTO: 95 FL (ref 80–100)
MONOCYTES # BLD AUTO: 0.58 X10*3/UL (ref 0.05–0.8)
MONOCYTES NFR BLD AUTO: 6 %
NEUTROPHILS # BLD AUTO: 6.2 X10*3/UL (ref 1.6–5.5)
NEUTROPHILS NFR BLD AUTO: 64.7 %
NRBC BLD-RTO: 0 /100 WBCS (ref 0–0)
PLATELET # BLD AUTO: 293 X10*3/UL (ref 150–450)
POC HEMOGLOBIN A1C: 6.2 % (ref 4.2–6.5)
POTASSIUM SERPL-SCNC: 5.1 MMOL/L (ref 3.5–5.3)
RBC # BLD AUTO: 3.3 X10*6/UL (ref 4–5.2)
SODIUM SERPL-SCNC: 137 MMOL/L (ref 136–145)
WBC # BLD AUTO: 9.6 X10*3/UL (ref 4.4–11.3)

## 2024-08-26 PROCEDURE — 36415 COLL VENOUS BLD VENIPUNCTURE: CPT

## 2024-08-26 PROCEDURE — 1111F DSCHRG MED/CURRENT MED MERGE: CPT | Performed by: INTERNAL MEDICINE

## 2024-08-26 PROCEDURE — 1160F RVW MEDS BY RX/DR IN RCRD: CPT | Performed by: INTERNAL MEDICINE

## 2024-08-26 PROCEDURE — 80048 BASIC METABOLIC PNL TOTAL CA: CPT

## 2024-08-26 PROCEDURE — 1123F ACP DISCUSS/DSCN MKR DOCD: CPT | Performed by: INTERNAL MEDICINE

## 2024-08-26 PROCEDURE — 1126F AMNT PAIN NOTED NONE PRSNT: CPT | Performed by: INTERNAL MEDICINE

## 2024-08-26 PROCEDURE — 1159F MED LIST DOCD IN RCRD: CPT | Performed by: INTERNAL MEDICINE

## 2024-08-26 PROCEDURE — 1157F ADVNC CARE PLAN IN RCRD: CPT | Performed by: INTERNAL MEDICINE

## 2024-08-26 PROCEDURE — 99214 OFFICE O/P EST MOD 30 MIN: CPT | Performed by: INTERNAL MEDICINE

## 2024-08-26 PROCEDURE — 83036 HEMOGLOBIN GLYCOSYLATED A1C: CPT | Performed by: INTERNAL MEDICINE

## 2024-08-26 PROCEDURE — 3078F DIAST BP <80 MM HG: CPT | Performed by: INTERNAL MEDICINE

## 2024-08-26 PROCEDURE — 3074F SYST BP LT 130 MM HG: CPT | Performed by: INTERNAL MEDICINE

## 2024-08-26 PROCEDURE — 1036F TOBACCO NON-USER: CPT | Performed by: INTERNAL MEDICINE

## 2024-08-26 PROCEDURE — 85025 COMPLETE CBC W/AUTO DIFF WBC: CPT

## 2024-08-26 PROCEDURE — 1158F ADVNC CARE PLAN TLK DOCD: CPT | Performed by: INTERNAL MEDICINE

## 2024-08-26 RX ORDER — POTASSIUM CHLORIDE 20 MEQ/1
20 TABLET, EXTENDED RELEASE ORAL DAILY
COMMUNITY
Start: 2024-06-12

## 2024-08-26 RX ORDER — TIZANIDINE 2 MG/1
2 TABLET ORAL NIGHTLY PRN
Qty: 30 TABLET | Refills: 1 | Status: SHIPPED | OUTPATIENT
Start: 2024-08-26

## 2024-08-26 RX ORDER — ACETAMINOPHEN 500 MG
1000 TABLET ORAL 3 TIMES DAILY
Qty: 30 TABLET | Refills: 0 | Status: SHIPPED | OUTPATIENT
Start: 2024-08-26 | End: 2024-09-05

## 2024-08-26 ASSESSMENT — ENCOUNTER SYMPTOMS
CONSTITUTIONAL NEGATIVE: 1
SHORTNESS OF BREATH: 1
CARDIOVASCULAR NEGATIVE: 1
NEUROLOGICAL NEGATIVE: 1
GASTROINTESTINAL NEGATIVE: 1
ARTHRALGIAS: 1
PSYCHIATRIC NEGATIVE: 1

## 2024-08-26 ASSESSMENT — COLUMBIA-SUICIDE SEVERITY RATING SCALE - C-SSRS
6. HAVE YOU EVER DONE ANYTHING, STARTED TO DO ANYTHING, OR PREPARED TO DO ANYTHING TO END YOUR LIFE?: NO
1. IN THE PAST MONTH, HAVE YOU WISHED YOU WERE DEAD OR WISHED YOU COULD GO TO SLEEP AND NOT WAKE UP?: NO
2. HAVE YOU ACTUALLY HAD ANY THOUGHTS OF KILLING YOURSELF?: NO

## 2024-08-26 ASSESSMENT — PAIN SCALES - GENERAL: PAINLEVEL: 0-NO PAIN

## 2024-08-26 ASSESSMENT — PATIENT HEALTH QUESTIONNAIRE - PHQ9
1. LITTLE INTEREST OR PLEASURE IN DOING THINGS: NOT AT ALL
2. FEELING DOWN, DEPRESSED OR HOPELESS: NOT AT ALL
SUM OF ALL RESPONSES TO PHQ9 QUESTIONS 1 AND 2: 0

## 2024-08-26 NOTE — PROGRESS NOTES
"Patient ID: She states that her right side of her neck has been painful. She states that her right leg has been \"getting heavy and goes dead on her occasionally\". She states that she has been doing leg exercises at home. She states that she has been having issues with her knees as well. She states that she has been having pain in the top of her head at times. She states that she has a lump on her inner left knee that sometimes is painful. She states she gets tired from sitting in chair all day. She denies any pain in her chest. She states that her stool has been black and loose for quite awhile. She states she lost her hearing aid, has a very difficult time hearing, has her nephew with her to help. She states she also has numbness in her left foot and leg occasionally. She states she wears non skid stockings to prevent from slipping. She states she uses cane and walker to help with ambulation. She states that she has not been taking Tylenol regularly at home. She has apt for preliminary with dentist end of next month, will     ANIBAL Douglas A Saturday is a 88 y.o. female with PMH remarkable for HTN, IDDM-II, hypothyroidism, CKD  who presents to the office today for follow up. She was recently hospitalized from 07/29-->07/31/24( [facility])  for abdominal wall cellulitis, treated with IV Cefepime with improvement noted, discharged to home on PO Vantin.     HEALTH MAINTENANCE: FOLLOW UP   Last Office Visit: 4/22/24  Last Labs: 7/30/24  Colonoscopy (45-75 or age 40 with 1st degree relative dx colon ca): 3/5/2012  Lung cancer screening (50-78 y/o x 20 pk yr for at least 20 yrs + current smoker OR quit in last 15 years, no CT w/I last year): na  DEXA (65+, q 2 years): 10/28/2020 osteopenia  2D echo on 2/13/24 revealed:  1. Left ventricular systolic function is normal with a 65-70% estimated ejection fraction.  2. Spectral Doppler shows an impaired relaxation pattern of left ventricular diastolic filling.  3. There is " "moderate concentric left ventricular hypertrophy. 4. Mild aortic valve stenosis. 5. There is moderate aortic valve cusp calcification.    Social History     Tobacco Use    Smoking status: Never    Smokeless tobacco: Never   Vaping Use    Vaping status: Never Used   Substance Use Topics    Alcohol use: Never    Drug use: Never     Review of Systems   Constitutional: Negative.    HENT: Negative.     Respiratory:  Positive for shortness of breath.    Cardiovascular: Negative.    Gastrointestinal: Negative.    Genitourinary: Negative.    Musculoskeletal:  Positive for arthralgias.   Neurological: Negative.    Psychiatric/Behavioral: Negative.         Visit Vitals  Vitals:    08/26/24 1526   BP: 111/56   BP Location: Right arm   Pulse: 68   Resp: 16   SpO2: 94%   Weight: 57 kg (125 lb 9.6 oz)   Height: 1.6 m (5' 3\")      OB Status Postmenopausal   Smoking Status Never     Physical Exam  Vitals reviewed.   Constitutional:       Appearance: Normal appearance.   HENT:      Head: Normocephalic and atraumatic.   Cardiovascular:      Rate and Rhythm: Normal rate and regular rhythm.      Pulses: Normal pulses.      Heart sounds: Normal heart sounds.   Pulmonary:      Effort: Pulmonary effort is normal.      Breath sounds: Normal breath sounds.   Abdominal:      General: Bowel sounds are normal.      Palpations: Abdomen is soft.   Musculoskeletal:         General: Normal range of motion.   Skin:     General: Skin is warm and dry.   Neurological:      General: No focal deficit present.      Mental Status: She is alert and oriented to person, place, and time.   Psychiatric:         Mood and Affect: Mood normal.         Behavior: Behavior normal.         Current Outpatient Medications   Medication Instructions    acetaminophen (Tylenol) 325 mg tablet oral    amLODIPine (NORVASC) 2.5 mg, oral, Daily    atorvastatin (LIPITOR) 40 mg, oral, Daily    Autolet (Accu-Chek FastClix Lancing Dev) lancing device Use as instructed    blood " "sugar diagnostic (True Metrix Glucose Test Strip) strip 1 strip, miscellaneous, 3 times daily    buPROPion XL (WELLBUTRIN XL) 150 mg, oral, Every morning, Do not crush, chew, or split.    clopidogrel (PLAVIX) 75 mg, oral, Daily    cyanocobalamin (VITAMIN B-12) 1,000 mcg, oral, Daily    docusate sodium (Colace) 100 mg capsule oral    ergocalciferol (VITAMIN D-2) 1,250 mcg, oral, Once Weekly    fenofibrate (TRICOR) 54 mg, oral, Daily    ferrous sulfate, 325 mg ferrous sulfate, (FeroSuL) tablet 1 tablet, oral, Daily, as directed    fluticasone (Flonase) 50 mcg/actuation nasal spray 2 sprays, Each Nostril, Daily, Shake gently. Before first use, prime pump. After use, clean tip and replace cap.    FreeStyle glucose monitoring (True Metrix Glucose Meter) kit Use as directed    glucose 4 gram chewable tablet 8 g AS DIRECTED (route: BY MOUTH)    insulin degludec (TRESIBA FLEXTOUCH U-100) 14 Units, subcutaneous, Every morning    isosorbide mononitrate ER (IMDUR) 30 mg, oral, Daily, Do not crush or chew.    latanoprost (Xalatan) 0.005 % ophthalmic solution 1 drop, Both Eyes, Daily    levothyroxine (SYNTHROID, LEVOXYL) 100 mcg, oral, Daily before breakfast    lidocaine (Lidoderm) 5 % patch Topical    metoprolol tartrate (LOPRESSOR) 12.5 mg, oral, Every 12 hours scheduled    omega 3-dha-epa-fish oil (Fish OiL) 1,000 mg (120 mg-180 mg) capsule oral    pen needle, diabetic 31 gauge x 5/16\" needle Use 1 time daily with insulin pen    tiZANidine (ZANAFLEX) 2 mg, oral, Daily    triamcinolone (Kenalog) 0.1 % cream 1 Application, Topical, 2 times daily, For nostrils      Lab Results   Component Value Date    WBC 9.6 07/30/2024    HGB 9.4 (L) 07/30/2024    HCT 30.6 (L) 07/30/2024     07/30/2024    CHOL 99 02/13/2024    TRIG 184 (H) 02/13/2024    HDL 22.1 02/13/2024    LDLDIRECT 95 01/17/2019    ALT 12 07/29/2024    AST 13 07/29/2024     07/30/2024    K 4.0 07/30/2024     (H) 07/30/2024    CREATININE 1.41 (H) " 07/30/2024    BUN 17 07/30/2024    CO2 21 07/30/2024    TSH 0.04 (L) 01/30/2024    INR 1.1 02/13/2024    HGBA1C 6.6 (H) 03/14/2024     Problem List Items Addressed This Visit             ICD-10-CM    Anemia D64.9    Relevant Orders    CBC and Auto Differential (Completed)    Coronary artery disease of native artery of native heart with stable angina pectoris (CMS-HCC) I25.118     Stable  Continue with Plavix and lipid lowering agent         Type 2 diabetes mellitus without complication, without long-term current use of insulin (Multi) E11.9     A1c is improved today, 6.2  Continue with Tresiba  Sliding scale and regular insulin were stopped at previous OV         Relevant Orders    POCT glycosylated hemoglobin (Hb A1C) manually resulted (Completed)    Chronic tension-type headache, intractable G44.221    Relevant Medications    tiZANidine (Zanaflex) 2 mg tablet    Stable angina (CMS-HCC) I20.89     Stable  Continue with Imdur         Poor dentition K08.9     -she has multiple decayed and broken teeth, advised patient on previous exam that she will have to wait six months from mini stroke which was in March as we will have to stop Plavix for seven days for procedure  Advised patient's nephew who is with her today to call once they are aware of procedure date and hold instructions will be given, he reports she has consultation at end of September             Other Visit Diagnoses         Codes    Osteoarthritis of both knees, unspecified osteoarthritis type     M17.0    Relevant Medications    acetaminophen (Tylenol Extra Strength) 500 mg tablet            --------------------  Written by Abida Li LPN, acting as a scribe for Dr. Jolley. This note accurately reflects the work and decisions made by Dr. Jolley.     I, Dr. Jolley, attest all medical record entries made by the scribe were under my direction and were personally dictated by me. I have reviewed the chart and agree that the record accurately reflects my  performance of the history, physical exam, and assessment and plan.

## 2024-08-26 NOTE — PATIENT INSTRUCTIONS
It was great to see you in the office today! Here is what we discussed at your visit today:  Please continue to take your current medications   As discussed, we will send in prescriptions for Tylenol 1 gram three times per day for knee pain  Call if no improvement, we can do steroid injection  We will send in prescription for Tizanidine 2mg every night for pain  Please get bloodwork drawn as soon as you are able. We will call you with results.

## 2024-08-27 NOTE — ASSESSMENT & PLAN NOTE
A1c is improved today, 6.2  Continue with Tresiba  Sliding scale and regular insulin were stopped at previous OV

## 2024-08-27 NOTE — ASSESSMENT & PLAN NOTE
-she has multiple decayed and broken teeth, advised patient on previous exam that she will have to wait six months from mini stroke which was in March as we will have to stop Plavix for seven days for procedure  Advised patient's nephew who is with her today to call once they are aware of procedure date and hold instructions will be given, he reports she has consultation at end of September

## 2024-09-10 DIAGNOSIS — F32.A DEPRESSION, UNSPECIFIED DEPRESSION TYPE: ICD-10-CM

## 2024-09-10 DIAGNOSIS — M17.0 OSTEOARTHRITIS OF BOTH KNEES, UNSPECIFIED OSTEOARTHRITIS TYPE: ICD-10-CM

## 2024-09-10 DIAGNOSIS — E55.9 VITAMIN D DEFICIENCY: ICD-10-CM

## 2024-09-10 RX ORDER — ERGOCALCIFEROL 1.25 MG/1
1 CAPSULE ORAL
Qty: 12 CAPSULE | Refills: 0 | Status: CANCELLED | OUTPATIENT
Start: 2024-09-10

## 2024-09-11 RX ORDER — BUPROPION HYDROCHLORIDE 150 MG/1
150 TABLET ORAL EVERY MORNING
Qty: 90 TABLET | Refills: 1 | Status: SHIPPED | OUTPATIENT
Start: 2024-09-11 | End: 2025-03-10

## 2024-09-11 RX ORDER — ACETAMINOPHEN 500 MG
1000 TABLET ORAL 3 TIMES DAILY
Qty: 90 TABLET | Refills: 1 | Status: SHIPPED | OUTPATIENT
Start: 2024-09-11 | End: 2025-03-10

## 2024-09-18 DIAGNOSIS — E55.9 VITAMIN D DEFICIENCY: Primary | ICD-10-CM

## 2024-09-18 DIAGNOSIS — E55.9 VITAMIN D DEFICIENCY: ICD-10-CM

## 2024-09-18 RX ORDER — ERGOCALCIFEROL 1.25 MG/1
1 CAPSULE ORAL
Qty: 12 CAPSULE | Refills: 0 | OUTPATIENT
Start: 2024-09-22

## 2024-10-04 DIAGNOSIS — I63.9 CEREBRAL INFARCTION, UNSPECIFIED: ICD-10-CM

## 2024-10-07 RX ORDER — CLOPIDOGREL BISULFATE 75 MG/1
75 TABLET ORAL DAILY
Qty: 90 TABLET | Refills: 1 | Status: SHIPPED | OUTPATIENT
Start: 2024-10-07

## 2024-10-07 NOTE — TELEPHONE ENCOUNTER
Pt care giver vasyl called in. Her dds procedure has been scheduled for 11-5, he has the following questions; how many days should the blood thinners be held prior to the procedure?, since she had the mini stroke in march while under anesthesia is this procedure safe since they are talking about placing her under anesthesia? Last visit was 8-26-24.

## 2024-10-09 DIAGNOSIS — G44.221 CHRONIC TENSION-TYPE HEADACHE, INTRACTABLE: ICD-10-CM

## 2024-10-09 RX ORDER — TIZANIDINE 2 MG/1
2 TABLET ORAL NIGHTLY PRN
Qty: 30 TABLET | Refills: 1 | Status: SHIPPED | OUTPATIENT
Start: 2024-10-09

## 2024-10-25 DIAGNOSIS — I11.9 HYPERTENSIVE HEART DISEASE WITHOUT HEART FAILURE: ICD-10-CM

## 2024-10-25 DIAGNOSIS — G45.9 TIA (TRANSIENT ISCHEMIC ATTACK): ICD-10-CM

## 2024-10-25 DIAGNOSIS — G44.221 CHRONIC TENSION-TYPE HEADACHE, INTRACTABLE: ICD-10-CM

## 2024-10-25 DIAGNOSIS — R53.83 FATIGUE, UNSPECIFIED TYPE: ICD-10-CM

## 2024-10-28 RX ORDER — LANOLIN ALCOHOL/MO/W.PET/CERES
1000 CREAM (GRAM) TOPICAL DAILY
Qty: 90 TABLET | Refills: 1 | Status: SHIPPED | OUTPATIENT
Start: 2024-10-28

## 2024-10-28 RX ORDER — ATORVASTATIN CALCIUM 40 MG/1
40 TABLET, FILM COATED ORAL DAILY
Qty: 90 TABLET | Refills: 1 | Status: SHIPPED | OUTPATIENT
Start: 2024-10-28 | End: 2025-04-26

## 2024-10-28 RX ORDER — TIZANIDINE 2 MG/1
2 TABLET ORAL NIGHTLY PRN
Qty: 30 TABLET | Refills: 1 | Status: SHIPPED | OUTPATIENT
Start: 2024-10-28

## 2024-10-28 RX ORDER — METOPROLOL TARTRATE 25 MG/1
12.5 TABLET, FILM COATED ORAL EVERY 12 HOURS SCHEDULED
Qty: 90 TABLET | Refills: 0 | Status: SHIPPED | OUTPATIENT
Start: 2024-10-28

## 2024-11-05 DIAGNOSIS — E78.2 MIXED HYPERLIPIDEMIA: ICD-10-CM

## 2024-11-05 DIAGNOSIS — E11.9 TYPE 2 DIABETES MELLITUS WITHOUT COMPLICATION, WITHOUT LONG-TERM CURRENT USE OF INSULIN (MULTI): ICD-10-CM

## 2024-11-05 DIAGNOSIS — I11.9 HYPERTENSIVE HEART DISEASE WITHOUT HEART FAILURE: ICD-10-CM

## 2024-11-05 RX ORDER — FENOFIBRATE 54 MG/1
54 TABLET ORAL DAILY
Qty: 90 TABLET | Refills: 1 | Status: SHIPPED | OUTPATIENT
Start: 2024-11-05

## 2024-11-05 RX ORDER — ISOSORBIDE MONONITRATE 30 MG/1
30 TABLET, EXTENDED RELEASE ORAL DAILY
Qty: 90 TABLET | Refills: 1 | Status: SHIPPED | OUTPATIENT
Start: 2024-11-05 | End: 2025-05-04

## 2024-11-13 DIAGNOSIS — E11.9 TYPE 2 DIABETES MELLITUS WITHOUT COMPLICATION, WITHOUT LONG-TERM CURRENT USE OF INSULIN (MULTI): ICD-10-CM

## 2024-11-14 RX ORDER — LANCETS 28 GAUGE
EACH MISCELLANEOUS
Qty: 100 EACH | Refills: 12 | Status: SHIPPED | OUTPATIENT
Start: 2024-11-14 | End: 2025-11-13

## 2024-11-14 RX ORDER — NEEDLES, SAFETY 22GX1 1/2"
1 NEEDLE, DISPOSABLE MISCELLANEOUS DAILY
Qty: 100 EACH | Refills: 11 | Status: SHIPPED | OUTPATIENT
Start: 2024-11-14 | End: 2025-11-14

## 2024-11-17 DIAGNOSIS — M17.0 OSTEOARTHRITIS OF BOTH KNEES, UNSPECIFIED OSTEOARTHRITIS TYPE: ICD-10-CM

## 2024-11-18 RX ORDER — ACETAMINOPHEN 500 MG
1000 TABLET ORAL 3 TIMES DAILY
Qty: 90 TABLET | Refills: 1 | Status: SHIPPED | OUTPATIENT
Start: 2024-11-18

## 2024-11-29 DIAGNOSIS — E87.6 HYPOKALEMIA: ICD-10-CM

## 2024-12-02 RX ORDER — POTASSIUM CHLORIDE 20 MEQ/1
20 TABLET, EXTENDED RELEASE ORAL DAILY
Qty: 90 TABLET | Refills: 1 | Status: SHIPPED | OUTPATIENT
Start: 2024-12-02

## 2024-12-04 DIAGNOSIS — G44.221 CHRONIC TENSION-TYPE HEADACHE, INTRACTABLE: ICD-10-CM

## 2024-12-04 DIAGNOSIS — I11.9 HYPERTENSIVE HEART DISEASE WITHOUT HEART FAILURE: ICD-10-CM

## 2024-12-04 RX ORDER — TIZANIDINE 2 MG/1
2 TABLET ORAL NIGHTLY PRN
Qty: 30 TABLET | Refills: 1 | Status: SHIPPED | OUTPATIENT
Start: 2024-12-04

## 2024-12-04 RX ORDER — AMLODIPINE BESYLATE 2.5 MG/1
2.5 TABLET ORAL DAILY
Qty: 90 TABLET | Refills: 1 | Status: SHIPPED | OUTPATIENT
Start: 2024-12-04

## 2024-12-12 ENCOUNTER — NURSING HOME VISIT (OUTPATIENT)
Dept: POST ACUTE CARE | Facility: EXTERNAL LOCATION | Age: 89
End: 2024-12-12
Payer: MEDICARE

## 2024-12-12 DIAGNOSIS — E55.9 VITAMIN D DEFICIENCY: ICD-10-CM

## 2024-12-12 DIAGNOSIS — I25.118 CORONARY ARTERY DISEASE OF NATIVE ARTERY OF NATIVE HEART WITH STABLE ANGINA PECTORIS: ICD-10-CM

## 2024-12-12 DIAGNOSIS — D50.8 OTHER IRON DEFICIENCY ANEMIA: ICD-10-CM

## 2024-12-12 DIAGNOSIS — I10 BENIGN ESSENTIAL HTN: Primary | ICD-10-CM

## 2024-12-12 DIAGNOSIS — N30.00 ACUTE CYSTITIS WITHOUT HEMATURIA: ICD-10-CM

## 2024-12-12 DIAGNOSIS — K52.9 COLITIS: ICD-10-CM

## 2024-12-12 DIAGNOSIS — R53.81 PHYSICAL DECONDITIONING: ICD-10-CM

## 2024-12-12 DIAGNOSIS — N18.32 STAGE 3B CHRONIC KIDNEY DISEASE (MULTI): ICD-10-CM

## 2024-12-12 DIAGNOSIS — E11.9 TYPE 2 DIABETES MELLITUS WITHOUT COMPLICATION, WITHOUT LONG-TERM CURRENT USE OF INSULIN (MULTI): ICD-10-CM

## 2024-12-12 DIAGNOSIS — E03.8 OTHER SPECIFIED HYPOTHYROIDISM: ICD-10-CM

## 2024-12-12 DIAGNOSIS — E78.49 OTHER HYPERLIPIDEMIA: ICD-10-CM

## 2024-12-12 NOTE — LETTER
Patient: Stella Saturday  : 1935    Encounter Date: 2024    Patient ID: Stella WILKES Saturday is a 89 y.o. female who is acute skilled care being seen and evaluated for multiple medical problems.  [id number]   1935    /60   Pulse 92   Temp 36.1 °C (97 °F)   Resp 16   Wt 55.8 kg (123 lb)   SpO2 96%   BMI 21.79 kg/m²     Assessment/Plan  Problem List Items Addressed This Visit       Anemia     Ferrous sulfate 325 mg by mouth daily   B12 1000 mcg by mouth daily  2024 H.H 9.7/29.6  2024 H/H 9.9/30.6         Benign essential HTN - Primary     Metoprolol tart 12.5 mg by mouth BID  Amlodipine 2.5 mg by mouth daily            Hyperlipemia     Atorvastatin 80 mg by mouth daily   Fish oil 1000 mg by mouth daily   Fenofibrate 54 mg by mouth daily          Hypothyroidism     Levothyroxine 100 mcg by mouth daily            Stage 3b chronic kidney disease (Multi)     Avoid NSAIDS  Monitor renal panel   2024 BUN/Creat 24/1.2, Potassium 4.8  2024 BUN/Creat 13/1.2, Potassium 4.1         Physical deconditioning     PT/OT         Vitamin D deficiency     D2 50,000 units by mouth every Wednesday             Coronary artery disease of native artery of native heart with stable angina pectoris     Plavix 75 mg by mouth daily  Isosorbide Mono 30 mg by mouth daily          Type 2 diabetes mellitus without complication, without long-term current use of insulin (Multi)     Lispro SSC as prescribed          Acute cystitis without hematuria     Macrobid 100 mg by mouth BID (Stop date 2024)          Colitis     Flagyl 500 mg by mouth TID (x 10 doses- hospital DC order)   2024 BMP/CBC with diff               HPI: Client was admitted at [facility] from 2024- 2024 with the final diagnosis of Colitis, UTI, CHASE on CKD, and left sided abdominal pain. CT AP showed Rectal Impaction and Stercoral Colitis. CDIFF (-). She received IVF, Rocephin, and Flagyl. ASA, Degludec insulin, and  Tizanidine discontinued. PMH includes IBS, Meniere, DM2, HLD, HTN, and Hypothyroidism. Client [place]. Client denies HA, current dizziness, or lightheadedness. Denies CP, SOB, Cough, or increased edema. RA. Denies current abdominal pain, N/V, diarrhea, or constipation. She C/O intermittent abdominal cramping. Denies dysuria. States appetite has been decreased. She has no current C/O pain.     Review of Systems ROS as described in HPI     Physical Exam  Constitutional:       Comments: Resting in bed    HENT:      Mouth/Throat:      Mouth: Mucous membranes are moist.   Cardiovascular:      Rate and Rhythm: Normal rate.   Pulmonary:      Effort: Pulmonary effort is normal.      Breath sounds: Normal breath sounds.      Comments: RA  Abdominal:      General: Bowel sounds are normal.   Genitourinary:     Comments: Denies dysuria   Musculoskeletal:      Comments: PT/OT   No leg edema    Skin:     General: Skin is warm and dry.   Neurological:      Mental Status: She is alert. Mental status is at baseline.   Psychiatric:         Mood and Affect: Mood normal.      Comments: Conversational                    Electronically Signed By: ANUSHKA Angelo   12/14/24  9:32 AM

## 2024-12-14 VITALS
BODY MASS INDEX: 21.79 KG/M2 | WEIGHT: 123 LBS | DIASTOLIC BLOOD PRESSURE: 60 MMHG | SYSTOLIC BLOOD PRESSURE: 129 MMHG | OXYGEN SATURATION: 96 % | RESPIRATION RATE: 16 BRPM | HEART RATE: 92 BPM | TEMPERATURE: 97 F

## 2024-12-14 PROBLEM — K59.09 OTHER CONSTIPATION: Status: ACTIVE | Noted: 2024-12-14

## 2024-12-14 PROBLEM — K52.9 COLITIS: Status: ACTIVE | Noted: 2024-12-14

## 2024-12-14 PROBLEM — A56.8: Status: ACTIVE | Noted: 2024-12-14

## 2024-12-14 PROBLEM — N30.00 ACUTE CYSTITIS WITHOUT HEMATURIA: Status: ACTIVE | Noted: 2024-12-14

## 2024-12-14 PROBLEM — A04.8: Status: ACTIVE | Noted: 2024-12-14

## 2024-12-14 NOTE — ASSESSMENT & PLAN NOTE
Avoid NSAIDS  Monitor renal panel   2/26/2024 BUN/Creat 24/1.2, Potassium 4.8  12/11/2024 BUN/Creat 13/1.2, Potassium 4.1

## 2024-12-14 NOTE — ASSESSMENT & PLAN NOTE
Ferrous sulfate 325 mg by mouth daily   B12 1000 mcg by mouth daily  2/26/2024 H.H 9.7/29.6  12/11/2024 H/H 9.9/30.6

## 2024-12-14 NOTE — PROGRESS NOTES
Patient ID: Stella WILKES Saturday is a 89 y.o. female who is acute [facility] care being seen and evaluated for multiple medical problems.  [id number]   1935    /60   Pulse 92   Temp 36.1 °C (97 °F)   Resp 16   Wt 55.8 kg (123 lb)   SpO2 96%   BMI 21.79 kg/m²     Assessment/Plan  Problem List Items Addressed This Visit       Anemia     Ferrous sulfate 325 mg by mouth daily   B12 1000 mcg by mouth daily  2/26/2024 H.H 9.7/29.6  12/11/2024 H/H 9.9/30.6         Benign essential HTN - Primary     Metoprolol tart 12.5 mg by mouth BID  Amlodipine 2.5 mg by mouth daily            Hyperlipemia     Atorvastatin 80 mg by mouth daily   Fish oil 1000 mg by mouth daily   Fenofibrate 54 mg by mouth daily          Hypothyroidism     Levothyroxine 100 mcg by mouth daily            Stage 3b chronic kidney disease (Multi)     Avoid NSAIDS  Monitor renal panel   2/26/2024 BUN/Creat 24/1.2, Potassium 4.8  12/11/2024 BUN/Creat 13/1.2, Potassium 4.1         Physical deconditioning     PT/OT         Vitamin D deficiency     D2 50,000 units by mouth every Wednesday             Coronary artery disease of native artery of native heart with stable angina pectoris     Plavix 75 mg by mouth daily  Isosorbide Mono 30 mg by mouth daily          Type 2 diabetes mellitus without complication, without long-term current use of insulin (Multi)     Lispro SSC as prescribed          Acute cystitis without hematuria     Macrobid 100 mg by mouth BID (Stop date 12/16/2024)          Colitis     Flagyl 500 mg by mouth TID (x 10 doses- hospital DC order)   12/16/2024 BMP/CBC with diff               HPI: Client was admitted at [facility] from 12/6/2024- 12/11/2024 with the final diagnosis of Colitis, UTI, CHASE on CKD, and left sided abdominal pain. CT AP showed Rectal Impaction and Stercoral Colitis. CDIFF (-). She received IVF, Rocephin, and Flagyl. ASA, Degludec insulin, and Tizanidine discontinued. PMH includes IBS, Meniere, DM2, HLD, HTN, and  Hypothyroidism. Client [place]. Client denies HA, current dizziness, or lightheadedness. Denies CP, SOB, Cough, or increased edema. RA. Denies current abdominal pain, N/V, diarrhea, or constipation. She C/O intermittent abdominal cramping. Denies dysuria. States appetite has been decreased. She has no current C/O pain.     Review of Systems ROS as described in HPI     Physical Exam  Constitutional:       Comments: Resting in bed    HENT:      Mouth/Throat:      Mouth: Mucous membranes are moist.   Cardiovascular:      Rate and Rhythm: Normal rate.   Pulmonary:      Effort: Pulmonary effort is normal.      Breath sounds: Normal breath sounds.      Comments: RA  Abdominal:      General: Bowel sounds are normal.   Genitourinary:     Comments: Denies dysuria   Musculoskeletal:      Comments: PT/OT   No leg edema    Skin:     General: Skin is warm and dry.   Neurological:      Mental Status: She is alert. Mental status is at baseline.   Psychiatric:         Mood and Affect: Mood normal.      Comments: Conversational

## 2024-12-19 ENCOUNTER — NURSING HOME VISIT (OUTPATIENT)
Dept: POST ACUTE CARE | Facility: EXTERNAL LOCATION | Age: 89
End: 2024-12-19
Payer: MEDICARE

## 2024-12-19 DIAGNOSIS — I10 BENIGN ESSENTIAL HTN: Primary | ICD-10-CM

## 2024-12-19 DIAGNOSIS — E03.9 HYPOTHYROIDISM, UNSPECIFIED TYPE: ICD-10-CM

## 2024-12-19 DIAGNOSIS — R53.81 PHYSICAL DECONDITIONING: ICD-10-CM

## 2024-12-19 DIAGNOSIS — I25.118 CORONARY ARTERY DISEASE OF NATIVE ARTERY OF NATIVE HEART WITH STABLE ANGINA PECTORIS: ICD-10-CM

## 2024-12-19 DIAGNOSIS — E78.2 MIXED HYPERLIPIDEMIA: ICD-10-CM

## 2024-12-19 DIAGNOSIS — E11.9 TYPE 2 DIABETES MELLITUS WITHOUT COMPLICATION, WITHOUT LONG-TERM CURRENT USE OF INSULIN (MULTI): ICD-10-CM

## 2024-12-19 PROCEDURE — 99308 SBSQ NF CARE LOW MDM 20: CPT | Performed by: NURSE PRACTITIONER

## 2024-12-19 NOTE — LETTER
Patient: Stella Saturday  : 1935    Encounter Date: 2024    Patient ID: Stella WILKES Saturday is a 89 y.o. female who is skilled being seen and evaluated for multiple medical problems.  [id number]   1935    /74   Pulse 70   Temp 36.7 °C (98 °F)   Resp 16   Wt 57.8 kg (127 lb 6.4 oz)   SpO2 98%   BMI 22.57 kg/m²     Assessment/Plan  Problem List Items Addressed This Visit       Benign essential HTN - Primary     Metoprolol tart 12.5 mg by mouth BID  Amlodipine 2.5 mg by mouth daily             Hyperlipemia     Atorvastatin 80 mg by mouth daily   Fish oil 1000 mg by mouth daily   Fenofibrate 54 mg by mouth daily   2024 V 97, T 245, HDL 20, LDL 28         Hypothyroidism     Levothyroxine 100 mcg by mouth daily    2024 TSH 4.78         Physical deconditioning     PT/OT         Coronary artery disease of native artery of native heart with stable angina pectoris     Plavix 75 mg by mouth daily  Isosorbide Mono 30 mg by mouth daily   2024 BUN/Creat 19/0.9, Potassium 3.9, H/H 10.5/32.8         Type 2 diabetes mellitus without complication, without long-term current use of insulin (Multi)     Lispro SSC as prescribed   2024 HGBA1C 5.6              HPI: Client continues to receive PT services. Macrobid and Flagyl completed (UTI/Colitis). Client denies HA, dizziness, or lightheadedness. Denies CP, SOB, or increased edema. RA. Denies current abdominal pain, N/V, diarrhea, or constipation. Denies dysuria. She states appetite is fair. She has no current C/O pain. She C/O feeling tired.     Review of Systems ROS as described in HPI     Physical Exam  Constitutional:       Comments: Sitting in WC   HENT:      Mouth/Throat:      Mouth: Mucous membranes are moist.   Cardiovascular:      Rate and Rhythm: Normal rate.   Pulmonary:      Effort: Pulmonary effort is normal.      Breath sounds: Normal breath sounds.      Comments: RA  Abdominal:      General: Bowel sounds are normal.    Genitourinary:     Comments: Denies dysuria   Musculoskeletal:      Comments: PT  WC transfers    Skin:     General: Skin is warm and dry.   Neurological:      Mental Status: She is alert. Mental status is at baseline.   Psychiatric:         Mood and Affect: Mood normal.                   Electronically Signed By: ANUSHKA Angelo   12/21/24  7:09 AM

## 2024-12-21 VITALS
OXYGEN SATURATION: 98 % | DIASTOLIC BLOOD PRESSURE: 74 MMHG | SYSTOLIC BLOOD PRESSURE: 160 MMHG | TEMPERATURE: 98 F | WEIGHT: 127.4 LBS | BODY MASS INDEX: 22.57 KG/M2 | HEART RATE: 70 BPM | RESPIRATION RATE: 16 BRPM

## 2024-12-21 NOTE — PROGRESS NOTES
Patient ID: Stella WILKES Saturday is a 89 y.o. female who is skilled being seen and evaluated for multiple medical problems.  [id number]   1935    /74   Pulse 70   Temp 36.7 °C (98 °F)   Resp 16   Wt 57.8 kg (127 lb 6.4 oz)   SpO2 98%   BMI 22.57 kg/m²     Assessment/Plan  Problem List Items Addressed This Visit       Benign essential HTN - Primary     Metoprolol tart 12.5 mg by mouth BID  Amlodipine 2.5 mg by mouth daily             Hyperlipemia     Atorvastatin 80 mg by mouth daily   Fish oil 1000 mg by mouth daily   Fenofibrate 54 mg by mouth daily   12/16/2024 V 97, T 245, HDL 20, LDL 28         Hypothyroidism     Levothyroxine 100 mcg by mouth daily    12/16/2024 TSH 4.78         Physical deconditioning     PT/OT         Coronary artery disease of native artery of native heart with stable angina pectoris     Plavix 75 mg by mouth daily  Isosorbide Mono 30 mg by mouth daily   12/16/2024 BUN/Creat 19/0.9, Potassium 3.9, H/H 10.5/32.8         Type 2 diabetes mellitus without complication, without long-term current use of insulin (Multi)     Lispro SSC as prescribed   12/16/2024 HGBA1C 5.6              HPI: Client continues to receive PT services. Macrobid and Flagyl completed (UTI/Colitis). Client denies HA, dizziness, or lightheadedness. Denies CP, SOB, or increased edema. RA. Denies current abdominal pain, N/V, diarrhea, or constipation. Denies dysuria. She states appetite is fair. She has no current C/O pain. She C/O feeling tired.     Review of Systems ROS as described in HPI     Physical Exam  Constitutional:       Comments: Sitting in WC   HENT:      Mouth/Throat:      Mouth: Mucous membranes are moist.   Cardiovascular:      Rate and Rhythm: Normal rate.   Pulmonary:      Effort: Pulmonary effort is normal.      Breath sounds: Normal breath sounds.      Comments: RA  Abdominal:      General: Bowel sounds are normal.   Genitourinary:     Comments: Denies dysuria   Musculoskeletal:      Comments:  PT  WC transfers    Skin:     General: Skin is warm and dry.   Neurological:      Mental Status: She is alert. Mental status is at baseline.   Psychiatric:         Mood and Affect: Mood normal.

## 2024-12-21 NOTE — ASSESSMENT & PLAN NOTE
Plavix 75 mg by mouth daily  Isosorbide Mono 30 mg by mouth daily   12/16/2024 BUN/Creat 19/0.9, Potassium 3.9, H/H 10.5/32.8

## 2024-12-21 NOTE — ASSESSMENT & PLAN NOTE
Atorvastatin 80 mg by mouth daily   Fish oil 1000 mg by mouth daily   Fenofibrate 54 mg by mouth daily   12/16/2024 V 97, T 245, HDL 20, LDL 28

## 2025-01-15 ENCOUNTER — APPOINTMENT (OUTPATIENT)
Dept: PODIATRY | Facility: CLINIC | Age: 89
End: 2025-01-15
Payer: MEDICARE